# Patient Record
Sex: FEMALE | Race: WHITE | NOT HISPANIC OR LATINO | Employment: OTHER | URBAN - METROPOLITAN AREA
[De-identification: names, ages, dates, MRNs, and addresses within clinical notes are randomized per-mention and may not be internally consistent; named-entity substitution may affect disease eponyms.]

---

## 2018-06-20 LAB
ALBUMIN SERPL BCP-MCNC: 3.8 G/DL (ref 3.5–5.7)
ALP SERPL-CCNC: 70 IU/L (ref 55–165)
ALT SERPL W P-5'-P-CCNC: 8 IU/L (ref 5–26)
ANION GAP SERPL CALCULATED.3IONS-SCNC: 12.9 MM/L
AST SERPL W P-5'-P-CCNC: 14 U/L (ref 7–26)
BACTERIA UR QL AUTO: ABNORMAL
BASOPHILS # BLD AUTO: 0.1 X3/UL (ref 0–0.3)
BASOPHILS # BLD AUTO: 1 % (ref 0–2)
BILIRUB SERPL-MCNC: 0.8 MG/DL (ref 0.3–1)
BILIRUB UR QL STRIP: NEGATIVE
BUN SERPL-MCNC: 15 MG/DL (ref 7–25)
CALCIUM SERPL-MCNC: 9.6 MG/DL (ref 8.6–10.5)
CHLORIDE SERPL-SCNC: 97 MM/L (ref 98–107)
CLARITY UR: CLEAR
CO2 SERPL-SCNC: 30 MM/L (ref 21–31)
COLOR UR: YELLOW
CREAT SERPL-MCNC: 0.81 MG/DL (ref 0.6–1.2)
DEPRECATED RDW RBC AUTO: 14.4 % (ref 11.5–14.5)
EGFR (HISTORICAL): > 60 GFR
EGFR AFRICAN AMERICAN (HISTORICAL): > 60 GFR
EOSINOPHIL # BLD AUTO: 0.1 X3/UL (ref 0–0.5)
EOSINOPHIL NFR BLD AUTO: 1.2 % (ref 0–5)
GLUCOSE (HISTORICAL): 85 MG/DL (ref 65–99)
GLUCOSE UR STRIP-MCNC: NEGATIVE MG/DL
HCT VFR BLD AUTO: 33.4 % (ref 37–47)
HGB BLD-MCNC: 11.5 G/DL (ref 12–16)
HGB UR QL STRIP.AUTO: NEGATIVE
KETONES UR STRIP-MCNC: NEGATIVE MG/DL
LEUKOCYTE ESTERASE UR QL STRIP: ABNORMAL
LYMPHOCYTES # BLD AUTO: 1.5 X3/UL (ref 1.2–4.2)
LYMPHOCYTES NFR BLD AUTO: 28 % (ref 20.5–51.1)
MCH RBC QN AUTO: 31.3 PG (ref 26–34)
MCHC RBC AUTO-ENTMCNC: 34.4 G/DL (ref 31–36)
MCV RBC AUTO: 91 FL (ref 81–99)
MONOCYTES # BLD AUTO: 0.5 X3/UL (ref 0–1)
MONOCYTES NFR BLD AUTO: 9.9 % (ref 1.7–12)
MUCUS THREADS (HISTORICAL): ABNORMAL /HPF
NEUTROPHILS # BLD AUTO: 3.2 X3/UL (ref 1.4–6.5)
NEUTS SEG NFR BLD AUTO: 59.9 % (ref 42.2–75.2)
NITRITE UR QL STRIP: NEGATIVE
NON-SQ EPI CELLS URNS QL MICRO: ABNORMAL /HPF
OSMOLALITY, SERUM (HISTORICAL): 272 MOSM (ref 262–291)
PH UR STRIP.AUTO: 7 [PH] (ref 4.5–8)
PLATELET # BLD AUTO: 326 X3/UL (ref 130–400)
PMV BLD AUTO: 7.7 FL (ref 8.6–11.7)
POTASSIUM SERPL-SCNC: 3.9 MM/L (ref 3.5–5.5)
PROT UR STRIP-MCNC: NEGATIVE MG/DL
RBC # BLD AUTO: 3.67 X6/UL (ref 3.9–5.2)
RBC #/AREA URNS AUTO: ABNORMAL /HPF
SODIUM SERPL-SCNC: 136 MM/L (ref 134–143)
SP GR UR STRIP.AUTO: 1.01 (ref 1–1.03)
T4 TOTAL (HISTORICAL): 8.94 UG/DL (ref 6.1–12.2)
TOTAL PROTEIN (HISTORICAL): 6.5 G/DL (ref 6.4–8.9)
TSH SERPL DL<=0.05 MIU/L-ACNC: 2.88 UIU/M (ref 0.45–5.33)
UROBILINOGEN UR QL STRIP.AUTO: 0.2 EU/DL (ref 0.2–8)
WBC # BLD AUTO: 5.4 X3/UL (ref 4.8–10.8)
WBC #/AREA URNS AUTO: ABNORMAL /HPF

## 2018-06-30 ENCOUNTER — HOSPITAL ENCOUNTER (INPATIENT)
Facility: HOSPITAL | Age: 83
LOS: 2 days | Discharge: HOME HEALTH | DRG: 069 | End: 2018-07-03
Attending: EMERGENCY MEDICINE | Admitting: GENERAL PRACTICE
Payer: MEDICARE

## 2018-06-30 ENCOUNTER — APPOINTMENT (EMERGENCY)
Dept: CT IMAGING | Facility: HOSPITAL | Age: 83
DRG: 069 | End: 2018-06-30
Payer: MEDICARE

## 2018-06-30 ENCOUNTER — APPOINTMENT (OUTPATIENT)
Dept: MRI IMAGING | Facility: HOSPITAL | Age: 83
DRG: 069 | End: 2018-06-30
Payer: MEDICARE

## 2018-06-30 DIAGNOSIS — G45.9 TIA (TRANSIENT ISCHEMIC ATTACK): Primary | ICD-10-CM

## 2018-06-30 DIAGNOSIS — R41.0 TRANSIENT CONFUSION: ICD-10-CM

## 2018-06-30 DIAGNOSIS — R47.81 SLURRED SPEECH: ICD-10-CM

## 2018-06-30 PROBLEM — F03.90 DEMENTIA WITHOUT BEHAVIORAL DISTURBANCE (HCC): Status: ACTIVE | Noted: 2018-06-30

## 2018-06-30 PROBLEM — K11.8 MASS OF PAROTID GLAND: Status: ACTIVE | Noted: 2018-06-30

## 2018-06-30 LAB
ANION GAP SERPL CALCULATED.3IONS-SCNC: 6 MMOL/L (ref 4–13)
APTT PPP: 34 SECONDS (ref 24–36)
BACTERIA UR QL AUTO: ABNORMAL /HPF
BILIRUB UR QL STRIP: NEGATIVE
BUN SERPL-MCNC: 14 MG/DL (ref 5–25)
CALCIUM SERPL-MCNC: 9.5 MG/DL (ref 8.3–10.1)
CHLORIDE SERPL-SCNC: 96 MMOL/L (ref 100–108)
CLARITY UR: CLEAR
CO2 SERPL-SCNC: 29 MMOL/L (ref 21–32)
COLOR UR: YELLOW
CREAT SERPL-MCNC: 0.81 MG/DL (ref 0.6–1.3)
ERYTHROCYTE [DISTWIDTH] IN BLOOD BY AUTOMATED COUNT: 14.5 % (ref 11.6–15.1)
GFR SERPL CREATININE-BSD FRML MDRD: 62 ML/MIN/1.73SQ M
GLUCOSE SERPL-MCNC: 124 MG/DL (ref 65–140)
GLUCOSE UR STRIP-MCNC: NEGATIVE MG/DL
HCT VFR BLD AUTO: 34.1 % (ref 34.8–46.1)
HGB BLD-MCNC: 11.7 G/DL (ref 11.5–15.4)
HGB UR QL STRIP.AUTO: NEGATIVE
INR PPP: 1.05 (ref 0.86–1.17)
KETONES UR STRIP-MCNC: NEGATIVE MG/DL
LEUKOCYTE ESTERASE UR QL STRIP: ABNORMAL
MAGNESIUM SERPL-MCNC: 2.2 MG/DL (ref 1.6–2.6)
MCH RBC QN AUTO: 31 PG (ref 26.8–34.3)
MCHC RBC AUTO-ENTMCNC: 34.3 G/DL (ref 31.4–37.4)
MCV RBC AUTO: 91 FL (ref 82–98)
NITRITE UR QL STRIP: NEGATIVE
NON-SQ EPI CELLS URNS QL MICRO: ABNORMAL /HPF
PH UR STRIP.AUTO: 7 [PH] (ref 4.5–8)
PLATELET # BLD AUTO: 304 THOUSANDS/UL (ref 149–390)
PMV BLD AUTO: 9.6 FL (ref 8.9–12.7)
POTASSIUM SERPL-SCNC: 3.7 MMOL/L (ref 3.5–5.3)
PROT UR STRIP-MCNC: NEGATIVE MG/DL
PROTHROMBIN TIME: 13.6 SECONDS (ref 11.8–14.2)
RBC # BLD AUTO: 3.77 MILLION/UL (ref 3.81–5.12)
RBC #/AREA URNS AUTO: ABNORMAL /HPF
SODIUM SERPL-SCNC: 131 MMOL/L (ref 136–145)
SP GR UR STRIP.AUTO: 1.01 (ref 1–1.03)
TROPONIN I SERPL-MCNC: 0.02 NG/ML
TSH SERPL DL<=0.05 MIU/L-ACNC: 3.45 UIU/ML (ref 0.36–3.74)
UROBILINOGEN UR QL STRIP.AUTO: 0.2 E.U./DL
WBC # BLD AUTO: 6.37 THOUSAND/UL (ref 4.31–10.16)
WBC #/AREA URNS AUTO: ABNORMAL /HPF

## 2018-06-30 PROCEDURE — 85610 PROTHROMBIN TIME: CPT | Performed by: EMERGENCY MEDICINE

## 2018-06-30 PROCEDURE — 83735 ASSAY OF MAGNESIUM: CPT | Performed by: EMERGENCY MEDICINE

## 2018-06-30 PROCEDURE — 85730 THROMBOPLASTIN TIME PARTIAL: CPT | Performed by: EMERGENCY MEDICINE

## 2018-06-30 PROCEDURE — 84443 ASSAY THYROID STIM HORMONE: CPT | Performed by: PHYSICIAN ASSISTANT

## 2018-06-30 PROCEDURE — 82607 VITAMIN B-12: CPT | Performed by: PHYSICIAN ASSISTANT

## 2018-06-30 PROCEDURE — 85027 COMPLETE CBC AUTOMATED: CPT | Performed by: EMERGENCY MEDICINE

## 2018-06-30 PROCEDURE — 70496 CT ANGIOGRAPHY HEAD: CPT

## 2018-06-30 PROCEDURE — 36415 COLL VENOUS BLD VENIPUNCTURE: CPT | Performed by: EMERGENCY MEDICINE

## 2018-06-30 PROCEDURE — 82746 ASSAY OF FOLIC ACID SERUM: CPT | Performed by: PHYSICIAN ASSISTANT

## 2018-06-30 PROCEDURE — 99285 EMERGENCY DEPT VISIT HI MDM: CPT

## 2018-06-30 PROCEDURE — 70551 MRI BRAIN STEM W/O DYE: CPT

## 2018-06-30 PROCEDURE — 99220 PR INITIAL OBSERVATION CARE/DAY 70 MINUTES: CPT | Performed by: GENERAL PRACTICE

## 2018-06-30 PROCEDURE — 80048 BASIC METABOLIC PNL TOTAL CA: CPT | Performed by: EMERGENCY MEDICINE

## 2018-06-30 PROCEDURE — 84484 ASSAY OF TROPONIN QUANT: CPT | Performed by: EMERGENCY MEDICINE

## 2018-06-30 PROCEDURE — 81001 URINALYSIS AUTO W/SCOPE: CPT | Performed by: EMERGENCY MEDICINE

## 2018-06-30 PROCEDURE — 70498 CT ANGIOGRAPHY NECK: CPT

## 2018-06-30 PROCEDURE — 93005 ELECTROCARDIOGRAM TRACING: CPT

## 2018-06-30 PROCEDURE — 99223 1ST HOSP IP/OBS HIGH 75: CPT | Performed by: PSYCHIATRY & NEUROLOGY

## 2018-06-30 RX ORDER — LORATADINE 10 MG/1
10 TABLET ORAL DAILY
Status: CANCELLED | OUTPATIENT
Start: 2018-07-01

## 2018-06-30 RX ORDER — FUROSEMIDE 40 MG/1
40 TABLET ORAL DAILY
Status: DISCONTINUED | OUTPATIENT
Start: 2018-07-01 | End: 2018-07-03 | Stop reason: HOSPADM

## 2018-06-30 RX ORDER — HEPARIN SODIUM 5000 [USP'U]/ML
5000 INJECTION, SOLUTION INTRAVENOUS; SUBCUTANEOUS EVERY 8 HOURS SCHEDULED
Status: DISCONTINUED | OUTPATIENT
Start: 2018-06-30 | End: 2018-07-03 | Stop reason: HOSPADM

## 2018-06-30 RX ORDER — CLOPIDOGREL BISULFATE 75 MG/1
75 TABLET ORAL DAILY
Status: CANCELLED | OUTPATIENT
Start: 2018-06-30

## 2018-06-30 RX ORDER — LORATADINE 10 MG/1
10 TABLET ORAL DAILY
Status: DISCONTINUED | OUTPATIENT
Start: 2018-07-01 | End: 2018-07-03 | Stop reason: HOSPADM

## 2018-06-30 RX ORDER — MULTIVIT WITH MINERALS/LUTEIN
1000 TABLET ORAL DAILY
COMMUNITY

## 2018-06-30 RX ORDER — FUROSEMIDE 40 MG/1
40 TABLET ORAL DAILY
Status: CANCELLED | OUTPATIENT
Start: 2018-07-01

## 2018-06-30 RX ORDER — CHLORAL HYDRATE 500 MG
1000 CAPSULE ORAL DAILY
Status: CANCELLED | OUTPATIENT
Start: 2018-06-30

## 2018-06-30 RX ORDER — PANTOPRAZOLE SODIUM 40 MG/1
40 TABLET, DELAYED RELEASE ORAL
Status: CANCELLED | OUTPATIENT
Start: 2018-07-01

## 2018-06-30 RX ORDER — ASPIRIN 81 MG/1
81 TABLET, CHEWABLE ORAL DAILY
Status: CANCELLED | OUTPATIENT
Start: 2018-06-30

## 2018-06-30 RX ORDER — PHENOL 1.4 %
600 AEROSOL, SPRAY (ML) MUCOUS MEMBRANE DAILY
COMMUNITY

## 2018-06-30 RX ORDER — CALCIUM CARBONATE 200(500)MG
1000 TABLET,CHEWABLE ORAL DAILY PRN
Status: DISCONTINUED | OUTPATIENT
Start: 2018-06-30 | End: 2018-07-03 | Stop reason: HOSPADM

## 2018-06-30 RX ORDER — AMOXICILLIN 500 MG
CAPSULE ORAL DAILY
COMMUNITY

## 2018-06-30 RX ORDER — CLOPIDOGREL BISULFATE 75 MG/1
75 TABLET ORAL
Status: DISCONTINUED | OUTPATIENT
Start: 2018-06-30 | End: 2018-07-03 | Stop reason: HOSPADM

## 2018-06-30 RX ORDER — PANTOPRAZOLE SODIUM 40 MG/1
40 TABLET, DELAYED RELEASE ORAL DAILY
COMMUNITY

## 2018-06-30 RX ORDER — ONDANSETRON 2 MG/ML
4 INJECTION INTRAMUSCULAR; INTRAVENOUS EVERY 6 HOURS PRN
Status: DISCONTINUED | OUTPATIENT
Start: 2018-06-30 | End: 2018-07-03 | Stop reason: HOSPADM

## 2018-06-30 RX ORDER — CHLORAL HYDRATE 500 MG
1000 CAPSULE ORAL DAILY
Status: DISCONTINUED | OUTPATIENT
Start: 2018-07-01 | End: 2018-07-03 | Stop reason: HOSPADM

## 2018-06-30 RX ORDER — FAMOTIDINE 20 MG
TABLET ORAL DAILY
COMMUNITY

## 2018-06-30 RX ORDER — ATORVASTATIN CALCIUM 40 MG/1
40 TABLET, FILM COATED ORAL EVERY EVENING
Status: DISCONTINUED | OUTPATIENT
Start: 2018-06-30 | End: 2018-07-03 | Stop reason: HOSPADM

## 2018-06-30 RX ORDER — FUROSEMIDE 40 MG/1
40 TABLET ORAL DAILY
COMMUNITY

## 2018-06-30 RX ORDER — POLYETHYLENE GLYCOL 3350 17 G/17G
17 POWDER, FOR SOLUTION ORAL DAILY PRN
Status: DISCONTINUED | OUTPATIENT
Start: 2018-06-30 | End: 2018-07-03 | Stop reason: HOSPADM

## 2018-06-30 RX ORDER — PANTOPRAZOLE SODIUM 40 MG/1
40 TABLET, DELAYED RELEASE ORAL
Status: DISCONTINUED | OUTPATIENT
Start: 2018-07-01 | End: 2018-07-03 | Stop reason: HOSPADM

## 2018-06-30 RX ORDER — OMEGA-3-ACID ETHYL ESTERS 1 G/1
1 CAPSULE, LIQUID FILLED ORAL DAILY
Status: ON HOLD | COMMUNITY
End: 2018-09-18

## 2018-06-30 RX ORDER — LORATADINE 10 MG/1
10 TABLET ORAL DAILY
Status: ON HOLD | COMMUNITY
End: 2018-09-18

## 2018-06-30 RX ORDER — CLOPIDOGREL BISULFATE 75 MG/1
75 TABLET ORAL
COMMUNITY
End: 2018-11-17 | Stop reason: HOSPADM

## 2018-06-30 RX ORDER — ACETAMINOPHEN 325 MG/1
650 TABLET ORAL EVERY 6 HOURS PRN
Status: DISCONTINUED | OUTPATIENT
Start: 2018-06-30 | End: 2018-07-03 | Stop reason: HOSPADM

## 2018-06-30 RX ORDER — PNV NO.95/FERROUS FUM/FOLIC AC 28MG-0.8MG
TABLET ORAL DAILY
COMMUNITY

## 2018-06-30 RX ADMIN — CLOPIDOGREL BISULFATE 75 MG: 75 TABLET ORAL at 21:45

## 2018-06-30 RX ADMIN — IOHEXOL 100 ML: 350 INJECTION, SOLUTION INTRAVENOUS at 11:37

## 2018-06-30 RX ADMIN — ATORVASTATIN CALCIUM 40 MG: 40 TABLET, FILM COATED ORAL at 17:06

## 2018-06-30 RX ADMIN — HEPARIN SODIUM 5000 UNITS: 5000 INJECTION, SOLUTION INTRAVENOUS; SUBCUTANEOUS at 14:30

## 2018-06-30 RX ADMIN — HEPARIN SODIUM 5000 UNITS: 5000 INJECTION, SOLUTION INTRAVENOUS; SUBCUTANEOUS at 21:45

## 2018-06-30 NOTE — PLAN OF CARE
Potential for Aspiration     Ventilated patient's risk of aspiration is minimized Completed          Activity Intolerance/Impaired Mobility     Mobility/activity is maintained at optimum level for patient Progressing        Communication Impairment     Ability to express needs and understand communication [place]     Discharge to home or other facility with appropriate resources Progressing        DISCHARGE PLANNING - CARE MANAGEMENT     Discharge to post-acute care or home with appropriate resources Progressing        INFECTION - ADULT     Absence or prevention of progression during hospitalization Progressing        Knowledge Deficit     Patient/family/caregiver demonstrates understanding of disease process, treatment plan, medications, and discharge instructions Progressing        Neurological Deficit     Neurological status is stable or improving Progressing        Nutrition     Nutrition/Hydration status is improving Progressing        PAIN - ADULT     Verbalizes/displays adequate comfort level or baseline comfort level Progressing        Potential for Aspiration     Non-ventilated patient's risk of aspiration is minimized Progressing        SAFETY ADULT     Patient will remain free of falls Progressing     Maintain or return to baseline ADL function Progressing     Maintain or return mobility status to optimal level Progressing

## 2018-06-30 NOTE — ASSESSMENT & PLAN NOTE
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

## 2018-06-30 NOTE — ASSESSMENT & PLAN NOTE
· Suspected, will check TSH, B12, folate  · Dementia precautions, reoriented as needed, fall precautions

## 2018-06-30 NOTE — CONSULTS
Consultation - Neurology   Kimmy Christy 80 y o  female MRN: [id number]  Unit/Bed#: -01 Encounter: [id number]      Physician Requesting Consult: Kenna Zamorano MD  Reason for Consult:  Speech dysfunction, acute confusion  Hx and PE limited by:  None    HPI: Kimmy Christy is a right handed  80 y o  female who is extremely hard of hearing, and was in her usual state of health when she came out this morning into her kitchen told her son she did feel right and subsequently while having breakfast had difficulty doing so was noted to be gibberish had slurred speech, and appeared confused  She apparently appeared extremely pale, her son took her blood pressure which was 130 /70 and decided to bring her into the emergency room     By the time she got to the emergency room her speech had improved, patient's confusion also cleared and was not witnessed to have loss of consciousness or any seizure-like activity  Patient does not feel right yet and denies any headache, blurry vision, and at baseline has difficulty swallowing and eats only pureed food  She also has gait dysfunction at baseline  due to bilateral knee DJD and ambulates with the help of a walker  Patient also complains of left upper and lower extremity tingling and numbness which has been longstanding  No history of CVA or seizure disorder no history of any similar events in the past   Patient had a CT scan of the brain in the emergency room which was unremarkable  Patient also has a mass along the right parotid which was evaluated 1 year ago and was felt to be benign and no surgical intervention was offered  There has been no increase in the size of the mass as per the family members for the last 1 year  She has a history of baseline dementia with short-term memory difficulty and tends to continue talking about the past and also has disturbance of circadian rhythm generally sleeps during the day and is awake at night    At baseline patient has no history of hypertension and diabetes but apparently has a nitro patch, and has been on Plavix  She sees her primary doctor on a regular basis  Review of Systems:  See history of present illness for pertinent neurological symptoms  All other systems are negative  Historical Information   Past Medical History:   Diagnosis Date    Cardiac disease     GERD (gastroesophageal reflux disease)     Hypertension      History reviewed  No pertinent surgical history  Social History   History   Smoking Status    Never Smoker   Smokeless Tobacco    Never Used     History   Alcohol Use No     History   Drug Use No       Family History:   History reviewed  No pertinent family history  No Known Allergies    Meds:  All current active meds have been reviewed    Scheduled Meds:  PRN Meds:     Physical Exam:   Objective   Vitals:   Vitals:    06/30/18 1330   BP: 160/71   Pulse: 70   Resp: 18   Temp: 98 2 °F (36 8 °C)   SpO2: 97%   ,Body mass index is 26 26 kg/m²  Patient was examined in bed  General appearance: Cooperative in no acute distress  Head & neck head is atraumatic and normocephalic  Neck is supple with full range of motion  Cardiovascular: Carotid arteriesno carotid bruits  Neurologic:   On neurological examination patient is extremely hard of hearing, she is alert awake and oriented at this time knows she is at [facility], speech is fluent, she can obey simple commands, and had no difficulty with repetition  Cranial nerve exam visual fields are full to threat pupils equal and reactive extraocular movements are intact fundi could not be visualize, sensation in the V1 V2 V3 distribution is symmetric no obvious facial asymmetry, she has a right mass parotid mass which is about 3 inches in diameter nontender, tongue is midline gag is adequate  Hearing is impaired and she would shrug both shoulders    On motor examination there is no evidence of any drift, she would lift both upper and lower extremities antigravity with poor effort but had adequate strength bilaterally, deep tendon reflexes are 1+ bilaterally and trace in the knees toes are downgoing  Sensory examination she would grimace to pinprick more prominently on the right side as compared to the left, proprioception and vibration could not be evaluated  Coordination there is no finger-to-nose dysmetria bilaterally, and gait could not be evaluated  Lab Results:Lab Results:   I have personally reviewed pertinent reports  , CBC:   Results from last 7 days  Lab Units 06/30/18  1123   WBC Thousand/uL 6 37   RBC Million/uL 3 77*   HEMOGLOBIN g/dL 11 7   HEMATOCRIT % 34 1*   MCV fL 91   PLATELETS Thousands/uL 304   , BMP/CMP:   Results from last 7 days  Lab Units 06/30/18  1123   SODIUM mmol/L 131*   POTASSIUM mmol/L 3 7   CHLORIDE mmol/L 96*   CO2 mmol/L 29   ANION GAP mmol/L 6   BUN mg/dL 14   CREATININE mg/dL 0 81   GLUCOSE RANDOM mg/dL 124   CALCIUM mg/dL 9 5   EGFR ml/min/1 73sq m 62     I have personally reviewed pertinent reports  EEG, Echo, Pathology, and Other Studies: I have personally reviewed pertinent films in PACS    Family, was present at the bedside for history and examination  Assessment:  1  Anterior circulation TIA versus seizure episode  2  History of baseline dementia  3  History of left charo sensory dysfunction  Plan: Will request an MRI of the brain, stroke pathway, patient has been on Plavix 75 mg daily her medication list was reviewed with son, will also get an EEG done, CTA showed no evidence of any significant carotid stenosis  Will get rehab involved  Will follow up this patient with you        Arti Uribe MD  6/30/2018,1:50 PM    "This note has been constructed using a voice recognition system "

## 2018-06-30 NOTE — ASSESSMENT & PLAN NOTE
· Probable TIA, rule out CVA  Admit stroke pathway  · Neurology consult appreciated, discussed with Dr Krystian He: EEG, MRI, carotid, TSH, folate, B12, echo pending  · PT/OT and speech consult  · PMR consult  · Already on Plavix, will continue    Consider adding aspirin if MRI is (+) given increased bleeding risk with age/fall risk  · Fall precautions, patient has degenerative joint disease aunt ambulates with difficulty at baseline  · Monitor blood pressure, frequent neuro checks per stroke pathway

## 2018-06-30 NOTE — PLAN OF CARE
Activity Intolerance/Impaired Mobility     Mobility/activity is maintained at optimum level for patient Progressing        Communication Impairment     Ability to express needs and understand communication [place]     Discharge to home or other facility with appropriate resources Progressing        DISCHARGE PLANNING - CARE MANAGEMENT     Discharge to post-acute care or home with appropriate resources Progressing        INFECTION - ADULT     Absence or prevention of progression during hospitalization Progressing        Knowledge Deficit     Patient/family/caregiver demonstrates understanding of disease process, treatment plan, medications, and discharge instructions Progressing        Neurological Deficit     Neurological status is stable or improving Progressing        Nutrition     Nutrition/Hydration status is improving Progressing        PAIN - ADULT     Verbalizes/displays adequate comfort level or baseline comfort level Progressing        Potential for Aspiration     Non-ventilated patient's risk of aspiration is minimized Progressing     Ventilated patient's risk of aspiration is minimized Progressing        SAFETY ADULT     Patient will remain free of falls Progressing     Maintain or return to baseline ADL function Progressing     Maintain or return mobility status to optimal level Progressing

## 2018-06-30 NOTE — Clinical Note
Case was discussed with Terri Castro and the patient's admission status was agreed to be Admission Status: observation status to the service of Dr Michelle Mc

## 2018-06-30 NOTE — H&P
H&P- Dinesh Mathew 7/21/1923, 80 y o  female MRN: [id number]    Unit/Bed#: -01 Encounter: [id number]    Primary Care Provider: Varsha Street DO   Date and time admitted to hospital: 6/30/2018 10:59 AM        * TIA (transient ischemic attack)   Assessment & Plan    · Probable TIA, rule out CVA  Admit stroke pathway  CT/CTA negative for acute intracranial abnormalities  · Neurology consult appreciated, discussed with Dr Juan Bryant: EEG, MRI, carotid, TSH, folate, B12, echo pending  · PT/OT and speech consult  · PMR consult  · Already on Plavix, will continue  Consider adding aspirin if MRI is (+) given increased bleeding risk with age/fall risk  · Fall precautions, patient has degenerative joint disease aunt ambulates with difficulty at baseline  · Monitor blood pressure, frequent neuro checks per stroke pathway        Mass of parotid gland   Assessment & Plan    · Per son, present for at least 5-6 years, grown a little in the past year so  · 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  They were recommended against surgery given the proximity to multiple nerves in the possibility for complete hearing loss        Dementia without behavioral disturbance   Assessment & Plan    · Suspected, will check TSH, B12, folate  · Dementia precautions, reoriented as needed, fall precautions            VTE Prophylaxis: Heparin  / sequential compression device   Code Status:  Full code  POLST: POLST form is not discussed and not completed at this time  Discussion with family:  Son and daughter at the bedside    Anticipated Length of Stay:  Patient will be admitted on an Observation basis with an anticipated length of stay of  < 2 midnights  Justification for Hospital Stay:  TIA versus CVA workup    Total Time for Visit, including Counseling / Coordination of Care: 45 minutes  Greater than 50% of this total time spent on direct patient counseling and coordination of care      Chief Complaint:   Acute confusion, slurred speech    History of Present Illness:    Maria Guadalupe Lockwood is a 80 y o  female who presents with acute confusion and slurred speech  This occurred around 8:30 this morning, the patient came out to the kitchen and sat down do not feeling well  She then had slurred speech, appeared confused, and was not acting herself per the son  This lasted about 30 min and then resolved  He brought in to the emergency room for further workup  Otherwise, patient has been in her normal state of health  No history of TIA or CVA  She has a large parotid mass on the right cheek that has been present for several years, they opted against surgery due to proximity multiple nerves in the possibility of complete hearing loss  Patient already has 100% hearing loss in the left ear  Review of Systems:    Review of Systems   Unable to perform ROS: Other (Very hard of hearing)   Neurological: Positive for speech difficulty  Negative for tremors, seizures, syncope and facial asymmetry  Psychiatric/Behavioral: Positive for confusion  Past Medical and Surgical History:     Past Medical History:   Diagnosis Date    Cardiac disease     GERD (gastroesophageal reflux disease)     Hypertension        History reviewed  No pertinent surgical history  Meds/Allergies:    Prior to Admission medications    Medication Sig Start Date End Date Taking?  Authorizing Provider   calcium carbonate (OS-MAURISIO) 600 MG tablet Take 600 mg by mouth daily   Yes Historical Provider, MD   clopidogrel (PLAVIX) 75 mg tablet Take 75 mg by mouth daily at bedtime     Yes Historical Provider, MD   Ferrous Sulfate (IRON) 325 (65 Fe) MG TABS Take by mouth   Yes Historical Provider, MD   furosemide (LASIX) 40 mg tablet Take 40 mg by mouth daily   Yes Historical Provider, MD   loratadine (CLARITIN) 10 mg tablet Take 10 mg by mouth daily   Yes Historical Provider, MD   Omega-3 Fatty Acids (FISH OIL) 1200 MG CAPS Take by mouth Yes Historical Provider, MD   omega-3-acid ethyl esters (LOVAZA) 1 g capsule Take 1 g by mouth daily   Yes Historical Provider, MD   pantoprazole (PROTONIX) 40 mg tablet Take 40 mg by mouth daily   Yes Historical Provider, MD   Vitamin D, Cholecalciferol, 1000 units CAPS Take by mouth   Yes Historical Provider, MD   vitamin E, tocopherol, (CVS VITAMIN E) 1,000 units capsule Take 1,000 Units by mouth daily   Yes Historical Provider, MD     I have reviewed home medications with patient family member  Allergies: No Known Allergies    Social History:     Marital Status:    Occupation: n/a  Patient Pre-hospital Living Situation:  Home, lives independently however her son stays with her overnight  Patient Pre-hospital Level of Mobility:  Limited, severe DJD; ambulates with assistive device at all times  Patient Pre-hospital Diet Restrictions:  Regular, dental soft  Substance Use History:   History   Alcohol Use No     History   Smoking Status    Never Smoker   Smokeless Tobacco    Never Used     History   Drug Use No       Family History:    History reviewed  No pertinent family history  Physical Exam:     Vitals:   Blood Pressure: 160/71 (06/30/18 1330)  Pulse: 70 (06/30/18 1330)  Temperature: 98 2 °F (36 8 °C) (06/30/18 1330)  Temp Source: Oral (06/30/18 1330)  Respirations: 18 (06/30/18 1330)  Height: 5' (152 4 cm) (06/30/18 1330)  Weight - Scale: 61 kg (134 lb 7 7 oz) (06/30/18 1330)  SpO2: 97 % (06/30/18 1330)    Physical Exam   Constitutional: She is oriented to person, place, and time  She appears well-developed and well-nourished  No distress  HENT:   Head:       Mouth/Throat: Oropharynx is clear and moist and mucous membranes are normal    Very hard of hearing   Eyes: Conjunctivae and EOM are normal  Pupils are equal, round, and reactive to light  Neck: Neck supple  Carotid bruit is not present  No thyromegaly present     Cardiovascular: Normal rate, regular rhythm, S1 normal, S2 normal, normal heart sounds and intact distal pulses  No murmur heard  Pulmonary/Chest: Effort normal and breath sounds normal  No respiratory distress  She has no wheezes  She has no rhonchi  She has no rales  Abdominal: Soft  Normal appearance and bowel sounds are normal  She exhibits no distension  There is no tenderness  Musculoskeletal: She exhibits no edema  Lymphadenopathy:     She has no cervical adenopathy  Neurological: She is alert and oriented to person, place, and time  A sensory deficit (diminished at baseline left arm/leg) is present  No cranial nerve deficit  Coordination normal  Abnormal gait: not assessed  Skin: Skin is warm, dry and intact  Nursing note and vitals reviewed  Additional Data:     Lab Results: I have personally reviewed pertinent reports  Results from last 7 days  Lab Units 06/30/18  1123   WBC Thousand/uL 6 37   HEMOGLOBIN g/dL 11 7   HEMATOCRIT % 34 1*   PLATELETS Thousands/uL 304       Results from last 7 days  Lab Units 06/30/18  1123   SODIUM mmol/L 131*   POTASSIUM mmol/L 3 7   CHLORIDE mmol/L 96*   CO2 mmol/L 29   BUN mg/dL 14   CREATININE mg/dL 0 81   CALCIUM mg/dL 9 5   GLUCOSE RANDOM mg/dL 124       Results from last 7 days  Lab Units 06/30/18  1123   INR  1 05               Imaging: I have personally reviewed pertinent reports        CTA head and neck with and without contrast   Final Result by Vero Galeana MD (06/30 1206)   Large inhomogeneous right parotid mass, 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      Chronic appearing microangiopathic ischemic changes involving supratentorial white matter      No acute intracranial hemorrhage or mass effect      No evidence of critical stenosis, dissection or occlusion involving cervical carotid or vertebral segments or visualized cerebral arteries      Findings personally discussed with Dr Reta Carlos by phone at 12:05 PM, 6/30/2018                  Workstation performed: [id number]         MRI Inpatient Order    (Results Pending)       EKG, Pathology, and Other Studies Reviewed on Admission:   · EKG:  Not readily available at the time of evaluation    Allscripts / Epic Records Reviewed: Yes     ** Please Note: This note has been constructed using a voice recognition system   **

## 2018-06-30 NOTE — ED PROVIDER NOTES
History  Chief Complaint   Patient presents with    Slurred Speech     patient presents to the ED with c/o slurred speech this AM  Patient alert and oriented      HPI    Prior to Admission Medications   Prescriptions Last Dose Informant Patient Reported? Taking? Ferrous Sulfate (IRON) 325 (65 Fe) MG TABS 6/29/2018 at Unknown time  Yes Yes   Sig: Take by mouth   Omega-3 Fatty Acids (FISH OIL) 1200 MG CAPS 6/29/2018 at Unknown time  Yes Yes   Sig: Take by mouth   Vitamin D, Cholecalciferol, 1000 units CAPS 6/29/2018 at Unknown time  Yes Yes   Sig: Take by mouth   calcium carbonate (OS-MAURISIO) 600 MG tablet 6/30/2018 at Unknown time  Yes Yes   Sig: Take 600 mg by mouth daily   clopidogrel (PLAVIX) 75 mg tablet 6/29/2018 at Unknown time  Yes Yes   Sig: Take 75 mg by mouth daily at bedtime     furosemide (LASIX) 40 mg tablet 6/29/2018 at 0900  Yes Yes   Sig: Take 40 mg by mouth daily   loratadine (CLARITIN) 10 mg tablet 6/30/2018 at Unknown time  Yes Yes   Sig: Take 10 mg by mouth daily   omega-3-acid ethyl esters (LOVAZA) 1 g capsule 6/29/2018 at Unknown time  Yes Yes   Sig: Take 1 g by mouth daily   pantoprazole (PROTONIX) 40 mg tablet 6/29/2018 at Unknown time  Yes Yes   Sig: Take 40 mg by mouth daily   vitamin E, tocopherol, (CVS VITAMIN E) 1,000 units capsule 6/29/2018 at Unknown time  Yes Yes   Sig: Take 1,000 Units by mouth daily      Facility-Administered Medications: None       Past Medical History:   Diagnosis Date    Cardiac disease     GERD (gastroesophageal reflux disease)     Hypertension        History reviewed  No pertinent surgical history  History reviewed  No pertinent family history  I have reviewed and agree with the history as documented  Social History   Substance Use Topics    Smoking status: Never Smoker    Smokeless tobacco: Never Used    Alcohol use No        Review of Systems    Physical Exam  Physical Exam   Constitutional: She appears well-developed and well-nourished   No distress  HENT:   Head: Normocephalic and atraumatic  Eyes: Conjunctivae and EOM are normal  Pupils are equal, round, and reactive to light  Neck: Normal range of motion  No tracheal deviation present  Cardiovascular: Normal rate, regular rhythm, normal heart sounds and intact distal pulses  Pulmonary/Chest: Effort normal and breath sounds normal  No respiratory distress  Abdominal: Soft  She exhibits no distension  There is no tenderness  Neurological: She is alert  She has normal strength  No cranial nerve deficit or sensory deficit  Coordination normal  GCS eye subscore is 4  GCS verbal subscore is 5  GCS motor subscore is 6  Reflex Scores:       Bicep reflexes are 1+ on the right side and 1+ on the left side  Brachioradialis reflexes are 1+ on the right side and 1+ on the left side  Patellar reflexes are 1+ on the right side and 1+ on the left side  She is oriented to herself, her , and that she is at [facility]  When asked the year, she gives her birth year  When asked what year it is now, she states that she is about to be 89  When asked what month it is, she states next month is July, and it is my birth month  She has difficulties following some commands and answering questions appropriately  She she is very hard-of-hearing  The  states her current mental status and ability to follow commands is her baseline due to being hard of hearing, and not having fully functional hearing aids, so well extrapolate information based on the half of what she is hearing   Skin: Skin is warm and dry  Psychiatric: She has a normal mood and affect  Her behavior is normal    Nursing note and vitals reviewed        Vital Signs  ED Triage Vitals   Temperature Pulse Respirations Blood Pressure SpO2   06/30/18 1126 06/30/18 1104 06/30/18 1104 06/30/18 1104 06/30/18 1104   98 3 °F (36 8 °C) 89 18 163/77 95 %      Temp Source Heart Rate Source Patient Position - Orthostatic VS BP Location FiO2 (%)   06/30/18 1126 06/30/18 1330 06/30/18 1330 06/30/18 1330 --   Oral Monitor Lying Right arm       Pain Score       06/30/18 1330       No Pain           Vitals:    06/30/18 1430 06/30/18 1530 06/30/18 1630 06/30/18 1830   BP: 152/58 152/68 (!) 175/84 138/62   Pulse: 71 70 84 66   Patient Position - Orthostatic VS: Lying Lying Lying Lying       Visual Acuity  Visual Acuity      Most Recent Value   L Pupil Size (mm)  2   R Pupil Size (mm)  2   L Pupil Shape  Round   R Pupil Shape  Round          ED Medications  Medications   atorvastatin (LIPITOR) tablet 40 mg (40 mg Oral Given 6/30/18 1706)   heparin (porcine) subcutaneous injection 5,000 Units (5,000 Units Subcutaneous Given 6/30/18 1430)   acetaminophen (TYLENOL) tablet 650 mg (not administered)   polyethylene glycol (MIRALAX) packet 17 g (not administered)   ondansetron (ZOFRAN) injection 4 mg (not administered)   calcium carbonate (TUMS) chewable tablet 1,000 mg (not administered)   clopidogrel (PLAVIX) tablet 75 mg (not administered)   furosemide (LASIX) tablet 40 mg (not administered)   loratadine (CLARITIN) tablet 10 mg (not administered)   fish oil capsule 1,000 mg (not administered)   pantoprazole (PROTONIX) EC tablet 40 mg (not administered)   iohexol (OMNIPAQUE) 350 MG/ML injection (MULTI-DOSE) 100 mL (100 mL Intravenous Given 6/30/18 1137)       Diagnostic Studies  Results Reviewed     Procedure Component Value Units Date/Time    TSH, 3rd generation [[id number]]  (Normal) Collected:  06/30/18 1123    Lab Status:  Final result Specimen:  Blood from Arm, Right Updated:  06/30/18 1454     TSH 3RD GENERATON 3 448 uIU/mL     Narrative:         Patients undergoing fluorescein dye angiography may retain small amounts of fluorescein in the body for 48-72 hours post procedure  Samples containing fluorescein can produce falsely depressed TSH values   If the patient had this procedure,a specimen should be resubmitted post fluorescein clearance  The recommended reference ranges for TSH during pregnancy are as follows:  First trimester 0 1 to 2 5 uIU/mL  Second trimester  0 2 to 3 0 uIU/mL  Third trimester 0 3 to 3 0 uIU/m      Vitamin B12 [[id number]] Collected:  06/30/18 1123    Lab Status: In process Specimen:  Blood from Arm, Right Updated:  06/30/18 1429    Folate [[id number]] Collected:  06/30/18 1123    Lab Status:   In process Specimen:  Blood from Arm, Right Updated:  06/30/18 1429    Urine Microscopic [[id number]]  (Abnormal) Collected:  06/30/18 1235    Lab Status:  Final result Specimen:  Urine from Urine, Clean Catch Updated:  06/30/18 1344     RBC, UA None Seen /hpf      WBC, UA 0-1 (A) /hpf      Epithelial Cells Occasional /hpf      Bacteria, UA Occasional /hpf     UA w Reflex to Microscopic w Reflex to Culture [[id number]]  (Abnormal) Collected:  06/30/18 1235    Lab Status:  Final result Specimen:  Urine from Urine, Clean Catch Updated:  06/30/18 1247     Color, UA Yellow     Clarity, UA Clear     Specific Gravity, UA 1 010     pH, UA 7 0     Leukocytes, UA Trace (A)     Nitrite, UA Negative     Protein, UA Negative mg/dl      Glucose, UA Negative mg/dl      Ketones, UA Negative mg/dl      Urobilinogen, UA 0 2 E U /dl      Bilirubin, UA Negative     Blood, UA Negative    Troponin I [[id number]]  (Normal) Collected:  06/30/18 1123    Lab Status:  Final result Specimen:  Blood from Arm, Right Updated:  06/30/18 1150     Troponin I 0 02 ng/mL     Basic metabolic panel [[id number]]  (Abnormal) Collected:  06/30/18 1123    Lab Status:  Final result Specimen:  Blood from Arm, Right Updated:  06/30/18 1142     Sodium 131 (L) mmol/L      Potassium 3 7 mmol/L      Chloride 96 (L) mmol/L      CO2 29 mmol/L      Anion Gap 6 mmol/L      BUN 14 mg/dL      Creatinine 0 81 mg/dL      Glucose 124 mg/dL      Calcium 9 5 mg/dL      eGFR 62 ml/min/1 73sq m     Narrative:         National Kidney Disease Education Program recommendations are as follows:  GFR calculation is accurate only with a steady state creatinine  Chronic Kidney disease less than 60 ml/min/1 73 sq  meters  Kidney failure less than 15 ml/min/1 73 sq  meters  Magnesium [[id number]]  (Normal) Collected:  06/30/18 1123    Lab Status:  Final result Specimen:  Blood from Arm, Right Updated:  06/30/18 1142     Magnesium 2 2 mg/dL     APTT [[id number]]  (Normal) Collected:  06/30/18 1123    Lab Status:  Final result Specimen:  Blood from Arm, Right Updated:  06/30/18 1140     PTT 34 seconds     Protime-INR [[id number]]  (Normal) Collected:  06/30/18 1123    Lab Status:  Final result Specimen:  Blood from Arm, Right Updated:  06/30/18 1140     Protime 13 6 seconds      INR 1 05    CBC [[id number]]  (Abnormal) Collected:  06/30/18 1123    Lab Status:  Final result Specimen:  Blood from Arm, Right Updated:  06/30/18 1135     WBC 6 37 Thousand/uL      RBC 3 77 (L) Million/uL      Hemoglobin 11 7 g/dL      Hematocrit 34 1 (L) %      MCV 91 fL      MCH 31 0 pg      MCHC 34 3 g/dL      RDW 14 5 %      Platelets 761 Thousands/uL      MPV 9 6 fL                  MRI brain wo contrast   Final Result by Haritha Lawson MD (06/30 1704)         1  No acute infarction, intracranial hemorrhage or intracranial mass   2  Mild, chronic microangiopathy   3  Right parotid mass redemonstrated        Workstation performed: [id number]         CTA head and neck with and without contrast   Final Result by Valerie Madrid MD (06/30 1206)   Large inhomogeneous right parotid mass, 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      Chronic appearing microangiopathic ischemic changes involving supratentorial white matter      No acute intracranial hemorrhage or mass effect      No evidence of critical stenosis, dissection or occlusion involving cervical carotid or vertebral segments or visualized cerebral arteries      Findings personally discussed with Dr Da Brooks by phone at 12:05 PM, 6/30/2018                  Workstation performed: [id number]                    Procedures  ECG 12 Lead Documentation  Date/Time: 6/30/2018 11:22 AM  Performed by: Parisa Bernard  Authorized by: Parisa BLEDSOE     Indications / Diagnosis:  Slurred speech, confusion  ECG reviewed by me, the ED Provider: yes    Patient location:  ED  Previous ECG:     Previous ECG:  Unavailable  Interpretation:     Interpretation: non-specific    Quality:     Tracing quality:  Limited by artifact  Rate:     ECG rate:  86    ECG rate assessment: normal    Rhythm:     Rhythm: sinus rhythm    Ectopy:     Ectopy: none    QRS:     QRS axis:  Left    QRS intervals:  Normal  Conduction:     Conduction: normal    ST segments:     ST segments:  Normal  T waves:     T waves: flattening and inverted      Flattening:  I and aVL    Inverted:  V6           Phone Contacts  ED Phone Contact    ED Course                   Stroke Assessment     Row Name 06/30/18 1126             NIH Stroke Scale    Interval Baseline      Level of Consciousness (1a ) 0      LOC Questions (1b ) 1      LOC Commands (1c ) 0      Best Gaze (2 ) 0      Visual (3 ) 0      Facial Palsy (4 ) 0      Motor Arm, Left (5a ) 0      Motor Arm, Right (5b ) 0      Motor Leg, Left (6a ) 0      Motor Leg, Right (6b ) 0      Limb Ataxia (7 ) 0      Sensory (8 ) 0      Best Language (9 ) 0      Dysarthria (10 ) 1      Extinction and Inattention (11 ) (Formerly Neglect)        Total                  First Filed Value   TPA Decision  Patient not a TPA candidate  Patient is not a candidate options  Symptoms resolved/clearly non disabling  MDM  Number of Diagnoses or Management Options  Slurred speech: new and requires workup  TIA (transient ischemic attack): new and requires workup  Transient confusion: new and requires workup  Diagnosis management comments: This is a 70-year-old female who presents here today with confusion and difficulty speaking    The patient states she got up this morning, made breakfast, and when she went to go sit down to eat it did not know was going on around her  She denies any complaints at this point in time  According to the  she was fine when she first woke up this morning  At about 0830 they went to sit down for breakfast and her wheelchair got caught in the table leg  She could not figure out how to unstick herself from the table  She was incoherent when she was speaking, and not making sense  He states that she is doing much better now but some of her words are still slightly garbled for her  He states she does have difficulties hearing and following commands at baseline, because "she does not listen" and he feels like she might not "be all the way there "  He also notes that she has one hearing aid that is not fully functioning, and she wears it on a low setting, so has a hard time hearing and understanding what is said to her  The patient denies any pain, recent falls, palpitations, infectious symptoms, trouble breathing, or other complaints  ROS: Otherwise negative, unless stated as above  She is well-appearing, in no acute distress  History from the patient and exam are slightly limited given her difficulties hearing and following commands  She is able to state her name, identify her  and a friend in the room with them, as well as know that she is at [facility]  When asked what year it is, she states "1923" which is the year that she was born  When asked what year it is now, she states "about to be 89 "  When asked what month it is, she states "I was born in [birth year] "  There is no obvious facial droop on exam, however when instructions were given for specifically testing facial muscles, the patient had difficulties understanding and following commands    For example, when asked to puff out her cheeks, she states "I do not have any teeth "  When asked to raise her eyebrows, she states "I had surgery on my eyes "  When asked again to raise them to her forehead, she states "I fell awhile ago and there is a hole in my head "  She has no drift of either arm or leg, and has no focal weakness of either arm or leg as best as can be evaluated  She does feel being touched on both arms and legs, however when asked where she is being touched to assess for extinction, she just says "my arms" or "my legs" but is unable to specify beyond that, even when just one arm or leg is touched  The  states her current degree of difficulty following commands and answering questions is her baseline and is not different from normal  She occasionally has a few words that are garbled and difficult to understand, however this may be somewhat limited as she is currently edentulous  She has a large mass near the right parotid, which according the  is a "dry tumor" and has been present for a while  She is not a surgical candidate because of her age  The  and patient both deny any recent changes to this  The remainder of her exam is unremarkable  She is not a tPA candidate, as per the  her symptoms are improving spontaneously  Within the ability to actually test the patient, she does have a very low NIH stroke scale, though some of her points are due to difficulty hearing and following commands  However, there are no focal deficits noted on exam   We will get a CT/CTA and check lab work to evaluate per stroke protocol  She has a mild hyponatremia and hypochloremia  The remainder of her labs are unremarkable  Her CT scan shows no acute abnormalities  The radiologist is concerned that the parotid mass is malignant  However, I did ask the family again and they state that has been unchanged in size for several years which does make it less likely to be malignant  The  states states she is still improving, but is still not entirely back at baseline  We will admit her for further workup of her symptoms         Amount and/or Complexity of Data Reviewed  Clinical lab tests: ordered and reviewed  Tests in the radiology section of CPT®: reviewed and ordered  Obtain history from someone other than the patient: yes ()  Independent visualization of images, tracings, or specimens: yes      CritCare Time    Disposition  Final diagnoses:   TIA (transient ischemic attack)   Slurred speech   Transient confusion     Time reflects when diagnosis was documented in both MDM as applicable and the Disposition within this note     Time User Action Codes Description Comment    6/30/2018 12:41 PM Emerald Jones [G45 9] TIA (transient ischemic attack)     6/30/2018 12:42 PM Emerald Jones [R47 81] Slurred speech     6/30/2018 12:42 PM Emerald Jones [R41 0] Transient confusion       ED Disposition     ED Disposition Condition Comment    Admit  Case was discussed with Carlos Simmons and the patient's admission status was agreed to be Admission Status: observation status to the service of Dr Santi Holland  Follow-up Information    None         Current Discharge Medication List      CONTINUE these medications which have NOT CHANGED    Details   calcium carbonate (OS-MAURISIO) 600 MG tablet Take 600 mg by mouth daily      clopidogrel (PLAVIX) 75 mg tablet Take 75 mg by mouth daily at bedtime        Ferrous Sulfate (IRON) 325 (65 Fe) MG TABS Take by mouth      furosemide (LASIX) 40 mg tablet Take 40 mg by mouth daily      loratadine (CLARITIN) 10 mg tablet Take 10 mg by mouth daily      Omega-3 Fatty Acids (FISH OIL) 1200 MG CAPS Take by mouth      omega-3-acid ethyl esters (LOVAZA) 1 g capsule Take 1 g by mouth daily      pantoprazole (PROTONIX) 40 mg tablet Take 40 mg by mouth daily      Vitamin D, Cholecalciferol, 1000 units CAPS Take by mouth      vitamin E, tocopherol, (CVS VITAMIN E) 1,000 units capsule Take 1,000 Units by mouth daily           No discharge procedures on file      ED Provider  Electronically Signed by           Amaris See MD  06/30/18 1946

## 2018-07-01 ENCOUNTER — APPOINTMENT (OUTPATIENT)
Dept: NON INVASIVE DIAGNOSTICS | Facility: HOSPITAL | Age: 83
DRG: 069 | End: 2018-07-01
Payer: MEDICARE

## 2018-07-01 LAB
ANION GAP SERPL CALCULATED.3IONS-SCNC: 7 MMOL/L (ref 4–13)
ATRIAL RATE: 86 BPM
BUN SERPL-MCNC: 12 MG/DL (ref 5–25)
CALCIUM SERPL-MCNC: 9.5 MG/DL (ref 8.3–10.1)
CHLORIDE SERPL-SCNC: 101 MMOL/L (ref 100–108)
CHOLEST SERPL-MCNC: 211 MG/DL (ref 50–200)
CO2 SERPL-SCNC: 26 MMOL/L (ref 21–32)
CREAT SERPL-MCNC: 0.76 MG/DL (ref 0.6–1.3)
ERYTHROCYTE [DISTWIDTH] IN BLOOD BY AUTOMATED COUNT: 14.6 % (ref 11.6–15.1)
EST. AVERAGE GLUCOSE BLD GHB EST-MCNC: 117 MG/DL
FOLATE SERPL-MCNC: >20 NG/ML (ref 3.1–17.5)
GFR SERPL CREATININE-BSD FRML MDRD: 67 ML/MIN/1.73SQ M
GLUCOSE P FAST SERPL-MCNC: 88 MG/DL (ref 65–99)
GLUCOSE SERPL-MCNC: 88 MG/DL (ref 65–140)
HBA1C MFR BLD: 5.7 % (ref 4.2–6.3)
HCT VFR BLD AUTO: 31.7 % (ref 34.8–46.1)
HDLC SERPL-MCNC: 72 MG/DL (ref 40–60)
HGB BLD-MCNC: 10.9 G/DL (ref 11.5–15.4)
LDLC SERPL CALC-MCNC: 121 MG/DL (ref 0–100)
MCH RBC QN AUTO: 31.1 PG (ref 26.8–34.3)
MCHC RBC AUTO-ENTMCNC: 34.4 G/DL (ref 31.4–37.4)
MCV RBC AUTO: 90 FL (ref 82–98)
P AXIS: 72 DEGREES
PLATELET # BLD AUTO: 262 THOUSANDS/UL (ref 149–390)
PMV BLD AUTO: 9.3 FL (ref 8.9–12.7)
POTASSIUM SERPL-SCNC: 3.5 MMOL/L (ref 3.5–5.3)
PR INTERVAL: 196 MS
QRS AXIS: -26 DEGREES
QRSD INTERVAL: 94 MS
QT INTERVAL: 378 MS
QTC INTERVAL: 452 MS
RBC # BLD AUTO: 3.51 MILLION/UL (ref 3.81–5.12)
SODIUM SERPL-SCNC: 134 MMOL/L (ref 136–145)
T WAVE AXIS: 95 DEGREES
TRIGL SERPL-MCNC: 92 MG/DL
VENTRICULAR RATE: 86 BPM
VIT B12 SERPL-MCNC: 536 PG/ML (ref 100–900)
WBC # BLD AUTO: 5.87 THOUSAND/UL (ref 4.31–10.16)

## 2018-07-01 PROCEDURE — 97163 PT EVAL HIGH COMPLEX 45 MIN: CPT

## 2018-07-01 PROCEDURE — 99232 SBSQ HOSP IP/OBS MODERATE 35: CPT | Performed by: PSYCHIATRY & NEUROLOGY

## 2018-07-01 PROCEDURE — G8979 MOBILITY GOAL STATUS: HCPCS

## 2018-07-01 PROCEDURE — 93010 ELECTROCARDIOGRAM REPORT: CPT | Performed by: INTERNAL MEDICINE

## 2018-07-01 PROCEDURE — 83036 HEMOGLOBIN GLYCOSYLATED A1C: CPT | Performed by: PHYSICIAN ASSISTANT

## 2018-07-01 PROCEDURE — 80061 LIPID PANEL: CPT | Performed by: PHYSICIAN ASSISTANT

## 2018-07-01 PROCEDURE — G8978 MOBILITY CURRENT STATUS: HCPCS

## 2018-07-01 PROCEDURE — 80048 BASIC METABOLIC PNL TOTAL CA: CPT | Performed by: PHYSICIAN ASSISTANT

## 2018-07-01 PROCEDURE — 99232 SBSQ HOSP IP/OBS MODERATE 35: CPT | Performed by: PHYSICIAN ASSISTANT

## 2018-07-01 PROCEDURE — 85027 COMPLETE CBC AUTOMATED: CPT | Performed by: PHYSICIAN ASSISTANT

## 2018-07-01 RX ORDER — GABAPENTIN 100 MG/1
100 CAPSULE ORAL 2 TIMES DAILY
Status: DISCONTINUED | OUTPATIENT
Start: 2018-07-01 | End: 2018-07-03 | Stop reason: HOSPADM

## 2018-07-01 RX ADMIN — HEPARIN SODIUM 5000 UNITS: 5000 INJECTION, SOLUTION INTRAVENOUS; SUBCUTANEOUS at 05:57

## 2018-07-01 RX ADMIN — HEPARIN SODIUM 5000 UNITS: 5000 INJECTION, SOLUTION INTRAVENOUS; SUBCUTANEOUS at 14:51

## 2018-07-01 RX ADMIN — PANTOPRAZOLE SODIUM 40 MG: 40 TABLET, DELAYED RELEASE ORAL at 05:57

## 2018-07-01 RX ADMIN — HEPARIN SODIUM 5000 UNITS: 5000 INJECTION, SOLUTION INTRAVENOUS; SUBCUTANEOUS at 21:21

## 2018-07-01 RX ADMIN — LORATADINE 10 MG: 10 TABLET ORAL at 09:34

## 2018-07-01 RX ADMIN — Medication 1000 MG: at 09:34

## 2018-07-01 RX ADMIN — ATORVASTATIN CALCIUM 40 MG: 40 TABLET, FILM COATED ORAL at 17:15

## 2018-07-01 RX ADMIN — GABAPENTIN 100 MG: 100 CAPSULE ORAL at 12:22

## 2018-07-01 RX ADMIN — GABAPENTIN 100 MG: 100 CAPSULE ORAL at 17:15

## 2018-07-01 RX ADMIN — CLOPIDOGREL BISULFATE 75 MG: 75 TABLET ORAL at 21:20

## 2018-07-01 RX ADMIN — FUROSEMIDE 40 MG: 40 TABLET ORAL at 09:34

## 2018-07-01 NOTE — CASE MANAGEMENT
Thank you,  John Aqq  [facility] Utilization Review Department  Phone: [phone]; Fax [phone]  ATTENTION: The Network Utilization Review Department is now centralized for our 9 Facilities  Make a note that we have a new phone and fax numbers for our Department  Please call with any questions or concerns to [phone] and carefully follow the prompts so that you are directed to the right person  All voicemails are confidential  Fax any determinations, approvals, denials, and requests for initial or continue stay review clinical to [phone]  Due to HIGH CALL volume, it would be easier if you could please send faxed requests to expedite your requests and in part, help us provide discharge notifications faster     ===================================================================    Initial Clinical Review    Admission: Date/Time/Statement: 06/30/2018 @ 1242  Orders Placed This Encounter   Procedures    Place in Observation (expected length of stay for this patient is less than two midnights)     Standing Status:   Standing     Number of Occurrences:   1     Order Specific Question:   Admitting Physician     Answer:   Jerica Jennings [[id number]]     Order Specific Question:   Level of Care     Answer:   Med Surg [16]         ED: Date/Time/Mode of Arrival:   ED Arrival Information     Expected Arrival Acuity Means of Arrival Escorted By Service Admission Type    - 6/30/2018 10:58 Urgent Walk-In Family Member General Medicine Urgent    Arrival Complaint    POSSIBLE STROKE          Chief Complaint:   Chief Complaint   Patient presents with    Slurred Speech     patient presents to the ED with c/o slurred speech this AM  Patient alert and oriented        History of Illness:   Liyah Soriano is a 80 y o  female who presents with acute confusion and slurred speech  This occurred around 8:30 this morning, the patient came out to the kitchen and sat down do not feeling well    She then had slurred speech, appeared confused, and was not acting herself per the son  This lasted about 30 min and then resolved  He brought in to the emergency room for further workup      ED Vital Signs:   ED Triage Vitals   Temperature Pulse Respirations Blood Pressure SpO2   06/30/18 1126 06/30/18 1104 06/30/18 1104 06/30/18 1104 06/30/18 1104   98 3 °F (36 8 °C) 89 18 163/77 95 %      Temp Source Heart Rate Source Patient Position - Orthostatic VS BP Location FiO2 (%)   06/30/18 1126 06/30/18 1330 06/30/18 1330 06/30/18 1330 --   Oral Monitor Lying Right arm       Pain Score       06/30/18 1330       No Pain        Wt Readings from Last 1 Encounters:   06/30/18 61 kg (134 lb 7 7 oz)     Abnormal Labs/Diagnostic Test Results:   WBC Thousand/uL 6 37   HEMOGLOBIN g/dL 11 7   HEMATOCRIT % 34 1*   PLATELETS Thousands/uL 304     SODIUM mmol/L 131*   POTASSIUM mmol/L 3 7   CHLORIDE mmol/L 96*   CO2 mmol/L 29   BUN mg/dL 14   CREATININE mg/dL 0 81   CALCIUM mg/dL 9 5   GLUCOSE RANDOM mg/dL 124     CTA head/neck:  Large inhomogeneous right parotid mass, 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       Chronic appearing microangiopathic ischemic changes involving supratentorial white matter       No acute intracranial hemorrhage or mass effect       No evidence of critical stenosis, dissection or occlusion involving cervical carotid or vertebral segments or visualized cerebral arteries           ED Treatment:   Medication Administration from 06/30/2018 1058 to 06/30/2018 1320       Date/Time Order Dose Route Action     06/30/2018 1137 iohexol (OMNIPAQUE) 350 MG/ML injection (MULTI-DOSE) 100 mL 100 mL Intravenous Given          Past Medical/Surgical History:   Past Medical History:   Diagnosis Date    Cardiac disease     GERD (gastroesophageal reflux disease)     Hypertension        Admitting Diagnosis: TIA (transient ischemic attack) [G45 9]  Slurred speech [R47 81]  Transient confusion [R41 0]    Age/Sex: 80 y o  female    Assessment/Plan:   * TIA (transient ischemic attack)   Assessment & Plan     · Probable TIA, rule out CVA  Admit stroke pathway  CT/CTA negative for acute intracranial abnormalities  ? Neurology consult appreciated, discussed with Dr Madison Persaud: EEG, MRI, carotid, TSH, folate, B12, echo pending  ? PT/OT and speech consult  ? PMR consult  ? Already on Plavix, will continue  Consider adding aspirin if MRI is (+) given increased bleeding risk with age/fall risk  ? Fall precautions, patient has degenerative joint disease aunt ambulates with difficulty at baseline  ? Monitor blood pressure, frequent neuro checks per stroke pathway          Mass of parotid gland   Assessment & Plan     · Per son, present for at least 5-6 years, grown a little in the past year so  ? 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  They were recommended against surgery given the proximity to multiple nerves in the possibility for complete hearing loss          Dementia without behavioral disturbance   Assessment & Plan     · Suspected, will check TSH, B12, folate  · Dementia precautions, reoriented as needed, fall precautions                VTE Prophylaxis: Heparin  / noel sequential compression device      Anticipated Length of Stay:  Patient will be admitted on an Observation basis with an anticipated length of stay of  < 2 midnights     Justification for Hospital Stay:  TIA versus CVA workup       Admission Orders:  Scheduled Meds:   Current Facility-Administered Medications:  acetaminophen 650 mg Oral Q6H PRN   atorvastatin 40 mg Oral QPM   calcium carbonate 1,000 mg Oral Daily PRN   clopidogrel 75 mg Oral HS   fish oil 1,000 mg Oral Daily   furosemide 40 mg Oral Daily   heparin (porcine) 5,000 Units Subcutaneous Q8H [facility] & assisted   loratadine 10 mg Oral Daily   ondansetron 4 mg Intravenous Q6H PRN   pantoprazole 40 mg Oral Early Morning   polyethylene glycol 17 g Oral Daily PRN       MRI: pending  Neuro checks q1h x4, q2h x4, then q4h  Dysphagia eval > regular diet  ECHO: pending  EEG: awake / drowsy:  Pending  Consult PT/OT/Speech  TELM

## 2018-07-01 NOTE — CASE MANAGEMENT
Thank you,  John Aqq  [facility] Utilization Review Department  Phone: [phone]; Fax [phone]  ATTENTION: The Network Utilization Review Department is now centralized for our 9 Facilities  Make a note that we have a new phone and fax numbers for our Department  Please call with any questions or concerns to [phone] and carefully follow the prompts so that you are directed to the right person  All voicemails are confidential  Fax any determinations, approvals, denials, and requests for initial or continue stay review clinical to [phone]  Due to HIGH CALL volume, it would be easier if you could please send faxed requests to expedite your requests and in part, help us provide discharge notifications faster     ===========================================================    Continued Stay Review    Date: 07/01/2018    Vital Signs: /69 (BP Location: Left arm)   Pulse 80   Temp 98 1 °F (36 7 °C) (Oral)   Resp 18   Ht 5' (1 524 m)   Wt 61 kg (134 lb 7 7 oz)   SpO2 96%   BMI 26 26 kg/m²     Medications:   Scheduled Meds:   Current Facility-Administered Medications:  acetaminophen 650 mg Oral Q6H PRN   atorvastatin 40 mg Oral QPM   calcium carbonate 1,000 mg Oral Daily PRN   clopidogrel 75 mg Oral HS   fish oil 1,000 mg Oral Daily   furosemide 40 mg Oral Daily   heparin (porcine) 5,000 Units Subcutaneous Q8H [address]   loratadine 10 mg Oral Daily   ondansetron 4 mg Intravenous Q6H PRN   pantoprazole 40 mg Oral Early Morning   polyethylene glycol 17 g Oral Daily PRN     Abnormal Labs/Diagnostic Results:     Age/Sex: 80 y o  female     Assessment/Plan:          * TIA (transient ischemic attack)   Assessment & Plan     · Probable TIA versus seizure  CTA and MRI showing no acute stroke  ? Neurology consult appreciated; pending workup includes EEG, echo   ? A1c pending; total cholesterol elevated 211, , HDL 72, TG 92  ? PT/OT, speech, PMR consult pending  ? Continue Plavix  Given age and fall risk, hold off on adding aspirin given (-) MRI  Statin initiated  ? Fall precautions, patient has degenerative joint disease aunt ambulates with difficulty at baseline  ? Can discontinue stroke pathway as MRI showing no acute stroke          Mass of parotid gland   Assessment & Plan     · Per son, present for at least 5-6 years, no acute intervention at this time, follow up outpatient          Dementia without behavioral disturbance   Assessment & Plan     · Suspected, with normal TSH, B12, folate  · Dementia precautions, reorient as needed, fall precautions  · Patient also very hard of hearing, complete deficits to the left ear             VTE Pharmacologic Prophylaxis:   Pharmacologic: Heparin  Mechanical VTE Prophylaxis in Place:  Yes      Current Length of Stay: 0 day(s)     Current Patient Status: Observation   Certification Statement: The patient, admitted on an observation basis, will now require > 2 midnight hospital stay due to Await EEG and echo; therapy consult pending     Discharge Plan:  Anticipate discharge once EEG and echo obtained

## 2018-07-01 NOTE — PLAN OF CARE
Activity Intolerance/Impaired Mobility     Mobility/activity is maintained at optimum level for patient Progressing        Communication Impairment     Ability to express needs and understand communication [place]     Discharge to home or other facility with appropriate resources Progressing        DISCHARGE PLANNING - CARE MANAGEMENT     Discharge to post-acute care or home with appropriate resources Progressing        INFECTION - ADULT     Absence or prevention of progression during hospitalization Progressing        Knowledge Deficit     Patient/family/caregiver demonstrates understanding of disease process, treatment plan, medications, and discharge instructions Progressing        Neurological Deficit     Neurological status is stable or improving Progressing        Nutrition     Nutrition/Hydration status is improving Progressing        PAIN - ADULT     Verbalizes/displays adequate comfort level or baseline comfort level Progressing        Potential for Aspiration     Non-ventilated patient's risk of aspiration is minimized Progressing        SAFETY ADULT     Patient will remain free of falls Progressing     Maintain or return to baseline ADL function Progressing     Maintain or return mobility status to optimal level Progressing

## 2018-07-01 NOTE — PLAN OF CARE
Problem: PHYSICAL THERAPY ADULT  Goal: Performs mobility at highest level of function for planned discharge setting  See evaluation for individualized goals  Treatment/Interventions: Functional transfer training, LE strengthening/ROM, Elevations, Therapeutic exercise, Endurance training, Patient/family training, Equipment eval/education, Bed mobility, Gait training, Spoke to nursing, Spoke to case management  Equipment Recommended:  (continue with RW)       See flowsheet documentation for full assessment, interventions and recommendations  Prognosis: Good  Problem List: Decreased strength, Decreased endurance, Impaired balance, Decreased mobility, Decreased safety awareness, Decreased cognition, Impaired hearing  Assessment: Pt is 80 y o  female seen for PT evaluation on 7/1/2018 s/p [facility] to [facility] 6/30/2018 w/ TIA (transient ischemic attack)  Probable TIA, rule out CVA  Pt presents with acute confusion, slurred speech, not acting herself per son  Last ~30min and then resolved, brought into ED for further workup  PT consulted to assess pt's functional mobility and d/c needs  Order placed for PT eval and tx, w/ up w/ A order  Performed at least 2 patient identifiers during session: Name and wristband  Comorbidities affecting pt's physical performance at time of assessment include: cardiac disease, GERD, HTN, dementia without behavioral disturbance, mass of parotid gland  PTA, pt was independent w/ all functional mobility w/ walker, ambulates community distances and elevations, ambulates household distances, has 3 JONY, lives alone(son stays with pt overnight) in 2 level home (1st floor setup) and retired   Personal factors affecting pt at time of IE include: inaccessible home environment, ambulating w/ assistive device, stairs to enter home, inability to navigate community distances, inability to navigate level surfaces w/o external assistance, decreased cognition, positive fall history and hearing impairments  Please find objective findings from PT assessment regarding body systems outlined above with impairments and limitations including weakness, impaired balance, decreased endurance, gait deviations, decreased activity tolerance, decreased safety awareness, fall risk and decreased cognition, as well as mobility assessment (need for close SBA assist w/ all phases of mobility when usually ambulating independently and need for cueing for mobility technique)  The following objective measures performed on IE also reveal limitations: Barthel Index: 55/100, Modified [place]: 3 (moderate disability) and Tinetti/WILLOW: 17/28 (high risk for falls)  Pt's clinical presentation is currently unstable/unpredictable seen in pt's presentation of need for input for task focus and mobility technique, on telemetry monitoring, unstable medical status and unclear medical dx, pending further neuro/medical workup  Pt to benefit from continued PT tx to address deficits as defined above and maximize level of functional independent mobility and consistency  From PT/mobility standpoint, recommendation at time of d/c would be Home PT with family support pending progress in order to facilitate return to PLOF  Barriers to Discharge: Inaccessible home environment, Decreased caregiver support     Recommendation: Home PT, Home with family support, OT consult (anticipated pending progress)     PT - OK to Discharge: No    See flowsheet documentation for full assessment

## 2018-07-01 NOTE — ASSESSMENT & PLAN NOTE
· Suspected, with normal TSH, B12, folate  · Dementia precautions, reorient as needed, fall precautions  · Patient also very hard of hearing, complete deficits to the left ear

## 2018-07-01 NOTE — PLAN OF CARE
Problem: DISCHARGE PLANNING - CARE MANAGEMENT  Goal: Discharge to post-acute care or home with appropriate resources  INTERVENTIONS:  - Conduct assessment to determine patient/family and health care team treatment goals, and need for post-acute services based on payer coverage, community resources, and patient preferences, and barriers to discharge  - Address psychosocial, clinical, and financial barriers to discharge as identified in assessment in conjunction with the patient/family and health care team  - Arrange appropriate level of post-acute services according to patient's   needs and preference and payer coverage in collaboration with the physician and health care team  - Communicate with and update the patient/family, physician, and health care team regarding progress on the discharge plan  - Arrange appropriate transportation to post-acute venues   Outcome: Progressing  CM met with pt at bedside  OBS status reviewed and signed  Copy to pt and copy to MR for scanning  Pt lives alone, but her son is at her home most of the time and stays with her through the night  Her daughter also assists with bathing, but most of the time she sponge bathes herself without assistance  There are 3 JONY with railings on both sides  She is able to navigate those 3 steps  She cooks and takes care of her own home  She ambulates with a walker  She has been in rehab in the past-[facility]  Denies substance abuse, tobacco abuse, or mental health issues  Dr Rylan Stack is her PCP  She has an Advanced Directive and her son Wilmer Banegas is her POA  Her son transports to all appointments and will transport home when she is medically cleared  CM discussed d/c plan including STR, but pt wishes to return to her own home  Is open to [facility] services  CM will follow through hospitalization      CM reviewed discharge planning process including the following: identifying help at home, patient preference for discharge planning needs, pharmacy preference, and availability of treatment team to discuss questions or concerns patient and/or family may have regarding understanding medications and recognizing signs and symptoms once discharged  CM also encouraged patient to follow up with all recommended appointments after discharge  Patient advised of importance for patient and family to participate in managing patients medical well being  CM name and role reviewed and Discharge Checklist provided  Encouraged patient and caregiver to review prior to discharge

## 2018-07-01 NOTE — PROGRESS NOTES
Progress Note - Mariana Zelaya 7/21/1923, 80 y o  female MRN: [id number]    Unit/Bed#: -01 Encounter: [id number]    Primary Care Provider: Sunny Guerrero DO   Date and time admitted to hospital: 6/30/2018 10:59 AM        * TIA (transient ischemic attack)   Assessment & Plan    · Probable TIA versus seizure  CTA and MRI showing no acute stroke  · Neurology consult appreciated; pending workup includes EEG, echo   · A1c pending; total cholesterol elevated 211, , HDL 72, TG 92  · PT/OT, speech, PMR consult pending  · Continue Plavix  Given age and fall risk, hold off on adding aspirin given (-) MRI  Statin initiated  · Fall precautions, patient has degenerative joint disease aunt ambulates with difficulty at baseline  · Can discontinue stroke pathway as MRI showing no acute stroke        Mass of parotid gland   Assessment & Plan    · Per son, present for at least 5-6 years, no acute intervention at this time, follow up outpatient        Dementia without behavioral disturbance   Assessment & Plan    · Suspected, with normal TSH, B12, folate  · Dementia precautions, reorient as needed, fall precautions  · Patient also very hard of hearing, complete deficits to the left ear          VTE Pharmacologic Prophylaxis:   Pharmacologic: Heparin  Mechanical VTE Prophylaxis in Place: Yes    Patient Centered Rounds: I have performed bedside rounds with nursing staff today  Discussions with Specialists or Other Care Team Provider:  Neurology    Education and Discussions with Family / Patient:  No family present this time, will update by phone    Time Spent for Care: 20 minutes  More than 50% of total time spent on counseling and coordination of care as described above      Current Length of Stay: 0 day(s)    Current Patient Status: Observation   Certification Statement: The patient, admitted on an observation basis, will now require > 2 midnight hospital stay due to Await EEG and echo; therapy consult pending    Discharge Plan:  Anticipate discharge once EEG and echo obtained    Code Status: Level 1 - Full Code      Subjective:   Patient seen and examined, she is feeling well this morning  Out of bed to chair  No new neurologic symptoms, no recurrence of her episode yesterday  No headache or visual changes  She is very hard of hearing  Per nursing, able to ambulate to the bathroom with rolling walker  Objective:     Vitals:   Temp (24hrs), Av 2 °F (36 8 °C), Min:97 6 °F (36 4 °C), Max:98 7 °F (37 1 °C)    HR:  [63-89] 72  Resp:  [18-20] 18  BP: (117-175)/(56-84) 131/62  SpO2:  [92 %-97 %] 92 %  Body mass index is 26 26 kg/m²  Input and Output Summary (last 24 hours): Intake/Output Summary (Last 24 hours) at 18 1017  Last data filed at 18 2100   Gross per 24 hour   Intake              240 ml   Output              700 ml   Net             -460 ml       Physical Exam:     Physical Exam   Constitutional: She is oriented to person, place, and time  She appears well-developed and well-nourished  No distress  HENT:   Mouth/Throat: Oropharynx is clear and moist    Large right parotid gland mass, unchanged   Cardiovascular: Normal rate, regular rhythm, S1 normal, S2 normal, normal heart sounds and intact distal pulses  No murmur heard  Pulmonary/Chest: Effort normal and breath sounds normal  No respiratory distress  She has no wheezes  She has no rales  Abdominal: Soft  Bowel sounds are normal  She exhibits no distension  There is no tenderness  Musculoskeletal: She exhibits no edema  Neurological: She is alert and oriented to person, place, and time  Nonfocal   Nursing note and vitals reviewed        Additional Data:     Labs:      Results from last 7 days  Lab Units 18  0455   WBC Thousand/uL 5 87   HEMOGLOBIN g/dL 10 9*   HEMATOCRIT % 31 7*   PLATELETS Thousands/uL 262       Results from last 7 days  Lab Units 18  0455   SODIUM mmol/L 134*   POTASSIUM mmol/L 3  5   CHLORIDE mmol/L 101   CO2 mmol/L 26   BUN mg/dL 12   CREATININE mg/dL 0 76   CALCIUM mg/dL 9 5   GLUCOSE RANDOM mg/dL 88       Results from last 7 days  Lab Units 06/30/18  1123   INR  1 05                 * I Have Reviewed All Lab Data Listed Above  * Additional Pertinent Lab Tests Reviewed: All Labs Within Last 24 Hours Reviewed    Imaging:    Imaging Reports Reviewed Today Include:  MRI  Imaging Personally Reviewed by Myself Includes:  Telemetry - leads failure, otherwise no abnormal rhythms    Recent Cultures (last 7 days):           Last 24 Hours Medication List:     Current Facility-Administered Medications:  acetaminophen 650 mg Oral Q6H PRN Josefina Steel PA-C   atorvastatin 40 mg Oral QPM Iselacaitlyn Padgett, PA-ESTEFANY   calcium carbonate 1,000 mg Oral Daily PRN Iselacaitlyn Padgett, PA-C   clopidogrel 75 mg Oral HS Iselacaitlyn Padgett, PA-C   fish oil 1,000 mg Oral Daily Isela E Lorin, PA-C   furosemide 40 mg Oral Daily Iselacaitlyn Padgett, PA-ESTEFANY   heparin (porcine) 5,000 Units Subcutaneous Q8H [address] Iselacaitlyn Padgett, PA-ESTEFANY   loratadine 10 mg Oral Daily Iselacaitlyn Padgett, PA-ESTEFANY   ondansetron 4 mg Intravenous Q6H PRN Isela Padgett, PA-ESTEFANY   pantoprazole 40 mg Oral Early Morning Iselacaitlyn Padgett, PA-ESTEFANY   polyethylene glycol 17 g Oral Daily PRN Josefina Steel PA-C        Today, Patient Was Seen By: Josefina Steel PA-C    ** Please Note: Dictation voice to text software may have been used in the creation of this document   **

## 2018-07-01 NOTE — SOCIAL WORK
CM met with pt at bedside  OBS status reviewed and signed  Copy to pt and copy to MR for scanning  Pt lives alone, but her son is at her home most of the time and stays with her through the night  Her daughter also assists with bathing, but most of the time she sponge bathes herself without assistance  There are 3 JONY with railings on both sides  She is able to navigate those 3 steps  She cooks and takes care of her own home  She ambulates with a walker  She has been in rehab in the past-at [facility] and [facility]  Denies substance abuse, tobacco abuse, or mental health issues  Dr Zahira Steen is her PCP  She has an Advanced Directive and her son Emil Hilton is her POA  Her son transports to all appointments and will transport home when she is medically cleared  CM discussed d/c plan including STR, but pt wishes to return to her own home  Is open to [facility] services  CM will follow through hospitalization  CM reviewed discharge planning process including the following: identifying help at home, patient preference for discharge planning needs, pharmacy preference, and availability of treatment team to discuss questions or concerns patient and/or family may have regarding understanding medications and recognizing signs and symptoms once discharged  CM also encouraged patient to follow up with all recommended appointments after discharge  Patient advised of importance for patient and family to participate in managing patients medical well being  CM name and role reviewed and Discharge Checklist provided  Encouraged patient and caregiver to review prior to discharge

## 2018-07-01 NOTE — SPEECH THERAPY NOTE
Consult received and chart reviewed  Per chart review and discussion with RN, pt passed RN dysphagia assessment  Pt reported to tolerate pills with thin liquids with no s/s of aspiration  However, pt with parotid mass and pt reports need for precautions to avoid s/s dysphagia  Plan: evaluate Monday

## 2018-07-01 NOTE — PHYSICAL THERAPY NOTE
Physical Therapy Evaluation     Patient's Name: Roland Reyes    Admitting Diagnosis  TIA (transient ischemic attack) [G45 9]  Slurred speech [R47 81]  Transient confusion [R41 0]    Problem List  Patient Active Problem List   Diagnosis    TIA (transient ischemic attack)    Mass of parotid gland    Dementia without behavioral disturbance       Past Medical History  Past Medical History:   Diagnosis Date    Cardiac disease     GERD (gastroesophageal reflux disease)     Hypertension        Past Surgical History  History reviewed  No pertinent surgical history  07/01/18 1005   Note Type   Note type Eval only   Pain Assessment   Pain Assessment No/denies pain   Pain Score No Pain   Home Living   Type of [address] [place] [address] Two level;Stairs to enter with rails; Performs ADLs on one level  (1st floor setup, 3 JONY via porch with B handrails)   Bathroom Shower/Tub Tub/shower unit  ("my  was a big man, I have a big tub", pt denies use of such, notes she sponge bathes at sink)   [address] [address] (pt denying any DME)   [facility]  (pt admits to using walker at baseline)   Additional Comments Pt reports she sponge bathes at baseline by sink in bathroom  Prior Function   Level of [place] Independent with ADLs and functional mobility   Lives With Alone   Receives Help From Family   ADL Assistance Independent  (per pt)   IADLs Independent  (per pt- does own cooking, cleaning)   Falls in the last 6 months 0  ((+) fall history, none recently per pt)   Vocational Retired   Comments Pt reports she lives alone, but her son stays with her every night and is in between his home and hers  Pt notes she typically has someone in/out of home regularly during the day, notes she is rarely alone  Pt notes she is independent with sponge bathing at sink  Notes she ambulates independently with walker in the home   Notes family provides A with transportation and family assist with such as well as medication management  Note pt's PLOF and home environment information obtained from pt at time of PT IE, CM to follow up, pt with baseline dementia, ? Reliability  Restrictions/Precautions   Weight Bearing Precautions Per Order No   Braces or Orthoses (none per pt)   Other Precautions Cognitive; Chair Alarm; Bed Alarm;Telemetry; Fall Risk;Hard of hearing   General   Family/Caregiver Present No   Cognition   Overall Cognitive Status Impaired   Arousal/Participation Alert   Orientation Level Oriented to person;Oriented to place  (inconsistent to time)   Memory Decreased recall of recent events;Decreased recall of precautions   Following Commands Follows one step commands without difficulty   Comments pt agreeable to PT IE, [employer]   RUE Assessment   RUE Assessment WFL  (grossly AROM WFL)   LUE Assessment   LUE Assessment WFL  (grossly AROM WFL)   RLE Assessment   RLE Assessment WFL  (grossly assessed with functional mobility: at least 3+/5)   LLE Assessment   LLE Assessment WFL  (grossly assessed with functional mobility: at least 3+/5)   Coordination   Movements are Fluid and Coordinated 1   Sensation WFL  (pt denying any numbness/tingling)   Light Touch   RLE Light Touch Grossly intact   LLE Light Touch Grossly intact   Bed Mobility   Supine to Sit Unable to assess   Additional Comments pt received seated OOB In recliner upon arrival  vitals pre mobility: 72bpm, 18RR, 131/62mmHg, 92% SpO2 on RA   Transfers   Sit to Stand 5  Supervision   Additional items Assist x 1; Armrests; Increased time required;Verbal cues  (close Supervision)   Stand to Sit 5  Supervision   Additional items Assist x 1; Armrests; Increased time required;Verbal cues  (close Supervision)   Ambulation/Elevation   Gait pattern Decreased foot clearance; Short stride; Step to   Gait Assistance 5  Supervision  (close Supervision, no corrected LOB observed)   Additional items Assist x 1;Verbal cues Assistive Device Rolling walker   Distance 30'   Stair Management Assistance Not tested   Balance   Static Sitting Good   Dynamic Sitting Fair +   Static Standing Fair +   Dynamic Standing Fair   Ambulatory Fair   Higher level balance (Tinetti: 17/28 (high risk for falls))   Endurance Deficit   Endurance Deficit Yes   Activity Tolerance   Activity Tolerance Patient tolerated treatment well   Medical Staff Made Aware spoke to Socorro Quinn regarding PT recs   Nurse Made Aware yes, RN Barbie Basilio verbalized pt appropriate to see, made aware of session outcome/recs   Assessment   Prognosis Good   Problem List Decreased strength;Decreased endurance; Impaired balance;Decreased mobility; Decreased safety awareness;Decreased cognition; Impaired hearing   Assessment Pt is 80 y o  female seen for PT evaluation on 7/1/2018 s/p [facility] to [facility] on 6/30/2018 w/ TIA (transient ischemic attack)  Probable TIA, rule out CVA  Pt presents with acute confusion, slurred speech, not acting herself per son  Lasted ~30min and then resolved, brought into ED for further workup  PT consulted to assess pt's functional mobility and d/c needs  Order placed for PT eval and tx, w/ up w/ A order  Performed at least 2 patient identifiers during session: Name and wristband  Comorbidities affecting pt's physical performance at time of assessment include: cardiac disease, GERD, HTN, dementia without behavioral disturbance, mass of parotid gland  PTA, pt was independent w/ all functional mobility w/ walker, ambulates community distances and elevations, ambulates household distances, has 3 JONY, lives alone(son stays with pt overnight) in 2 level home (1st floor setup) and retired   Personal factors affecting pt at time of IE include: inaccessible home environment, ambulating w/ assistive device, stairs to enter home, inability to navigate community distances, inability to navigate level surfaces w/o external assistance, decreased cognition, positive fall history and hearing impairments  Please find objective findings from PT assessment regarding body systems outlined above with impairments and limitations including weakness, impaired balance, decreased endurance, gait deviations, decreased activity tolerance, decreased safety awareness, fall risk and decreased cognition, as well as mobility assessment (need for close SBA assist w/ all phases of mobility when usually ambulating independently and need for cueing for mobility technique)  The following objective measures performed on IE also reveal limitations: Barthel Index: 55/100, Modified [place]: 3 (moderate disability) and Tinetti/WILLOW: 17/28 (high risk for falls)  Pt's clinical presentation is currently unstable/unpredictable seen in pt's presentation of need for input for task focus and mobility technique, on telemetry monitoring, unstable medical status and unclear medical dx, pending further neuro/medical workup  Pt to benefit from continued PT tx to address deficits as defined above and maximize level of functional independent mobility and consistency  From PT/mobility standpoint, recommendation at time of d/c would be Home PT with family support pending progress in order to facilitate return to PLOF     Barriers to Discharge Inaccessible home environment;Decreased caregiver support   Goals   Patient Goals to return home   STG Expiration Date 07/11/18   Short Term Goal #1 In 7-10 days: Increase bilateral LE strength 1/2 grade to facilitate independent mobility, Perform all bed mobility tasks independently to decrease caregiver burden, Perform all transfers modified independent to improve independence, Ambulate > 150 ft  with RW modified independent w/o LOB and w/ normalized gait pattern 100% of the time, Navigate 3 stairs with distant S with bilateral handrails to facilitate return to previous living environment, Increase all balance 1/2 grade to decrease risk for falls, Complete exercise program independently, Tolerate 4 hr OOB to faciliate upright tolerance, Improve Barthel Index score to 70 or greater to facilitate independence and Improve Tinetti/WILLOW score by 4 points to decrease risk for falls   Treatment Day 0   Plan   Treatment/Interventions Functional transfer training;LE strengthening/ROM; Elevations; Therapeutic exercise; Endurance training;Patient/family training;Equipment eval/education; Bed mobility;Gait training;Spoke to nursing;Spoke to case management   PT Frequency 5x/wk   Recommendation   Recommendation Home PT; Home with family support;OT consult  (anticipated pending progress)   Equipment Recommended (continue with RW)   PT - OK to Discharge No   Additional Comments Pt to clear stairs prior to safe d/c disposition home     Modified [place] Scale   Modified [place] Scale 3   Barthel Index   Feeding 10   Bathing 0   Grooming Score 5   Dressing Score 5   Bladder Score 10   Bowels Score 10   Toilet Use Score 5   Transfers (Bed/Chair) Score 10   Mobility (Level Surface) Score 0   Stairs Score 0   Barthel Index Score 55         Davida Pritchett, PT, DPT

## 2018-07-01 NOTE — ASSESSMENT & PLAN NOTE
· Probable TIA versus seizure  CTA and MRI showing no acute stroke  · Neurology consult appreciated; pending workup includes EEG, echo   · A1c pending; total cholesterol elevated 211, , HDL 72, TG 92  · PT/OT, speech, PMR consult pending  · Continue Plavix  Given age and fall risk, hold off on adding aspirin given (-) MRI    Statin initiated  · Fall precautions, patient has degenerative joint disease aunt ambulates with difficulty at baseline  · Can discontinue stroke pathway as MRI showing no acute stroke

## 2018-07-01 NOTE — PROGRESS NOTES
Progress Note - Neurology   Kong Smith 80 y o  female MRN: [id number]  Unit/Bed#: -01 Encounter: [id number]      Subjective:   Patient seen at bedside, sitting out of bed, alert awake oriented, does not know what happened to her yesterday, and has not had any similar events in the past   She claims she has been always very active, but yesterday felt confused when she came in and had some difficulty with her speech, felt to have a TIA and admitted  Patient also describes these episodes of pain radiating down the left arm into the hand frequently associated with tingling and numbness mostly predominant at nighttime  She claims few months ago she had severe neck pain and was told to have arthritis  She denies any other new neurological symptoms at this time  Patient had an MRI of the brain which showed no evidence of any acute CVA  The right parotid mass was redemonstrated on the MRI  ROS: 12 system cued query was unchanged from org  consult note  Vitals:   Vitals:    07/01/18 1100   BP: 141/69   Pulse: 80   Resp: 18   Temp: 98 1 °F (36 7 °C)   SpO2: 96%   ,Body mass index is 26 26 kg/m²      MEDS:    Current Facility-Administered Medications:     acetaminophen (TYLENOL) tablet 650 mg, 650 mg, Oral, Q6H PRN, Isela Padgett PA-C    atorvastatin (LIPITOR) tablet 40 mg, 40 mg, Oral, QPM, Iselacaitlyn Padgett, PA-C, 40 mg at 06/30/18 1706    calcium carbonate (TUMS) chewable tablet 1,000 mg, 1,000 mg, Oral, Daily PRN, Isela Padgett PA-C    clopidogrel (PLAVIX) tablet 75 mg, 75 mg, Oral, HS, Iselacaitlyn Padgett, PA-C, 75 mg at 06/30/18 2145    fish oil capsule 1,000 mg, 1,000 mg, Oral, Daily, Iselacaitlyn Padgett, PA-C, 1,000 mg at 07/01/18 0934    furosemide (LASIX) tablet 40 mg, 40 mg, Oral, Daily, Iselacaitlyn Padgett, PA-C, 40 mg at 07/01/18 0934    heparin (porcine) subcutaneous injection 5,000 Units, 5,000 Units, Subcutaneous, Q8H [facility], 5,000 Units at 07/01/18 0557 **AND** [CANCELED] Platelet count, , , Once, Edie Taco Padgett PA-C    loratadine (CLARITIN) tablet 10 mg, 10 mg, Oral, Daily, Isela Padgett PA-C, 10 mg at 07/01/18 0934    ondansetron (ZOFRAN) injection 4 mg, 4 mg, Intravenous, Q6H PRN, Isela Padgett PA-C    pantoprazole (PROTONIX) EC tablet 40 mg, 40 mg, Oral, Early Morning, Isela Padgett PA-C, 40 mg at 07/01/18 0557    polyethylene glycol (MIRALAX) packet 17 g, 17 g, Oral, Daily PRN, Daina Kaplan PA-C    Physical Exam:  General appearance: alert, appears stated age and cooperative  Head: Normocephalic, without obvious abnormality, atraumatic    Neurologic:  On examination she is extremely hard of hearing but alert awake oriented, speech is fluent, she can obey commands, cranial nerve exam motor and sensory exam remains essentially unchanged with mild left charo sensory dysfunction, with no evidence of any focal deficits  Lab Results: I have personally reviewed pertinent reports  Imaging Studies: I have personally reviewed pertinent reports  Assessment:  1  Anterior circulation TIA versus seizure episode complex partial seizure   2  History of baseline dementia will require further evaluation  3  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

## 2018-07-02 ENCOUNTER — APPOINTMENT (INPATIENT)
Dept: NEUROLOGY | Facility: HOSPITAL | Age: 83
DRG: 069 | End: 2018-07-02
Payer: MEDICARE

## 2018-07-02 ENCOUNTER — APPOINTMENT (INPATIENT)
Dept: NEUROLOGY | Facility: AMBULATORY SURGERY CENTER | Age: 83
DRG: 069 | End: 2018-07-02
Payer: MEDICARE

## 2018-07-02 ENCOUNTER — APPOINTMENT (INPATIENT)
Dept: NON INVASIVE DIAGNOSTICS | Facility: HOSPITAL | Age: 83
DRG: 069 | End: 2018-07-02
Payer: MEDICARE

## 2018-07-02 PROCEDURE — G8988 SELF CARE GOAL STATUS: HCPCS

## 2018-07-02 PROCEDURE — 97116 GAIT TRAINING THERAPY: CPT

## 2018-07-02 PROCEDURE — 95816 EEG AWAKE AND DROWSY: CPT | Performed by: PSYCHIATRY & NEUROLOGY

## 2018-07-02 PROCEDURE — 99222 1ST HOSP IP/OBS MODERATE 55: CPT | Performed by: PHYSICAL MEDICINE & REHABILITATION

## 2018-07-02 PROCEDURE — G8987 SELF CARE CURRENT STATUS: HCPCS

## 2018-07-02 PROCEDURE — 99232 SBSQ HOSP IP/OBS MODERATE 35: CPT | Performed by: NURSE PRACTITIONER

## 2018-07-02 PROCEDURE — 97166 OT EVAL MOD COMPLEX 45 MIN: CPT

## 2018-07-02 PROCEDURE — 93306 TTE W/DOPPLER COMPLETE: CPT

## 2018-07-02 PROCEDURE — 95816 EEG AWAKE AND DROWSY: CPT

## 2018-07-02 PROCEDURE — 92610 EVALUATE SWALLOWING FUNCTION: CPT

## 2018-07-02 PROCEDURE — 93306 TTE W/DOPPLER COMPLETE: CPT | Performed by: INTERNAL MEDICINE

## 2018-07-02 PROCEDURE — 97530 THERAPEUTIC ACTIVITIES: CPT

## 2018-07-02 RX ADMIN — HEPARIN SODIUM 5000 UNITS: 5000 INJECTION, SOLUTION INTRAVENOUS; SUBCUTANEOUS at 06:55

## 2018-07-02 RX ADMIN — PANTOPRAZOLE SODIUM 40 MG: 40 TABLET, DELAYED RELEASE ORAL at 06:55

## 2018-07-02 RX ADMIN — HEPARIN SODIUM 5000 UNITS: 5000 INJECTION, SOLUTION INTRAVENOUS; SUBCUTANEOUS at 22:32

## 2018-07-02 RX ADMIN — ATORVASTATIN CALCIUM 40 MG: 40 TABLET, FILM COATED ORAL at 17:12

## 2018-07-02 RX ADMIN — Medication 1000 MG: at 09:31

## 2018-07-02 RX ADMIN — GABAPENTIN 100 MG: 100 CAPSULE ORAL at 17:12

## 2018-07-02 RX ADMIN — CLOPIDOGREL BISULFATE 75 MG: 75 TABLET ORAL at 22:32

## 2018-07-02 RX ADMIN — LORATADINE 10 MG: 10 TABLET ORAL at 09:31

## 2018-07-02 RX ADMIN — GABAPENTIN 100 MG: 100 CAPSULE ORAL at 09:31

## 2018-07-02 RX ADMIN — FUROSEMIDE 40 MG: 40 TABLET ORAL at 09:31

## 2018-07-02 RX ADMIN — HEPARIN SODIUM 5000 UNITS: 5000 INJECTION, SOLUTION INTRAVENOUS; SUBCUTANEOUS at 13:49

## 2018-07-02 NOTE — SPEECH THERAPY NOTE
Speech-Language Pathology Bedside Swallow Evaluation      Patient Name: Yannick Haskins Date: 7/2/2018       Impression  Pt presents with oral and pharyngeal swallow function WNL  Pt declines further workup or treatment for parotid mass  Recommendations  Continue current Dysphagia 3/Dental Soft and Thin Liquid diet  No ST needs at this time  Problem List  Patient Active Problem List   Diagnosis    TIA (transient ischemic attack)    Mass of parotid gland    Dementia without behavioral disturbance    Transient confusion       Past Medical History  Past Medical History:   Diagnosis Date    Cardiac disease     GERD (gastroesophageal reflux disease)     Hypertension        Past Surgical History  History reviewed  No pertinent surgical history  Current Medical Status  Pt is a 80 y o  female who [facility] with s/sx initially concerning for CVA; imaging findings were negative for acute change and current ddx is TIA vs  seizure  ST consulted 2/2 stroke pathway and presence of large parotid gland mass on R side of neck  At the time of this evaluation, the pt was A+A, denied complaints  Pt's son Tal Jerome was also present for the evaluation  They reported pt eats a diet roughly similar to Dysphagia 3/Dental Soft at home, with Thin Liquids, and that she has never had any difficulty swallowing  They stated that the pt's parotid mass has been present for at least 25 years and that they were told during a previous workup that it could not be removed without risking the pt's hearing in the R ear, so they elected not to have it removed  Imaging Studies:  MRI 6/30    1  No acute infarction, intracranial hemorrhage or intracranial mass  2   Mild, chronic microangiopathy  3   Right parotid mass redemonstrated  Head CT 6/30 Large inhomogeneous right parotid mass, 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   Chronic appearing microangiopathic ischemic changes involving supratentorial white matter  No acute intracranial hemorrhage or mass effect  No evidence of critical stenosis, dissection or occlusion involving cervical carotid or vertebral segments or visualized cerebral arteries  Swallow Information   Current Risks for Dysphagia & Aspiration: AMS (on initial presentation to hospital; sx now resolved and pt is at baseline mental status per son)     Current Symptoms/Concerns: R parotid gland mass    Current Diet: soft/level 3 diet and thin liquids      Baseline Diet: soft/level 3 diet and thin liquids      Baseline Assessment   Behavior/Cognition: alert, oriented to person and place, this is baseline per pt's son    Speech/Language Status: able to participate in basic conversation and able to follow commands    Patient Positioning: upright in chair    Pain Status/Interventions/Response to Interventions:  No report of or nonverbal indications of pain  Swallow Mechanism Exam   Facial: symmetrical  Labial: WFL  Lingual: WFL  Velum: symmetrical  Mandible: adequate ROM  Dentition: limited dentition, few scattered teeth  Vocal quality: clear/adequate   Volitional Cough: strong/productive   Swallow Mechanics: timely and coordinated hyolaryngeal elevation with dry swallow    Extremely large mass easily visible on R side of neck  Consistencies Assessed and Performance   Consistencies Administered: thin liquids and soft solids  Specific materials administered included ice water via straw and saltines broken into small pieces and moistened with water  Oral Stage: WFL  Mastication was adequate with the materials administered today  Bolus formation and transfer were functional with no significant oral residue noted  No overt s/sx reduced oral control  Pharyngeal Stage: [facility]  Swallowing initiation was prompt  Laryngeal rise was palpated and judged to be within functional limits  No coughing, throat clearing, change in vocal quality or respiratory status noted today       Esophageal Concerns: none reported or observed; hx GERD per PMH    Risk for Aspiration:  Low    Recommendations: soft/level 3 diet and thin liquids     Recommended Form of Meds: whole with liquid     Aspiration precautions and compensatory swallowing strategies: upright posture and only feed when fully alert    Results Reviewed with: patient, RN, CRNP and family       [Impressions/Recommendations now located at top]

## 2018-07-02 NOTE — PHYSICAL THERAPY NOTE
Physical Therapy Treatment Note       07/02/18 0950   Pain Assessment   Pain Assessment No/denies pain   Pain Score No Pain   Restrictions/Precautions   Weight Bearing Precautions Per Order No   Braces or Orthoses (none per pt)   Other Precautions Cognitive; Chair Alarm; Bed Alarm;Telemetry; Fall Risk;Hard of hearing   General   Chart Reviewed Yes   Response to Previous Treatment Patient with no complaints from previous session  Family/Caregiver Present No   Cognition   Overall Cognitive Status Impaired   Arousal/Participation Alert; Responsive; Cooperative   Attention Within functional limits   Orientation Level Oriented to person;Oriented to place   Memory Decreased recall of recent events;Decreased recall of precautions   Following Commands Follows one step commands without difficulty   Comments pt agreeable to PT session; very [place]   Subjective   Subjective "I am ready to go home"   Bed Mobility   Rolling R Unable to assess  (pt received OOB in recliner)   Transfers   Sit to Stand 5  Supervision   Additional items Assist x 1; Armrests; Increased time required;Verbal cues   Stand to Sit 5  Supervision   Additional items Assist x 1; Armrests; Verbal cues   Ambulation/Elevation   Gait pattern Decreased foot clearance; Short stride; Step to; Forward Flexion; Excessively slow   Gait Assistance 5  Supervision   Additional items Assist x 1;Verbal cues  (TCs to stand closer to RW)   Assistive Device Rolling walker   Distance 120'   Stair Management Assistance 4  Minimal assist  (CGA for safety)   Additional items Assist x 1;Verbal cues   Stair Management Technique With walker; Step to pattern; Foreward;Backward   Number of Stairs 1  (x3 trials)   Balance   Static Sitting Good   Dynamic Sitting Fair +   Static Standing Fair +   Dynamic Standing Fair   Ambulatory Fair   Endurance Deficit   Endurance Deficit Yes   Activity Tolerance   Activity Tolerance Patient tolerated treatment well   Nurse Made Aware yes, LETICIA Azevedo verbalized pt appropriate to see, made aware of session outcome/recs   Assessment   Prognosis Good   Problem List Decreased strength;Decreased endurance; Impaired balance;Decreased mobility; Decreased safety awareness;Decreased cognition; Impaired hearing   Assessment Pt seen for PT treatment session this date with interventions consisting of gait training w/ emphasis on improving pt's ability to ambulate level surfaces x 120 ft with SBA provided by therapist with RW, therapeutic activity consisting of training: sit<>stand transfers, static standing tolerance for 3 minutes w/ B UE support and vc and tactile cues for static standing posture faciliation and navigating 1 x3 stairs w/ B handrail with step to pattern with CGA  Pt agreeable to PT treatment session upon arrival, pt found seated OOB in recliner, in no apparent distress and responsive  In comparison to previous session, pt with improvements in tolerating greater amb distances w/ less physical A required  Pt w/ no overt LOB, however requiring increased TCs for proper use of RW as pt tends to have increased forward flexion throughout gait cycle  Pt demonstrating increased fatigue w/ elevation training  Post session: pt returned back to recliner, chair alarm engaged, all needs in reach and RN notified of session findings/recommendations  Continue to recommend Home PT with family support at time of d/c in order to maximize pt's functional independence and safety w/ mobility  Pt continues to be functioning below baseline level, and remains limited 2* factors listed above  PT will continue to see pt while here in order to address the deficits listed above and provide interventions consistent w/ POC in effort to achieve STGs     Barriers to Discharge Inaccessible home environment;Decreased caregiver support   Goals   Patient Goals "to return home"   STG Expiration Date 07/11/18   Short Term Goal #1 STGs remain appropriate   Treatment Day 1   Plan   Treatment/Interventions Functional transfer training;LE strengthening/ROM; Elevations; Therapeutic exercise; Endurance training;Patient/family training;Equipment eval/education; Bed mobility;Gait training;Spoke to nursing   Progress Progressing toward goals   PT Frequency 5x/wk   Recommendation   Recommendation Home PT; Home with family support;OT consult   Equipment Recommended Walker   PT - OK to Discharge Yes  (when medically cleared, if w/ increased family support)       Ketan Le PT    Time of PT treatment session: [id number]

## 2018-07-02 NOTE — PLAN OF CARE
Problem: PHYSICAL THERAPY ADULT  Goal: Performs mobility at highest level of function for planned discharge setting  See evaluation for individualized goals  Treatment/Interventions: Functional transfer training, LE strengthening/ROM, Elevations, Therapeutic exercise, Endurance training, Patient/family training, Equipment eval/education, Bed mobility, Gait training, Spoke to nursing, Spoke to case management  Equipment Recommended:  (continue with RW)       See flowsheet documentation for full assessment, interventions and recommendations  Outcome: Progressing  Prognosis: Good  Problem List: Decreased strength, Decreased endurance, Impaired balance, Decreased mobility, Decreased safety awareness, Decreased cognition, Impaired hearing  Assessment: Pt seen for PT treatment session this date with interventions consisting of gait training w/ emphasis on improving pt's ability to ambulate level surfaces x 120 ft with SBA provided by therapist with RW, therapeutic activity consisting of training: sit<>stand transfers, static standing tolerance for 3 minutes w/ B UE support and vc and tactile cues for static standing posture faciliation and navigating 1 x3 stairs w/ B handrail with step to pattern with CGA  Pt agreeable to PT treatment session upon arrival, pt found seated OOB in recliner, in no apparent distress and responsive  In comparison to previous session, pt with improvements in tolerating greater amb distances w/ less physical A required  Pt w/ no overt LOB, however requiring increased TCs for proper use of RW as pt tends to have increased forward flexion throughout gait cycle  Pt demonstrating increased fatigue w/ elevation training  Post session: pt returned back to recliner, chair alarm engaged, all needs in reach and RN notified of session findings/recommendations  Continue to recommend Home PT with family support at time of d/c in order to maximize pt's functional independence and safety w/ mobility   Pt continues to be functioning below baseline level, and remains limited 2* factors listed above  PT will continue to see pt while here in order to address the deficits listed above and provide interventions consistent w/ POC in effort to achieve STGs  Barriers to Discharge: Inaccessible home environment, Decreased caregiver support     Recommendation: Home PT, Home with family support, OT consult     PT - OK to Discharge: Yes (when medically cleared, if w/ increased family support)    See flowsheet documentation for full assessment

## 2018-07-02 NOTE — OCCUPATIONAL THERAPY NOTE
Occupational Therapy Evaluation        Patient Name: Mammie Hatchet OACIE'C Date: 7/2/2018 07/02/18 1408   Note Type   Note type Eval only   Restrictions/Precautions   Weight Bearing Precautions Per Order No   Braces or Orthoses (none per pt)   Pain Assessment   Pain Assessment No/denies pain   Pain Score No Pain   Home Living   Type of [address] Two level;Stairs to enter with rails; Performs ADLs on one level  (1st floor set up, 5 JONY with rails)   Bathroom Shower/Tub Tub/shower unit  (prefers to do a sponge bathing)   Bathroom Toilet Standard   Bathroom Equipment (no DME at baseline)   [facility]   Prior Function   Level of [address] with ADLs and functional mobility   Lives With Alone   Receives Help From Family   ADL Assistance Independent   IADLs Independent  (per pt)   Falls in the last 6 months 0  ((+) fall history, none recently per pt)   Vocational Retired   Lifestyle   Autonomy Patient and Son reporting independnet with ADLs, ambulates with RW    Lives alone in a 2 story house, ist floor set up  Son stays with her overnightt, helps with AM meds, meals, and  transportation to appointments  Reciprocal Relationships Supportive family   Service to Others Retired   Psychosocial   Psychosocial (WDL) [address]  6  Modified independent   [facility] 5  [address] [facility] 5  [address] [facility] 5  [facility] 66  5  [place] [address]  5  [address] [facility]  4  Minimal Assistance   Bed Mobility   Additional Comments Patient OOB to [address], Nursing reports transfers with CG assist with the use of RW   Transfers   Sit to Stand 5  Supervision   Additional items Assist x 1; Increased time required;Verbal cues   Stand to Sit 5 Supervision   Additional items Assist x 1; Increased time required;Verbal cues   Balance   Static Sitting Good   Dynamic Sitting Fair +   Static Standing Fair +   Dynamic Standing Fair +   Activity Tolerance   Activity Tolerance Patient tolerated treatment well   Nurse Made Aware yes, RN Avery Hurtado verbalized pt appropriate to see, made aware of session outcome/recs   RUE Assessment   RUE Assessment WFL  (grossly AROM WFL)   LUE Assessment   LUE Assessment WFL  (grossly AROM WFL)   Hand Function   Gross Motor Coordination Functional   Fine Motor Coordination Functional   Sensation   Light Touch No apparent deficits  (BUEs)   Vision-Basic Assessment   Current Vision Does not wear glasses   Patient Visual Report Other (Comment)  (No significant changes noted)   Cognition   Overall Cognitive Status Impaired   Arousal/Participation Alert; Responsive; Cooperative   Attention Within functional limits   Orientation Level Oriented to person;Oriented to place  (inconsistent to time)   Memory Decreased recall of recent events   Following Commands Follows one step commands without difficulty   AssessmentPatient is a 80 y o  female seen for OT evaluation s/p admit to [address] [place] [address] on 6/30/2018 w/TIA (transient ischemic attack)  Pt presents with acute confusion, slurred speech, not acting herself per son episode lasted ~30min and then resolved, brought into ED for further workup  Commorbidities affecting patient's functional performance at time of assessment include: cardiac disease, GERD, HTN, dementia without behavioral disturbance, mass of parotid gland  Orders placed for OT evaluation and treatment  Performed at least two patient identifiers during session including name and wristband  Prior to admission, Patient and Son reporting independnet with ADLs, ambulates with RW    Lives alone in a 2 story house, ist floor set up  Son stays with her overnightt, helps with AM meds, meals, and  transportation to appointments  Selena Ledezma Personal factors affecting patient at time of initial evaluation include: limited caregiver support and steps to enter  Upon evaluation, patient requires supervision and set up assist for UB ADLs, supervision, set up and contact guard assist for LB ADLs, transfers and functional ambulation in room and bathroom with contact guard and close supervision assist, with the use of [address] [facility]  Occupational performance is affected by the following deficits: degenerative arthritic joint changes, dynamic sit/ stand balance deficit with poor standing tolerance time for self care and functional mobility, decreased activity tolerance and postural control and postural alignment deficit, requiring external assistance to complete transitional movements  Therapist completed expanded review of medical records and additional review of physical, cognitive or psychosocial history, clinical examination identifying 3-5 performance deficits, clinical decision making of a moderate complexity, consistent with moderate complexity level evaluation  Patient to benefit from continued Occupational Therapy treatment while in the hospital to address deficits as defined above and maximize level of functional independence with ADLs and functional mobility  Occupational Performance areas to address include: bathing/ shower, dressing, toilet hygiene, transfer to all surfaces, functional ambulation, functional mobility, IADLs: meal prep/ clean up, Leisure Participation and Social participation  From OT standpoint, recommendation at time of d/c would be Home with family support, [address] and Home OT  Limitation Decreased ADL status; Decreased endurance;Decreased self-care trans;Decreased Safe judgement during ADL;Decreased high-level ADLs   Prognosis Good   Plan   Treatment Interventions ADL retraining;Functional transfer training;UE strengthening/ROM; Endurance training;Equipment evaluation/education; Compensatory technique education;Continued evaluation; Energy conservation; Activityengagement   OT Frequency 2-3x/wk   Recommendation   OT Discharge Recommendation Home OT   OT - OK to Discharge (Home with family support/ Home OT)   Barthel Index   Feeding 10   Bathing 0   Grooming Score 5   Dressing Score 5   Bladder Score 10   Bowels Score 10   Toilet Use Score 5   Transfers (Bed/Chair) Score 10   Mobility (Level Surface) Score 0   Stairs Score 0   Barthel Index Score 55   Modified Blanca Scale   Modified [place] Scale 4     Occupational Therapy goals: In 7-14 days:     1- Patient will verbalize and demonstrate use of energy conservation/ deep breathing technique and work simplification skills during functional activity with no verbal cues  2-Patient will verbalize and demonstrate good body mechanics and joint protection techniques during  ADLs/ IADLs with no verbal cues  3- Patient will increase OOB/ sitting tolerance to 2-4 hours per day for increased participation in self care and leisure tasks with no s/s of exertion  4-Patient will increase standing tolerance time to 5  minutes with unilateral UE support to complete sink level ADLs@ mod I level   5- Patient will increase sitting tolerance at edge of bed to 20 minutes to complete UB ADLs @ set up assist level  6- Patient will transfer bed to Chair / toilet at Set up assist level with AD as indicated  7- Patient will complete UB ADLs with set up assist  8- Patient will complete LB ADLs with min assist with the use of adaptive equipment  9- Patient will complete toileting hygiene with set up assist/ supervision for thoroughness    10-Patient/ Family  will demonstrate competency with UE Home Exercise Program

## 2018-07-02 NOTE — PHYSICAL THERAPY NOTE
Physical Therapy Cancellation Note    Chart review performed  At this time, PT treatment session cancelled as patient is having EEG performed  PT will follow and provide PT interventions as appropriate      Neyda Mar, PT

## 2018-07-02 NOTE — PLAN OF CARE
Problem: Nutrition  Goal: Nutrition/Hydration status is improving  Monitor and assess patient's nutrition/hydration status for malnutrition (ex- brittle hair, bruises, dry skin, pale skin and conjunctiva, muscle wasting, smooth red tongue, and disorientation)  Collaborate with interdisciplinary team and initiate plan and interventions as ordered  Monitor patient's weight and dietary intake as ordered or per policy  Utilize nutrition screening tool and intervene per policy  Determine patient's food preferences and provide high-protein, high-caloric foods as appropriate  - Assist patient with eating   - Allow adequate time for meals   - Encourage patient to take dietary supplement as ordered  - Collaborate with clinical nutritionist   - Include patient/family/caregiver in decisions related to nutrition     Outcome: Progressing

## 2018-07-02 NOTE — PLAN OF CARE
Problem: OCCUPATIONAL THERAPY ADULT  Goal: Performs self-care activities at highest level of function for planned discharge setting  See evaluation for individualized goals  Treatment Interventions: ADL retraining, Functional transfer training, UE strengthening/ROM, Endurance training, Equipment evaluation/education, Compensatory technique education, Continued evaluation, Energy conservation, Activityengagement          See flowsheet documentation for full assessment, interventions and recommendations  Limitation: Decreased ADL status, Decreased endurance, Decreased self-care trans, Decreased Safe judgement during ADL, Decreased high-level ADLs  Prognosis: Good  Assessment: Patient is a 80 y o  female seen for OT evaluation s/p admit to [id number] [place] [address] on 6/30/2018 w/TIA (transient ischemic attack)  Pt presents with acute confusion, slurred speech, not acting herself per son episode lasted ~30min and then resolved, brought into ED for further workup  Commorbidities affecting patient's functional performance at time of assessment include: cardiac disease, GERD, HTN, dementia without behavioral disturbance, mass of parotid gland  Orders placed for OT evaluation and treatment  Performed at least two patient identifiers during session including name and wristband  Prior to admission, Patient and Son reporting independnet with ADLs, ambulates with RW    Lives alone in a 2 story house, ist floor set up  Son stays with her overnightt, helps with AM meds, meals, and  transportation to appointments    Personal factors affecting patient at time of initial evaluation include: limited caregiver support and steps to enter  Upon evaluation, patient requires supervision and set up assist for UB ADLs, supervision, set up and contact guard assist for LB ADLs, transfers and functional ambulation in room and bathroom with contact guard and close supervision assist, with the use of [address] [facility]   Occupational performance is affected by the following deficits: degenerative arthritic joint changes, dynamic sit/ stand balance deficit with poor standing tolerance time for self care and functional mobility, decreased activity tolerance and postural control and postural alignment deficit, requiring external assistance to complete transitional movements  Therapist completed expanded review of medical records and additional review of physical, cognitive or psychosocial history, clinical examination identifying 3-5 performance deficits, clinical decision making of a moderate complexity, consistent with moderate complexity level evaluation  Patient to benefit from continued Occupational Therapy treatment while in the hospital to address deficits as defined above and maximize level of functional independence with ADLs and functional mobility  Occupational Performance areas to address include: bathing/ shower, dressing, toilet hygiene, transfer to all surfaces, functional ambulation, functional mobility, IADLs: meal prep/ clean up, Leisure Participation and Social participation  From OT standpoint, recommendation at time of d/c would be Home with family support, [address] and Home OT         OT Discharge Recommendation: Home OT  OT - OK to Discharge:  (Home with family support/ Home OT)

## 2018-07-02 NOTE — ASSESSMENT & PLAN NOTE
· Probable TIA versus seizure  CTA and MRI showing no acute stroke  · Neurology consult appreciated; pending workup includes EEG which is pending read and echo  · A1c pending; total cholesterol elevated 211, , HDL 72, TG 92  · PT/OT, speech, PMR consult pending  · Continue Plavix  Given age and fall risk, hold off on adding aspirin given (-) MRI  Statin initiated  · Fall precautions, patient has degenerative joint disease aunt ambulates with difficulty at baseline  · Started on Gabapentin 100 mg Po BID to help with painful paresthesias by neuro

## 2018-07-02 NOTE — PROGRESS NOTES
[facility] Internal Medicine  Progress Note - Kong Smith 7/21/1923, 80 y o  female MRN: [id number]    Unit/Bed#: -01 Encounter: [id number]    Primary Care Provider: Jase Pimentel DO   Date and time admitted to hospital: 6/30/2018 10:59 AM      * TIA (transient ischemic attack)   Assessment & Plan    · Probable TIA versus seizure  CTA and MRI showing no acute stroke  · Neurology consult appreciated; pending workup includes EEG which is pending read and echo  · A1c pending; total cholesterol elevated 211, , HDL 72, TG 92  · PT/OT, speech, PMR consult pending  · Continue Plavix  Given age and fall risk, hold off on adding aspirin given (-) MRI  Statin initiated  · Fall precautions, patient has degenerative joint disease aunt ambulates with difficulty at baseline  · Started on Gabapentin 100 mg Po BID to help with painful paresthesias by neuro  Dementia without behavioral disturbance   Assessment & Plan    · Suspected, with normal TSH, B12, folate  · Dementia precautions, reorient as needed, fall precautions  · Patient also very hard of hearing, complete deficits to the left ear        Mass of parotid gland   Assessment & Plan    · Per son, present for at least 5-6 years, no acute intervention at this time, follow up outpatient            Pharmacologic: Heparin  Mechanical VTE Prophylaxis in Place: Yes    Patient Centered Rounds: I have performed bedside rounds with nursing staff today  Discussions with Specialists or Other Care Team Provider: nursing, case management     Education and Discussions with Family / Patient: Patient  Attempted to call son Elvie Womack however cell phone seems to have been disconnected and home phone is without voicemail  Time Spent for Care: 30 minutes  More than 50% of total time spent on counseling and coordination of care as described above  Current Length of Stay: 1 day(s)    Current Patient Status: Inpatient   Certification Statement:  The patient will continue to require additional inpatient hospital stay due to EEG and echo pending as well as PMR consult for discharge planning  Discharge Plan / Estimated Discharge Date: Later today if pending tests are completed and results available  Will discuss with neuro  Need PT/OT and PMR recommendations  If not then probably tomorrow  Code Status: Level 1 - Full Code      Subjective:   Patient states that the pain in her left arm has resolved today  She states that yesterday she had terrible pins and needles in her hand that she could barely move her arm  Denies any headache, dizziness or lightheadedness  Objective:     Vitals:   Temp (24hrs), Av 1 °F (36 7 °C), Min:97 6 °F (36 4 °C), Max:98 7 °F (37 1 °C)    HR:  [71-84] 71  Resp:  [18] 18  BP: (103-135)/(58-70) 107/58  SpO2:  [94 %-98 %] 96 %  Body mass index is 26 26 kg/m²  Input and Output Summary (last 24 hours):        Intake/Output Summary (Last 24 hours) at 18 1110  Last data filed at 18 0405   Gross per 24 hour   Intake              800 ml   Output             1850 ml   Net            -1050 ml       Physical Exam:     Physical Exam    Additional Data:     Labs:      Results from last 7 days  Lab Units 18  0455   WBC Thousand/uL 5 87   HEMOGLOBIN g/dL 10 9*   HEMATOCRIT % 31 7*   PLATELETS Thousands/uL 262       Results from last 7 days  Lab Units 18  0455   SODIUM mmol/L 134*   POTASSIUM mmol/L 3 5   CHLORIDE mmol/L 101   CO2 mmol/L 26   BUN mg/dL 12   CREATININE mg/dL 0 76   CALCIUM mg/dL 9 5   GLUCOSE RANDOM mg/dL 88       Results from last 7 days  Lab Units 18  1123   INR  1 05         Recent Cultures (last 7 days):           Last 24 Hours Medication List:     Current Facility-Administered Medications:  acetaminophen 650 mg Oral Q6H PRN Teresa Bailey PA-C   atorvastatin 40 mg Oral QPM Isela Padgett PA-C   calcium carbonate 1,000 mg Oral Daily PRN Isela Padgett PA-C   clopidogrel 75 mg Oral HS Ethmarietta Gibbs, PA-C   fish oil 1,000 mg Oral Daily Isela Padgett, PA-C   furosemide 40 mg Oral Daily Iselacaitlyn Padgett, PA-C   gabapentin 100 mg Oral BID Arti Uribe MD   heparin (porcine) 5,000 Units Subcutaneous [facility] Megan Padgett, PA-C   loratadine 10 mg Oral Daily Isela Padgett, PA-C   ondansetron 4 mg Intravenous Q6H PRN Isela Padgett, PA-C   pantoprazole 40 mg Oral Early Morning Isela Padgett, PA-C   polyethylene glycol 17 g Oral Daily PRN Alexis Gibbs, PA-C        Today, Patient Was Seen By: Clement Osgood, CRNP    ** Please Note: Dragon 360 Dictation voice to text software may have been used in the creation of this document   **

## 2018-07-02 NOTE — CONSULTS
PHYSICAL MEDICINE AND REHABILITATION CONSULT NOTE  Mammie Hatchet 80 y o  female MRN: [id number]  Unit/Bed#: -01 Encounter: [id number]    Requested by (Physician/Service): Danni Baires, *  Reason for Consultation:  Assessment of rehabilitation needs  Chief Complaint:  Speech dysfunction, acute confusion    History of Present Illness:  Mammie Hatchet is a 80 y o  female who  has a past medical history of Cardiac disease; GERD (gastroesophageal reflux disease); and Hypertension  and presented to the [address] [place] [address] with confusion and gibberish speech  By the time patient got to the emergency room a speech had improved  Patient's confusion and also cleared  Patient is very hard of hearing  She denies any headache or blurry vision  She has gait dysfunction at baseline due to bilateral knee DJD and ambulates with the help of a walker  Neurology was consulted  CT scan of the brain in the emergency room was unremarkable  Patient has a mass along the right parotid which was evaluated 1 year ago and was felt to be benign hence no surgical intervention was offered  She has history of baseline dementia  MRI of the brain revealed no acute infarction, hemorrhage or mass  Restrictions include:  none    Hospital Course/Comorbidities:   Comorbidities:  As above      Last Weight:    Wt Readings from Last 1 Encounters:   06/30/18 61 kg (134 lb 7 7 oz)     Last BMI:  Body mass index is 26 26 kg/m²  Functional History:     Prior to Admission:   Independent with ADLs and functional mobility  Present:  Physical Therapy:  Supervision with ambulation with decreased foot clearance using rolling walker 120 ft  Minimal assistance with stair management, supervision with sit-to-stand and stand to sit transfers  Occupational Therapy:  Modified independent with eating, supervision with grooming, upper body bathing and lower body bathing     Supervision with upper body dressing, lower body dressing and toileting                    Past Medical History:    Past Medical History:   Diagnosis Date    Cardiac disease     GERD (gastroesophageal reflux disease)     Hypertension         Past Surgical History:    History reviewed  No pertinent surgical history  Medications:    Current Facility-Administered Medications:     acetaminophen (TYLENOL) tablet 650 mg, 650 mg, Oral, Q6H PRN, Isela Padgett PA-C    atorvastatin (LIPITOR) tablet 40 mg, 40 mg, Oral, QPM, LESLIE Portillo-ESTEFANY, 40 mg at 07/01/18 1715    calcium carbonate (TUMS) chewable tablet 1,000 mg, 1,000 mg, Oral, Daily PRN, Isela Padgett PA-C    clopidogrel (PLAVIX) tablet 75 mg, 75 mg, Oral, HS, LESLIE Portillo-ESTEFANY, 75 mg at 07/01/18 2120    fish oil capsule 1,000 mg, 1,000 mg, Oral, Daily, Isela Padgett PA-C, 1,000 mg at 07/02/18 0931    furosemide (LASIX) tablet 40 mg, 40 mg, Oral, Daily, Isela Padgett PA-C, 40 mg at 07/02/18 0931    gabapentin (NEURONTIN) capsule 100 mg, 100 mg, Oral, BID, Ginny Walters MD, 100 mg at 07/02/18 0931    heparin (porcine) subcutaneous injection 5,000 Units, 5,000 Units, Subcutaneous, Q8H [facility], 5,000 Units at 07/02/18 1349 **AND** [CANCELED] Platelet count, , , Once, Isela Padgett PA-C    loratadine (CLARITIN) tablet 10 mg, 10 mg, Oral, Daily, LESLIE Portillo-ESTEFANY, 10 mg at 07/02/18 0931    ondansetron (ZOFRAN) injection 4 mg, 4 mg, Intravenous, Q6H PRN, Isela Padgett PA-C    pantoprazole (PROTONIX) EC tablet 40 mg, 40 mg, Oral, Early Morning, LESLIE Portillo-ESTEFANY, 40 mg at 07/02/18 0657    polyethylene glycol (MIRALAX) packet 17 g, 17 g, Oral, Daily PRN, Jason Khan PA-C    Allergies:   No Known Allergies     Family History:    History reviewed  No pertinent family history  Social History:    Social History     Social History    Marital status:       Spouse name: N/A    Number of children: N/A    Years of education: N/A     Social History Main Topics    Smoking status: Never Smoker    Smokeless tobacco: Never Used    Alcohol use No    Drug use: No    Sexual activity: Not Asked     Other Topics Concern    None     Social History Narrative    None        Red Late lives in a two-level house, 1st floor setup, 5 steps to enter with rails  Review of systems:    Neurologic:  Positive for confusion and gibberish speech  All other systems were reviewed and were negative    Physical Exam:  /68 (BP Location: Left arm)   Pulse 85   Temp 97 7 °F (36 5 °C) (Oral)   Resp 18   Ht 5' (1 524 m)   Wt 61 kg (134 lb 7 7 oz)   SpO2 95%   BMI 26 26 kg/m²        Intake/Output Summary (Last 24 hours) at 07/02/18 1539  Last data filed at 07/02/18 1359   Gross per 24 hour   Intake              400 ml   Output             1850 ml   Net            -1450 ml       Body mass index is 26 26 kg/m²  General:  NAD  Heent:  Mucous membranes are moist, large right parotid gland mass  Pulmonary:  Good air entry bilaterally  Cardiovascular:  RRR  Abdomen:  Soft, nontender  Skin/Extremity:  Warm and dry  Neurologic:  Speech is fluent, she obeys commands, cranial nerves 2-12 are intact, hearing is impaired, deep tendon reflexes are 1+ bilaterally in the biceps, triceps and brachioradialis, trace in the knees, toes are downgoing bilaterally  On sensory examination she grimaced this to pinprick more prominently on the right side as compared to the left  No finger-to-nose dysmetria bilaterally  She can lift both upper and lower extremities antigravity with poor effort            Laboratory:    Lab Results   Component Value Date    HGB 10 9 (L) 07/01/2018    HGB 11 5 (L) 06/20/2018    HCT 31 7 (L) 07/01/2018    HCT 33 4 (L) 06/20/2018    WBC 5 87 07/01/2018    WBC 5 4 06/20/2018      Lab Results   Component Value Date    BUN 12 07/01/2018    BUN 15 06/20/2018     (L) 07/01/2018     06/20/2018    K 3 5 07/01/2018    K 3 9 06/20/2018     07/01/2018    CL 97 (L) 06/20/2018    GLUCOSE 88 07/01/2018    CREATININE 0 76 07/01/2018    CREATININE 0 81 06/20/2018      Lab Results   Component Value Date    PROTIME 13 6 06/30/2018    INR 1 05 06/30/2018       Imaging:  Cta Head And Neck With And Without Contrast    Result Date: 6/30/2018  Impression: Large inhomogeneous right parotid mass, 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 Chronic appearing microangiopathic ischemic changes involving supratentorial white matter No acute intracranial hemorrhage or mass effect No evidence of critical stenosis, dissection or occlusion involving cervical carotid or vertebral segments or visualized cerebral arteries Findings personally discussed with Dr Britta Chiang by phone at 12:05 PM, 6/30/2018 Workstation performed: [id number]     Mri Brain Wo Contrast    Result Date: 6/30/2018  Impression: 1  No acute infarction, intracranial hemorrhage or intracranial mass 2  Mild, chronic microangiopathy 3  Right parotid mass redemonstrated  Workstation performed: [id number]       Assessment and Recommendations:  80-year-old female admitted with TIA   Impairments:  Impaired functional mobility and ability to perform ADL's  Impaired cognition and speech    Recommendations:  - Continue PT/OT while inpatient  - Pt is unable to safely return home until she is at the supervision/contact-guard functional level/   modified-independent functional level  - Based upon patient's current functional level, we recommend referral to short-term home assistance with home therapies to allow for achievement of modified-independent functional level      -case discussed with her son who states that patient would like to go home  Thank you for allowing the PM&R service to participate in the care of this patient  We will continue to follow Fidelina Pickens's progress with you   Please do not hesitate to call with questions or concerns    Charmayne Begun, MD   Physical Medicine and Rehabilitation

## 2018-07-02 NOTE — CASE MANAGEMENT
Continued Stay Review --- UPGRADED FROM OBS TO IP     Start   Ordered   07/02/18 0000  Inpatient Admission Once     Transfer Service: General Medicine       Question Answer Comment   Admitting Physician SAVANNAH KENYON    Level of Care Med Surg    Estimated length of stay More than 2 Midnights    Certification I certify that inpatient services are medically necessary for this patient for a duration of greater than two midnights  See H&P and MD Progress Notes for additional information about the patient's course of treatment  07/01/18 0058       Date: 07/02/18    Vital Signs: /56 (BP Location: Left arm)   Pulse 60   Temp 97 8 °F (36 6 °C) (Oral)   Resp 18   Ht 5' (1 524 m)   Wt 61 kg (134 lb 7 7 oz)   SpO2 98%   BMI 26 26 kg/m²     Medications:   Scheduled Meds:   Current Facility-Administered Medications:  acetaminophen 650 mg Oral Q6H PRN Ellen Catherine, PA-C   atorvastatin 40 mg Oral QPM Isela Padgett PA-C   calcium carbonate 1,000 mg Oral Daily PRN Isela Padgett, PA-C   clopidogrel 75 mg Oral HS Iselacaitlyn Padgett, PA-C   fish oil 1,000 mg Oral Daily Iselacaitlyn Padgett, PA-C   furosemide 40 mg Oral Daily Iselacaitlyn Padgett, PA-C   gabapentin 100 mg Oral BID Tamia Mendoza MD   heparin (porcine) 5,000 Units Subcutaneous [facility] LESLIE Carney-C   loratadine 10 mg Oral Daily Iselacaitlyn Padgett, PA-C   ondansetron 4 mg Intravenous Q6H PRN Isela Padgett, PA-C   pantoprazole 40 mg Oral Early Morning Iselacaitlyn Padgett, PA-C   polyethylene glycol 17 g Oral Daily PRN Iselacaitlyn Padgett, PA-C     Continuous Infusions:    PRN Meds:   acetaminophen    calcium carbonate    ondansetron    polyethylene glycol      Age/Sex: 80 y o  female     Assessment/Plan:   * TIA (transient ischemic attack)   Assessment & Plan     · Probable TIA versus seizure  CTA and MRI showing no acute stroke  ? Neurology consult appreciated; pending workup includes EEG which is pending read and echo     ? A1c pending; total cholesterol elevated 211, , HDL 72, TG 92  ? PT/OT, speech, PMR consult pending  ? Continue Plavix  Given age and fall risk, hold off on adding aspirin given (-) MRI  Statin initiated  ? Fall precautions, patient has degenerative joint disease aunt ambulates with difficulty at baseline  ? Started on Gabapentin 100 mg Po BID to help with painful paresthesias by neuro               Dementia without behavioral disturbance   Assessment & Plan     · Suspected, with normal TSH, B12, folate  · Dementia precautions, reorient as needed, fall precautions  · Patient also very hard of hearing, complete deficits to the left ear          Mass of parotid gland   Assessment & Plan     · Per son, present for at least 5-6 years, no acute intervention at this time, follow up outpatient                Pharmacologic: Heparin  Mechanical VTE Prophylaxis in Place: Yes     Current Length of Stay: 1 day(s)     Current Patient Status: Inpatient   Certification Statement: The patient will continue to require additional inpatient hospital stay due to EEG and echo pending as well as PMR consult for discharge planning       Discharge Plan / Estimated Discharge Date: Later today if pending tests are completed and results available  Will discuss with neuro  Need PT/OT and PMR recommendations   If not then probably tomorrow

## 2018-07-02 NOTE — PHYSICIAN ADVISOR
Current patient class: Observation  The patient is currently on Hospital Day: 2 at [address]      The patient was admitted to the hospital at N/A on N/A for the following diagnosis:  TIA (transient ischemic attack) [G45 9]  Slurred speech [R47 81]  Transient confusion [R41 0]       There is documentation in the medical record of an expected length of stay of at least 2 midnights  The patient is therefore expected to satisfy the 2 midnight benchmark and given the 2 midnight presumption is appropriate for INPATIENT ADMISSION  Given this expectation of a satisfying stay, CMS instructs us that the patient is most often appropriate for inpatient admission under part A provided medical necessity is documented in the chart  After review of the relevant documentation, labs, vital signs and test results, the patient is appropriate for INPATIENT ADMISSION  Admission to the hospital as an inpatient is a complex decision making process which requires the practitioner to consider the patients presenting complaint, history and physical examination and all relevant testing  With this in mind, in this case, the patient was deemed appropriate for INPATIENT ADMISSION  After review of the documentation and testing available at the time of the admission I concur with this clinical determination of medical necessity  Rationale is as follows: The patient is a 80 yrs old Female who presented to the ED at 6/30/2018 10:59 AM with a chief complaint of Slurred Speech (patient presents to the ED with c/o slurred speech this AM  Patient alert and oriented )     Given the need for further hospitalization, and along with the documentation of medical necessity present in the chart, the patient is appropriate for inpatient admission  The patient is expected to satisfy the 2 midnight benchmark, and will require further acute medical care   The patient does have comorbid conditions which increases the risk for significant adverse outcome  Given this the patient is appropriate for inpatient admission  The patients vitals on arrival were ED Triage Vitals   Temperature Pulse Respirations Blood Pressure SpO2   06/30/18 1126 06/30/18 1104 06/30/18 1104 06/30/18 1104 06/30/18 1104   98 3 °F (36 8 °C) 89 18 163/77 95 %      Temp Source Heart Rate Source Patient Position - Orthostatic VS BP Location FiO2 (%)   06/30/18 1126 06/30/18 1330 06/30/18 1330 06/30/18 1330 --   Oral Monitor Lying Right arm       Pain Score       06/30/18 1330       No Pain           Past Medical History:   Diagnosis Date    Cardiac disease     GERD (gastroesophageal reflux disease)     Hypertension      History reviewed  No pertinent surgical history          Consults have been placed to:   IP CONSULT TO NEUROLOGY  IP CONSULT TO PHYSICAL MEDICINE REHAB  IP CONSULT TO CASE MANAGEMENT  IP CONSULT TO NUTRITION SERVICES    Vitals:    07/01/18 1100 07/01/18 1500 07/01/18 1900 07/01/18 2300   BP: 141/69 107/65 117/70 135/70   BP Location: Left arm Left arm Left arm Left arm   Pulse: 80 78 84 72   Resp: 18 18 18 18   Temp: 98 1 °F (36 7 °C) 98 °F (36 7 °C) 98 7 °F (37 1 °C) 98 3 °F (36 8 °C)   TempSrc: Oral Oral Oral Oral   SpO2: 96% 98% 95% 95%   Weight:       Height:           Most recent labs:    Recent Labs      06/30/18   1123  07/01/18   0455   WBC  6 37  5 87   HGB  11 7  10 9*   HCT  34 1*  31 7*   PLT  304  262   K  3 7  3 5   NA  131*  134*   CALCIUM  9 5  9 5   BUN  14  12   CREATININE  0 81  0 76   INR  1 05   --    TROPONINI  0 02   --        Scheduled Meds:  Current Facility-Administered Medications:  acetaminophen 650 mg Oral Q6H PRN Regis Kaye Padgett PA-C   atorvastatin 40 mg Oral QPM LESLIE Portillo-C   calcium carbonate 1,000 mg Oral Daily PRN LESLIE Portillo-C   clopidogrel 75 mg Oral HS Iselacaitlyn Padgett PA-C   fish oil 1,000 mg Oral Daily Iselacaitlyn Padgett PA-C   furosemide 40 mg Oral Daily IselaLESLIE Lang-C   gabapentin 100 mg Oral BID Vadim Bonilla MD   heparin (porcine) 5,000 Units Subcutaneous [facility] Kassandra Padgett PA-C   loratadine 10 mg Oral Daily Isela Padgett PA-C   ondansetron 4 mg Intravenous Q6H PRN Isela Padgett PA-C   pantoprazole 40 mg Oral Early Morning Isela Padgett PA-C   polyethylene glycol 17 g Oral Daily PRN Adam Givens PA-C     Continuous Infusions:   PRN Meds:   acetaminophen    calcium carbonate    ondansetron    polyethylene glycol    Surgical procedures (if appropriate):

## 2018-07-03 VITALS
HEART RATE: 75 BPM | BODY MASS INDEX: 26.4 KG/M2 | OXYGEN SATURATION: 96 % | RESPIRATION RATE: 18 BRPM | HEIGHT: 60 IN | SYSTOLIC BLOOD PRESSURE: 117 MMHG | DIASTOLIC BLOOD PRESSURE: 60 MMHG | WEIGHT: 134.48 LBS | TEMPERATURE: 98 F

## 2018-07-03 PROBLEM — G45.9 TIA (TRANSIENT ISCHEMIC ATTACK): Status: RESOLVED | Noted: 2018-06-30 | Resolved: 2018-07-03

## 2018-07-03 PROCEDURE — 97535 SELF CARE MNGMENT TRAINING: CPT

## 2018-07-03 PROCEDURE — 97530 THERAPEUTIC ACTIVITIES: CPT

## 2018-07-03 PROCEDURE — 97116 GAIT TRAINING THERAPY: CPT

## 2018-07-03 PROCEDURE — 99239 HOSP IP/OBS DSCHRG MGMT >30: CPT | Performed by: NURSE PRACTITIONER

## 2018-07-03 PROCEDURE — G0515 COGNITIVE SKILLS DEVELOPMENT: HCPCS

## 2018-07-03 RX ORDER — ATORVASTATIN CALCIUM 40 MG/1
40 TABLET, FILM COATED ORAL EVERY EVENING
Qty: 30 TABLET | Refills: 0 | Status: SHIPPED | OUTPATIENT
Start: 2018-07-03

## 2018-07-03 RX ORDER — GABAPENTIN 100 MG/1
100 CAPSULE ORAL 2 TIMES DAILY
Qty: 60 CAPSULE | Refills: 0 | Status: SHIPPED | OUTPATIENT
Start: 2018-07-03

## 2018-07-03 RX ADMIN — LORATADINE 10 MG: 10 TABLET ORAL at 08:24

## 2018-07-03 RX ADMIN — PANTOPRAZOLE SODIUM 40 MG: 40 TABLET, DELAYED RELEASE ORAL at 06:56

## 2018-07-03 RX ADMIN — GABAPENTIN 100 MG: 100 CAPSULE ORAL at 08:24

## 2018-07-03 RX ADMIN — HEPARIN SODIUM 5000 UNITS: 5000 INJECTION, SOLUTION INTRAVENOUS; SUBCUTANEOUS at 06:56

## 2018-07-03 RX ADMIN — FUROSEMIDE 40 MG: 40 TABLET ORAL at 08:24

## 2018-07-03 RX ADMIN — ACETAMINOPHEN 650 MG: 325 TABLET, FILM COATED ORAL at 00:13

## 2018-07-03 RX ADMIN — Medication 1000 MG: at 08:24

## 2018-07-03 NOTE — PLAN OF CARE
Problem: OCCUPATIONAL THERAPY ADULT  Goal: Performs self-care activities at highest level of function for planned discharge setting  See evaluation for individualized goals  Treatment Interventions: ADL retraining, Functional transfer training, UE strengthening/ROM, Endurance training, Equipment evaluation/education, Compensatory technique education, Continued evaluation, Energy conservation, Activityengagement          See flowsheet documentation for full assessment, interventions and recommendations  Outcome: Progressing  Limitation: Decreased ADL status, Decreased endurance, Decreased self-care trans, Decreased Safe judgement during ADL, Decreased high-level ADLs  Prognosis: Good  Assessment: Patient participated in skilled OT session (Occupational Therapy Program- [facility] [place]) on this date, with interventions consisting of ADL retraining with the use of risk assessment and training in functional mobility, safety awareness and fall prevention techniques for the home setting  Therapist completed focused patient education on correct body mechanics, joint protection techniques, activity tolerance and cognitive strategies, to assist with safe discharge planning/recommendations  Patient agreeable to OT treatment session, upon arrival patient found supine in bed, in no apparent distress  Patient engaged in (Cognitive) Short Blessed Test, which measures orientation, memory, and concentration  Patient scored a 5 out of 28 points, indicating 28/28  Patient completed Timed Up and Go Test which measures balance and risk of falls  Patient completed two trials of the Timed Up and Go Test and scored an average of 67 seconds  For the Timed Up and Go Test a score of 14 seconds or greater has been shown to indicate likelihood of falls  Patient participated in the Functional Reach Test, which measures the patient's margin of stability during functional tasks   Patient completed three trials and scored an average of 7 inches  For the Functional Reach Test a score of 7 inches or below has been shown to be a predictor of limited functional balance and of increased fall risk  The results and conclusions of all three assessments were discussed with the patient thoroughly  Patient verbalized understanding of conclusions and verbalized appreciation of all three assessments  Patient educated on multiple home modifications/safety tips that have proven to be effective in decreasing falls and injuries in the home environment that could be utilized to decrease falls, increase functional independence, and improve safety awareness  Patient verbalized understanding, however, requires reinforcement for carryover  Patient continues to be functioning below baseline  From OT standpoint patient would benefit from continued skilled OT treatment while in the hospital and recommendation at time of discharge would be Home OT       OT Discharge Recommendation: Home OT  OT - OK to Discharge: Yes (when medically cleared)

## 2018-07-03 NOTE — PHYSICAL THERAPY NOTE
Physical Therapy Treatment Note       07/03/18 1203   Pain Assessment   Pain Assessment No/denies pain   Pain Score No Pain   Restrictions/Precautions   Weight Bearing Precautions Per Order No   Other Precautions Cognitive; Chair Alarm; Bed Alarm; Fall Risk;Hard of hearing   General   Chart Reviewed Yes   Response to Previous Treatment Patient with no complaints from previous session  Family/Caregiver Present No   Cognition   Overall Cognitive Status Impaired   Arousal/Participation Alert; Responsive; Cooperative   Attention Within functional limits   Orientation Level Oriented to person;Oriented to place;Oriented to time   Memory Decreased recall of recent events   Following Commands Follows one step commands without difficulty   Comments pt agreeable to PT session   Subjective   Subjective "I am ready to go home"   Bed Mobility   Rolling R Unable to assess  (pt received OOB in recliner)   Transfers   Sit to Stand 5  Supervision   Additional items Assist x 1; Armrests; Increased time required;Verbal cues   Stand to Sit 5  Supervision   Additional items Assist x 1; Armrests; Increased time required;Verbal cues   Toilet transfer 4  Minimal assistance   Additional items Assist x 1;Commode; Increased time required;Armrests  (BSC over toilet frame)   Additional Comments pt tolerating static sitting unsupported x 15 mins at this time  pt demonstrating ability to lean forward during sitting position and translate weight outside YAN x 1 min duration w/ no LOB  Pt able to tolerate static standing intervals x 1 min w/ B UE support on RW   Ambulation/Elevation   Gait pattern Decreased foot clearance; Short stride; Step to   Gait Assistance 5  Supervision   Additional items Assist x 1;Verbal cues; Tactile cues  (TCs for standing w/in safe distance of RW)   Assistive Device Rolling walker   Distance 30' x2   Balance   Static Sitting Good   Dynamic Sitting Fair +   Static Standing Fair +   Dynamic Standing [facility] Endurance Deficit   Endurance Deficit Yes   Activity Tolerance   Activity Tolerance Patient tolerated treatment well;Patient limited by fatigue   Nurse Made Aware yes, RN Marie Luna verbalized pt appropriate to see, made aware of session outcome/recs   Assessment   Prognosis Good   Problem List Decreased strength;Decreased endurance; Impaired balance;Decreased mobility; Decreased safety awareness;Decreased cognition; Impaired hearing   Assessment Pt seen for PT treatment session this date with interventions consisting of gait training w/ emphasis on improving pt's ability to ambulate level surfaces x 30' x2 ft with close S provided by therapist with RW and therapeutic activity consisting of training: sit<>stand transfers, static sitting tolerance unsupported for 15 minutes w/ minimal UE support, static standing tolerance for 1 minutes w/ B UE support, vc and tactile cues for static sitting posture faciliation and vc and tactile cues for static standing posture faciliation  Pt agreeable to PT treatment session upon arrival, pt found seated OOB in recliner, in no apparent distress and responsive  In comparison to previous session, pt with continued requirement for VC/TCs for proper RW use as pt tends to favor forward flexion of trunk  Post session: pt returned back to recliner, chair alarm engaged, all needs in reach and RN notified of session findings/recommendations  Continue to recommend Home PT with family support at time of d/c in order to maximize pt's functional independence and safety w/ mobility  Pt continues to be functioning below baseline level, and remains limited 2* factors listed above  PT will continue to see pt while here in order to address the deficits listed above and provide interventions consistent w/ POC in effort to achieve STGs     Barriers to Discharge Decreased caregiver support   Goals   Patient Goals "to return home"   STG Expiration Date 07/11/18   Short Term Goal #1 STGs remain appropriate Treatment Day 2   Plan   Treatment/Interventions Functional transfer training;LE strengthening/ROM; Elevations; Therapeutic exercise; Endurance training;Patient/family training;Equipment eval/education; Bed mobility;Gait training;Spoke to nursing   Progress Progressing toward goals   PT Frequency 5x/wk   Recommendation   Recommendation Home PT; Home with family support   Equipment Recommended Walker  (continue with RW)   PT - OK to Discharge Yes  (when medically cleared)       Randi Vasquez, PT    Time of PT treatment session: 1219-0581

## 2018-07-03 NOTE — PLAN OF CARE
Problem: PHYSICAL THERAPY ADULT  Goal: Performs mobility at highest level of function for planned discharge setting  See evaluation for individualized goals  Treatment/Interventions: Functional transfer training, LE strengthening/ROM, Elevations, Therapeutic exercise, Endurance training, Patient/family training, Equipment eval/education, Bed mobility, Gait training, Spoke to nursing, Spoke to case management  Equipment Recommended:  (continue with RW)       See flowsheet documentation for full assessment, interventions and recommendations  Outcome: Progressing  Prognosis: Good  Problem List: Decreased strength, Decreased endurance, Impaired balance, Decreased mobility, Decreased safety awareness, Decreased cognition, Impaired hearing  Assessment: Pt seen for PT treatment session this date with interventions consisting of gait training w/ emphasis on improving pt's ability to ambulate level surfaces x 30' x2 ft with close S provided by therapist with RW and therapeutic activity consisting of training: sit<>stand transfers, static sitting tolerance unsupported for 15 minutes w/ minimal UE support, static standing tolerance for 1 minutes w/ B UE support, vc and tactile cues for static sitting posture faciliation and vc and tactile cues for static standing posture faciliation  Pt agreeable to PT treatment session upon arrival, pt found seated OOB in recliner, in no apparent distress and responsive  In comparison to previous session, pt with continued requirement for VC/TCs for proper RW use as pt tends to favor forward flexion of trunk  Post session: pt returned back to recliner, chair alarm engaged, all needs in reach and RN notified of session findings/recommendations  Continue to recommend Home PT with family support at time of d/c in order to maximize pt's functional independence and safety w/ mobility  Pt continues to be functioning below baseline level, and remains limited 2* factors listed above   PT will continue to see pt while here in order to address the deficits listed above and provide interventions consistent w/ POC in effort to achieve STGs  Barriers to Discharge: Decreased caregiver support     Recommendation: Home PT, Home with family support     PT - OK to Discharge: Yes (when medically cleared)    See flowsheet documentation for full assessment

## 2018-07-03 NOTE — DISCHARGE INSTR - AVS FIRST PAGE
· Follow up with your primary care doctor within one week  · See neurology as outpatient  · Continue Plavix, Atorvastatin is a new medication which is for stroke prevention

## 2018-07-03 NOTE — DISCHARGE SUMMARY
Discharge Summary - [facility] Internal Medicine    Patient Information: Sonali Mera 80 y o  female MRN: [id number]  Unit/Bed#: -01 Encounter: [id number]    Discharging Physician / Practitioner: MANUEL Krishnamurthy  PCP: Gauri Sainz DO  Admission Date: 6/30/2018  Discharge Date: 07/03/18    Reason for Admission:  Slurred speech and confusion    Diagnosis at discharge: TIA    Discharge Diagnoses:     Principal Problem (Resolved):    TIA (transient ischemic attack)  Active Problems:    Dementia without behavioral disturbance    Mass of parotid gland  Resolved Problems:    Transient confusion      Consultations During Hospital Stay:  · Neurology  · PT/OT  · PMR  · Speech    Procedures Performed:     · EEG: This Routine EEG recorded during wakefulness and drowsiness is abnormal  Background activities are too slow suggesting mild diffuse cerebral dysfunction of non-specific etiology      Significant Findings / Test Results:     · CT head and neck with and without contrast; Large inhomogeneous right parotid mass, 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  Chronic appearing microangiopathic ischemic changes involving supratentorial white matter  No acute intracranial hemorrhage or mass effect  No evidence of critical stenosis, dissection or occlusion involving cervical carotid or vertebral segments or visualized cerebral arteries  · MRI of the brain :  No acute infarction, intracranial hemorrhage or intracranial mass  Mild chronic microangiopathy  Right parotid mass re-demonstrated  · Echocardiogram 60% EF  No regional wall motion abnormalities  Mild annular calcification of the mitral valve  Mild regurgitation of the tricuspid valve  Mild regurgitation of the pulmonic valve    · Lipid panel total cholesterol 211, triglycerides 92, HDL 72,   · Folate greater than 20  · Vitamin B12 536  · Hemoglobin A1c 5 7  · TSH 3 448  · UA trace leukocytes    Incidental Findings:   · None     Test Results Pending at Discharge (will require follow up): · None      Outpatient Tests Requested:  · Follow up with PCP within one week  · Follow up with neurology as outpatient  · Repeat Lipid panel in 3 months  Complications:  None     Hospital Course:     Stephanie Kevin is a 80 y o  female patient with a past medical history of right parotid mass, dementia, GERD, CAD, hypertension who originally presented to the hospital on 6/30/2018 due to acute confusion and slurred speech  Patient was admitted on the stroke pathway evaluated by Neurology and thought to have a TIA or possible partial complex seizure  EEG did not show any epileptiform activity  She was started on gabapentin 100 mg b i d  for painful left arm paresthesias which did resolve this complaint  Patient had no further episodes of any slurred speech or any change in mental status  Echocardiogram was unremarkable  MRI did not show acute stroke  She was evaluated by Physical and Occupational therapy who recommended home with home services which has been set up by case management  She has been started on atorvastatin as her LDL is above goal   She will need a repeat lipid panel within 3 months  She should follow up with Neurology as outpatient for additional evaluation for cognitive dysfunction  Patient is stable for discharge  Medication changes at discharge:  Atorvastatin 40 mg p o  with evening meal    Condition at Discharge: stable     Discharge Day Visit / Exam:     Subjective:  Patient offers no complaints  Wants to go home  States her left arm is no longer bothering her  Denies any chest pain or shortness of breath  Denies any lightheadedness or dizziness      Vitals: Blood Pressure: 128/65 (07/03/18 0700)  Pulse: 67 (07/03/18 0700)  Temperature: 97 9 °F (36 6 °C) (07/03/18 0700)  Temp Source: Oral (07/03/18 0700)  Respirations: 18 (07/03/18 0700)  Height: 5' (152 4 cm) (06/30/18 1330)  Weight - Scale: 61 kg (134 lb 7 7 oz) (06/30/18 1330)  SpO2: 95 % (07/03/18 0700)     Exam:   Physical Exam    Discussion with Family:  Patient and son Agustina Ochoa over the phone    Discharge instructions/Information to patient and family:   See after visit summary for information provided to patient and family  Provisions for Follow-Up Care:  See after visit summary for information related to follow-up care and any pertinent home health orders  Disposition:     Home with VNA Services (Reminder: Complete face to face encounter)    For Discharges to [facility]:   · Not Applicable to this Patient - Not Applicable to this Patient    Planned Readmission: no     Discharge Statement:  I spent 35 minutes discharging the patient  This time was spent on the day of discharge  I had direct contact with the patient on the day of discharge  Greater than 50% of the total time was spent examining patient, answering all patient questions, arranging and discussing plan of care with patient as well as directly providing post-discharge instructions  Additional time then spent on discharge activities  Discharge Medications:  See after visit summary for reconciled discharge medications provided to patient and family        ** Please Note: This note has been constructed using a voice recognition system **

## 2018-07-03 NOTE — OCCUPATIONAL THERAPY NOTE
OCCUPATIONAL THERAPY TREATMENT  NOTE         07/03/18 0935   Restrictions/Precautions   Weight Bearing Precautions Per Order No   Other Precautions Cognitive; Chair Alarm; Bed Alarm; Fall Risk;Hard of hearing   Pain Assessment   Pain Assessment No/denies pain   Pain Score No Pain   Functional Standing Tolerance   Time (stood at sink approx 2 mins)   Activity ADL - washing hands   Comments RW   Bed Mobility   Rolling R 3  Moderate assistance   Additional items Assist x 1;HOB elevated; Bedrails; Increased time required;Verbal cues   Supine to Sit 4  Minimal assistance   Additional items Assist x 1;HOB elevated; Bedrails; Increased time required;Verbal cues;LE management   Transfers   Sit to Stand 5  Supervision   Additional items Assist x 1; Increased time required;Verbal cues   Stand to Sit 4  Minimal assistance   Additional items Assist x 1; Armrests; Increased time required;Verbal cues   Toilet transfer 4  Minimal assistance   Additional items Assist x 1; Armrests; Increased time required;Verbal cues; Other  (over toilet commode)   Functional Mobility   Functional Mobility 4  Minimal assistance   Additional Comments TUG assessmt: 1 min : 7 secs   Additional items Rolling walker   Cognition   Overall Cognitive Status Impaired   Arousal/Participation Alert; Responsive; Cooperative   Attention Within functional limits   Following Commands Follows one step commands without difficulty   Comments Pt pleasant and agreeable to skilled OT  (Pt agreeable to skilled OT services)   Cognition Assessment Tools Other (Comment)  (Short Blessed Test)   Score (Short Blessed Test -28/28)   Additional Activities   Additional Activities (Functional Reach Test : 7 in - fall risk)   Additional Activities Comments indicated pt potential fall risk   Activity Tolerance   Activity Tolerance Patient tolerated treatment well   Medical Staff Made Aware Aide made aware   Assessment   Assessment Patient participated in skilled OT session (Occupational Therapy Program- [facility] [place]) on this date, with interventions consisting of ADL retraining with the use of risk assessment and training in functional mobility, safety awareness and fall prevention techniques for the home setting  Therapist completed focused patient education on correct body mechanics, joint protection techniques, activity tolerance and cognitive strategies, to assist with safe discharge planning/recommendations  Patient agreeable to OT treatment session, upon arrival patient found supine in bed, in no apparent distress  Patient engaged in (Cognitive) Short Blessed Test, which measures orientation, memory, and concentration  Patient scored a 5 out of 28 points, indicating 28/28  Patient completed Timed Up and Go Test which measures balance and risk of falls  Patient completed two trials of the Timed Up and Go Test and scored an average of 67 seconds  For the Timed Up and Go Test a score of 14 seconds or greater has been shown to indicate likelihood of falls  Patient participated in the Functional Reach Test, which measures the patient's margin of stability during functional tasks  Patient completed three trials and scored an average of 7 inches  For the Functional Reach Test a score of 7 inches or below has been shown to be a predictor of limited functional balance and of increased fall risk  The results and conclusions of all three assessments were discussed with the patient thoroughly  Patient verbalized understanding of conclusions and verbalized appreciation of all three assessments  Patient educated on multiple home modifications/safety tips that have proven to be effective in decreasing falls and injuries in the home environment that could be utilized to decrease falls, increase functional independence, and improve safety awareness  Patient verbalized understanding, however, requires reinforcement for carryover   Patient continues to be functioning below baseline  From OT standpoint patient would benefit from continued skilled OT treatment while in the hospital and recommendation at time of discharge would be Home OT  Plan   Treatment Interventions ADL retraining;Visual perceptual retraining;Functional transfer training;UE strengthening/ROM; Endurance training;Cognitive reorientation;Patient/family training;Equipment evaluation/education; Fine motor coordination activities; Compensatory technique education;Continued evaluation; Energy conservation; Activityengagement   OT Frequency 2-3x/wk   Recommendation   OT Discharge Recommendation Home OT   OT - OK to Discharge Yes  (when medically cleared)     Pt cooperative and agreeable to skilled OT services with focus on improving functional mobility and self care skills  Pt supine in bed upon start of session  Pt able to txfer bed> RW with Min A   Ambulated > toilet with Min A and slow gait  A needed for hygeine during toileting  Pt  educated in safety awareness/ techniques during functional activities  Pt continues to require cues for hand placement during stand >sit  Polly Rosado Pt performance demonstrated inconsistent  carryover of learned techniques and strategies to facilitate safety during self care and daily routine  Pt continues to demonstrate deficits in the areas of self care and functional mobility  Pt would continue to benefit from skilled OT POC to facilitate return to PLOF                                                   KHANG Barriga/REBECCA

## 2018-07-03 NOTE — PLAN OF CARE
Activity Intolerance/Impaired Mobility     Mobility/activity is maintained at optimum level for patient Adequate for Discharge        Communication Impairment     Ability to express needs and understand communication Adequate for Discharge        DISCHARGE PLANNING     Discharge to home or other facility with appropriate resources Adequate for Discharge        INFECTION - ADULT     Absence or prevention of progression during hospitalization Adequate for Discharge        Knowledge Deficit     Patient/family/caregiver demonstrates understanding of disease process, treatment plan, medications, and discharge instructions Adequate for Discharge        Neurological Deficit     Neurological status is stable or improving Adequate for Discharge        Nutrition     Nutrition/Hydration status is improving Adequate for Discharge        PAIN - ADULT     Verbalizes/displays adequate comfort level or baseline comfort level Adequate for Discharge        Potential for Aspiration     Non-ventilated patient's risk of aspiration is minimized Adequate for Discharge        SAFETY ADULT     Patient will remain free of falls Adequate for Discharge     Maintain or return to baseline ADL function Adequate for Discharge     Maintain or return mobility status to optimal level Adequate for Discharge

## 2018-07-25 ENCOUNTER — TRANSCRIBE ORDERS (OUTPATIENT)
Dept: ADMINISTRATIVE | Facility: HOSPITAL | Age: 83
End: 2018-07-25

## 2018-07-25 ENCOUNTER — APPOINTMENT (OUTPATIENT)
Dept: LAB | Facility: CLINIC | Age: 83
End: 2018-07-25
Payer: MEDICARE

## 2018-07-25 DIAGNOSIS — S27.809A RUPTURE OF DIAPHRAGM: ICD-10-CM

## 2018-07-25 DIAGNOSIS — I63.9 STROKE-IN-EVOLUTION SYNDROME (HCC): ICD-10-CM

## 2018-07-25 DIAGNOSIS — M19.90 SENILE ARTHRITIS: ICD-10-CM

## 2018-07-25 DIAGNOSIS — D64.9 ANEMIA, UNSPECIFIED TYPE: ICD-10-CM

## 2018-07-25 DIAGNOSIS — D64.9 ANEMIA, UNSPECIFIED TYPE: Primary | ICD-10-CM

## 2018-07-25 LAB
BASOPHILS # BLD AUTO: 0.06 THOUSANDS/ΜL (ref 0–0.1)
BASOPHILS NFR BLD AUTO: 1 % (ref 0–1)
EOSINOPHIL # BLD AUTO: 0.06 THOUSAND/ΜL (ref 0–0.61)
EOSINOPHIL NFR BLD AUTO: 1 % (ref 0–6)
ERYTHROCYTE [DISTWIDTH] IN BLOOD BY AUTOMATED COUNT: 14.5 % (ref 11.6–15.1)
HCT VFR BLD AUTO: 36.6 % (ref 34.8–46.1)
HGB BLD-MCNC: 12 G/DL (ref 11.5–15.4)
IMM GRANULOCYTES # BLD AUTO: 0.03 THOUSAND/UL (ref 0–0.2)
IMM GRANULOCYTES NFR BLD AUTO: 0 % (ref 0–2)
IRON SERPL-MCNC: 99 UG/DL (ref 50–170)
LYMPHOCYTES # BLD AUTO: 1.82 THOUSANDS/ΜL (ref 0.6–4.47)
LYMPHOCYTES NFR BLD AUTO: 24 % (ref 14–44)
MCH RBC QN AUTO: 31.3 PG (ref 26.8–34.3)
MCHC RBC AUTO-ENTMCNC: 32.8 G/DL (ref 31.4–37.4)
MCV RBC AUTO: 95 FL (ref 82–98)
MONOCYTES # BLD AUTO: 0.65 THOUSAND/ΜL (ref 0.17–1.22)
MONOCYTES NFR BLD AUTO: 9 % (ref 4–12)
NEUTROPHILS # BLD AUTO: 4.89 THOUSANDS/ΜL (ref 1.85–7.62)
NEUTS SEG NFR BLD AUTO: 65 % (ref 43–75)
NRBC BLD AUTO-RTO: 0 /100 WBCS
PLATELET # BLD AUTO: 278 THOUSANDS/UL (ref 149–390)
PMV BLD AUTO: 10.2 FL (ref 8.9–12.7)
RBC # BLD AUTO: 3.84 MILLION/UL (ref 3.81–5.12)
WBC # BLD AUTO: 7.51 THOUSAND/UL (ref 4.31–10.16)

## 2018-07-25 PROCEDURE — 83540 ASSAY OF IRON: CPT

## 2018-07-25 PROCEDURE — 85025 COMPLETE CBC W/AUTO DIFF WBC: CPT

## 2018-07-25 PROCEDURE — 36415 COLL VENOUS BLD VENIPUNCTURE: CPT

## 2018-08-23 ENCOUNTER — TRANSCRIBE ORDERS (OUTPATIENT)
Dept: ADMINISTRATIVE | Facility: HOSPITAL | Age: 83
End: 2018-08-23

## 2018-08-23 ENCOUNTER — APPOINTMENT (OUTPATIENT)
Dept: LAB | Facility: CLINIC | Age: 83
End: 2018-08-23
Payer: MEDICARE

## 2018-08-23 DIAGNOSIS — N39.0 URINARY TRACT INFECTION WITHOUT HEMATURIA, SITE UNSPECIFIED: Primary | ICD-10-CM

## 2018-08-23 DIAGNOSIS — N39.0 URINARY TRACT INFECTION WITHOUT HEMATURIA, SITE UNSPECIFIED: ICD-10-CM

## 2018-08-23 LAB
BACTERIA UR QL AUTO: ABNORMAL /HPF
BILIRUB UR QL STRIP: NEGATIVE
CLARITY UR: CLEAR
COLOR UR: YELLOW
GLUCOSE UR STRIP-MCNC: NEGATIVE MG/DL
HGB UR QL STRIP.AUTO: NEGATIVE
HYALINE CASTS #/AREA URNS LPF: ABNORMAL /LPF
KETONES UR STRIP-MCNC: NEGATIVE MG/DL
LEUKOCYTE ESTERASE UR QL STRIP: ABNORMAL
NITRITE UR QL STRIP: NEGATIVE
NON-SQ EPI CELLS URNS QL MICRO: ABNORMAL /HPF
PH UR STRIP.AUTO: 6.5 [PH] (ref 4.5–8)
PROT UR STRIP-MCNC: NEGATIVE MG/DL
RBC #/AREA URNS AUTO: ABNORMAL /HPF
SP GR UR STRIP.AUTO: 1.01 (ref 1–1.03)
UROBILINOGEN UR QL STRIP.AUTO: 1 E.U./DL
WBC #/AREA URNS AUTO: ABNORMAL /HPF

## 2018-08-23 PROCEDURE — 81001 URINALYSIS AUTO W/SCOPE: CPT | Performed by: FAMILY MEDICINE

## 2018-08-23 PROCEDURE — 87086 URINE CULTURE/COLONY COUNT: CPT

## 2018-08-24 LAB — BACTERIA UR CULT: NORMAL

## 2018-09-17 ENCOUNTER — HOSPITAL ENCOUNTER (INPATIENT)
Facility: HOSPITAL | Age: 83
LOS: 3 days | Discharge: HOME HEALTH | DRG: 689 | End: 2018-09-20
Attending: EMERGENCY MEDICINE | Admitting: INTERNAL MEDICINE
Payer: MEDICARE

## 2018-09-17 ENCOUNTER — APPOINTMENT (EMERGENCY)
Dept: CT IMAGING | Facility: HOSPITAL | Age: 83
DRG: 689 | End: 2018-09-17
Payer: MEDICARE

## 2018-09-17 ENCOUNTER — APPOINTMENT (EMERGENCY)
Dept: RADIOLOGY | Facility: HOSPITAL | Age: 83
DRG: 689 | End: 2018-09-17
Payer: MEDICARE

## 2018-09-17 DIAGNOSIS — N39.0 UTI (URINARY TRACT INFECTION): ICD-10-CM

## 2018-09-17 DIAGNOSIS — S51.801A OPEN WOUND OF RIGHT FOREARM, INITIAL ENCOUNTER: ICD-10-CM

## 2018-09-17 DIAGNOSIS — G45.9 TIA (TRANSIENT ISCHEMIC ATTACK): ICD-10-CM

## 2018-09-17 DIAGNOSIS — R47.81 SLURRED SPEECH: Primary | ICD-10-CM

## 2018-09-17 PROBLEM — R60.0 LOWER LEG EDEMA: Status: ACTIVE | Noted: 2018-09-17

## 2018-09-17 PROBLEM — K21.9 GASTROESOPHAGEAL REFLUX DISEASE WITHOUT ESOPHAGITIS: Status: ACTIVE | Noted: 2018-09-17

## 2018-09-17 PROBLEM — G93.40 ACUTE ENCEPHALOPATHY: Status: ACTIVE | Noted: 2018-09-17

## 2018-09-17 PROBLEM — N30.00 ACUTE CYSTITIS WITHOUT HEMATURIA: Status: ACTIVE | Noted: 2018-09-17

## 2018-09-17 PROBLEM — E78.5 DYSLIPIDEMIA: Status: ACTIVE | Noted: 2018-09-17

## 2018-09-17 PROBLEM — H91.93 BILATERAL HEARING LOSS: Status: ACTIVE | Noted: 2018-09-17

## 2018-09-17 LAB
ABO GROUP BLD: NORMAL
ANION GAP BLD CALC-SCNC: 16 MMOL/L (ref 4–13)
ANION GAP SERPL CALCULATED.3IONS-SCNC: 8 MMOL/L (ref 4–13)
APTT PPP: 33 SECONDS (ref 24–36)
BACTERIA UR QL AUTO: ABNORMAL /HPF
BILIRUB UR QL STRIP: NEGATIVE
BLD GP AB SCN SERPL QL: NEGATIVE
BUN BLD-MCNC: 16 MG/DL (ref 5–25)
BUN SERPL-MCNC: 14 MG/DL (ref 5–25)
CA-I BLD-SCNC: 1.18 MMOL/L (ref 1.12–1.32)
CALCIUM SERPL-MCNC: 9.6 MG/DL (ref 8.3–10.1)
CHLORIDE BLD-SCNC: 93 MMOL/L (ref 100–108)
CHLORIDE SERPL-SCNC: 98 MMOL/L (ref 100–108)
CLARITY UR: CLEAR
CO2 SERPL-SCNC: 31 MMOL/L (ref 21–32)
COLOR UR: YELLOW
CREAT BLD-MCNC: 0.8 MG/DL (ref 0.6–1.3)
CREAT SERPL-MCNC: 0.86 MG/DL (ref 0.6–1.3)
ERYTHROCYTE [DISTWIDTH] IN BLOOD BY AUTOMATED COUNT: 14.6 % (ref 11.6–15.1)
GFR SERPL CREATININE-BSD FRML MDRD: 58 ML/MIN/1.73SQ M
GFR SERPL CREATININE-BSD FRML MDRD: 63 ML/MIN/1.73SQ M
GLUCOSE SERPL-MCNC: 111 MG/DL (ref 65–140)
GLUCOSE SERPL-MCNC: 87 MG/DL (ref 65–140)
GLUCOSE SERPL-MCNC: 93 MG/DL (ref 65–140)
GLUCOSE UR STRIP-MCNC: NEGATIVE MG/DL
HCT VFR BLD AUTO: 36.7 % (ref 34.8–46.1)
HCT VFR BLD CALC: 35 % (ref 34.8–46.1)
HGB BLD-MCNC: 12.3 G/DL (ref 11.5–15.4)
HGB BLDA-MCNC: 11.9 G/DL (ref 11.5–15.4)
HGB UR QL STRIP.AUTO: NEGATIVE
INR PPP: 1.08 (ref 0.86–1.17)
KETONES UR STRIP-MCNC: NEGATIVE MG/DL
LEUKOCYTE ESTERASE UR QL STRIP: ABNORMAL
MCH RBC QN AUTO: 30.9 PG (ref 26.8–34.3)
MCHC RBC AUTO-ENTMCNC: 33.5 G/DL (ref 31.4–37.4)
MCV RBC AUTO: 92 FL (ref 82–98)
NITRITE UR QL STRIP: NEGATIVE
NON-SQ EPI CELLS URNS QL MICRO: ABNORMAL /HPF
PCO2 BLD: 32 MMOL/L (ref 21–32)
PH UR STRIP.AUTO: 8 [PH] (ref 4.5–8)
PLATELET # BLD AUTO: 289 THOUSANDS/UL (ref 149–390)
PMV BLD AUTO: 10.2 FL (ref 8.9–12.7)
POTASSIUM BLD-SCNC: 3.6 MMOL/L (ref 3.5–5.3)
POTASSIUM SERPL-SCNC: 3.4 MMOL/L (ref 3.5–5.3)
PROT UR STRIP-MCNC: NEGATIVE MG/DL
PROTHROMBIN TIME: 13.9 SECONDS (ref 11.8–14.2)
RBC # BLD AUTO: 3.98 MILLION/UL (ref 3.81–5.12)
RBC #/AREA URNS AUTO: ABNORMAL /HPF
RH BLD: POSITIVE
SODIUM BLD-SCNC: 136 MMOL/L (ref 136–145)
SODIUM SERPL-SCNC: 137 MMOL/L (ref 136–145)
SP GR UR STRIP.AUTO: 1.01 (ref 1–1.03)
SPECIMEN EXPIRATION DATE: NORMAL
SPECIMEN SOURCE: ABNORMAL
UROBILINOGEN UR QL STRIP.AUTO: 0.2 E.U./DL
WBC # BLD AUTO: 7.27 THOUSAND/UL (ref 4.31–10.16)
WBC #/AREA URNS AUTO: ABNORMAL /HPF

## 2018-09-17 PROCEDURE — 71045 X-RAY EXAM CHEST 1 VIEW: CPT

## 2018-09-17 PROCEDURE — 83735 ASSAY OF MAGNESIUM: CPT | Performed by: GENERAL PRACTICE

## 2018-09-17 PROCEDURE — 93005 ELECTROCARDIOGRAM TRACING: CPT

## 2018-09-17 PROCEDURE — 70450 CT HEAD/BRAIN W/O DYE: CPT

## 2018-09-17 PROCEDURE — 86901 BLOOD TYPING SEROLOGIC RH(D): CPT | Performed by: EMERGENCY MEDICINE

## 2018-09-17 PROCEDURE — 36415 COLL VENOUS BLD VENIPUNCTURE: CPT | Performed by: EMERGENCY MEDICINE

## 2018-09-17 PROCEDURE — 86850 RBC ANTIBODY SCREEN: CPT | Performed by: EMERGENCY MEDICINE

## 2018-09-17 PROCEDURE — 86900 BLOOD TYPING SEROLOGIC ABO: CPT | Performed by: EMERGENCY MEDICINE

## 2018-09-17 PROCEDURE — 85027 COMPLETE CBC AUTOMATED: CPT | Performed by: EMERGENCY MEDICINE

## 2018-09-17 PROCEDURE — 99223 1ST HOSP IP/OBS HIGH 75: CPT | Performed by: INTERNAL MEDICINE

## 2018-09-17 PROCEDURE — 81001 URINALYSIS AUTO W/SCOPE: CPT

## 2018-09-17 PROCEDURE — 85730 THROMBOPLASTIN TIME PARTIAL: CPT | Performed by: EMERGENCY MEDICINE

## 2018-09-17 PROCEDURE — 70496 CT ANGIOGRAPHY HEAD: CPT

## 2018-09-17 PROCEDURE — 82948 REAGENT STRIP/BLOOD GLUCOSE: CPT

## 2018-09-17 PROCEDURE — 80048 BASIC METABOLIC PNL TOTAL CA: CPT | Performed by: EMERGENCY MEDICINE

## 2018-09-17 PROCEDURE — 70498 CT ANGIOGRAPHY NECK: CPT

## 2018-09-17 PROCEDURE — 85014 HEMATOCRIT: CPT

## 2018-09-17 PROCEDURE — 99215 OFFICE O/P EST HI 40 MIN: CPT | Performed by: PSYCHIATRY & NEUROLOGY

## 2018-09-17 PROCEDURE — 85610 PROTHROMBIN TIME: CPT | Performed by: EMERGENCY MEDICINE

## 2018-09-17 PROCEDURE — 99285 EMERGENCY DEPT VISIT HI MDM: CPT

## 2018-09-17 PROCEDURE — 87086 URINE CULTURE/COLONY COUNT: CPT | Performed by: INTERNAL MEDICINE

## 2018-09-17 PROCEDURE — 80047 BASIC METABLC PNL IONIZED CA: CPT

## 2018-09-17 RX ORDER — NITROGLYCERIN 40 MG/1
0.2 PATCH TRANSDERMAL DAILY
Status: DISCONTINUED | OUTPATIENT
Start: 2018-09-18 | End: 2018-09-20 | Stop reason: HOSPADM

## 2018-09-17 RX ORDER — NITROGLYCERIN 40 MG/1
1 PATCH TRANSDERMAL DAILY
COMMUNITY
Start: 2018-09-17

## 2018-09-17 RX ORDER — SODIUM CHLORIDE 9 MG/ML
125 INJECTION, SOLUTION INTRAVENOUS CONTINUOUS
Status: DISCONTINUED | OUTPATIENT
Start: 2018-09-17 | End: 2018-09-18

## 2018-09-17 RX ORDER — MELATONIN
1000 DAILY
Status: DISCONTINUED | OUTPATIENT
Start: 2018-09-17 | End: 2018-09-20 | Stop reason: HOSPADM

## 2018-09-17 RX ORDER — GABAPENTIN 100 MG/1
100 CAPSULE ORAL 2 TIMES DAILY
Status: DISCONTINUED | OUTPATIENT
Start: 2018-09-17 | End: 2018-09-20 | Stop reason: HOSPADM

## 2018-09-17 RX ORDER — PANTOPRAZOLE SODIUM 40 MG/1
40 TABLET, DELAYED RELEASE ORAL DAILY
Status: DISCONTINUED | OUTPATIENT
Start: 2018-09-17 | End: 2018-09-20 | Stop reason: HOSPADM

## 2018-09-17 RX ORDER — LORATADINE 10 MG/1
10 TABLET ORAL DAILY
Status: DISCONTINUED | OUTPATIENT
Start: 2018-09-17 | End: 2018-09-20 | Stop reason: HOSPADM

## 2018-09-17 RX ORDER — FUROSEMIDE 40 MG/1
40 TABLET ORAL DAILY
Status: DISCONTINUED | OUTPATIENT
Start: 2018-09-17 | End: 2018-09-20 | Stop reason: HOSPADM

## 2018-09-17 RX ORDER — CHLORAL HYDRATE 500 MG
1000 CAPSULE ORAL DAILY
Status: DISCONTINUED | OUTPATIENT
Start: 2018-09-17 | End: 2018-09-20 | Stop reason: HOSPADM

## 2018-09-17 RX ORDER — ATORVASTATIN CALCIUM 40 MG/1
40 TABLET, FILM COATED ORAL EVERY EVENING
Status: DISCONTINUED | OUTPATIENT
Start: 2018-09-17 | End: 2018-09-20 | Stop reason: HOSPADM

## 2018-09-17 RX ORDER — FERROUS SULFATE 325(65) MG
325 TABLET ORAL DAILY
Status: DISCONTINUED | OUTPATIENT
Start: 2018-09-17 | End: 2018-09-20 | Stop reason: HOSPADM

## 2018-09-17 RX ORDER — CLOPIDOGREL BISULFATE 75 MG/1
75 TABLET ORAL
Status: DISCONTINUED | OUTPATIENT
Start: 2018-09-17 | End: 2018-09-20 | Stop reason: HOSPADM

## 2018-09-17 RX ORDER — HEPARIN SODIUM 5000 [USP'U]/ML
5000 INJECTION, SOLUTION INTRAVENOUS; SUBCUTANEOUS EVERY 8 HOURS SCHEDULED
Status: DISCONTINUED | OUTPATIENT
Start: 2018-09-17 | End: 2018-09-20 | Stop reason: HOSPADM

## 2018-09-17 RX ADMIN — CEFTRIAXONE 2000 MG: 2 INJECTION, SOLUTION INTRAVENOUS at 13:38

## 2018-09-17 RX ADMIN — GABAPENTIN 100 MG: 100 CAPSULE ORAL at 19:38

## 2018-09-17 RX ADMIN — CLOPIDOGREL BISULFATE 75 MG: 75 TABLET ORAL at 22:25

## 2018-09-17 RX ADMIN — IOHEXOL 85 ML: 350 INJECTION, SOLUTION INTRAVENOUS at 12:00

## 2018-09-17 RX ADMIN — SODIUM CHLORIDE 125 ML/HR: 0.9 INJECTION, SOLUTION INTRAVENOUS at 13:38

## 2018-09-17 RX ADMIN — ATORVASTATIN CALCIUM 40 MG: 40 TABLET, FILM COATED ORAL at 20:24

## 2018-09-17 RX ADMIN — HEPARIN SODIUM 5000 UNITS: 5000 INJECTION, SOLUTION INTRAVENOUS; SUBCUTANEOUS at 22:25

## 2018-09-17 RX ADMIN — SODIUM CHLORIDE 125 ML/HR: 0.9 INJECTION, SOLUTION INTRAVENOUS at 23:49

## 2018-09-17 NOTE — CONSULTS
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, with similar presentation in June today at 9:30 AM was noted to be generally weak with some mild slurred speech, mild blurry vision in no particular field with a mild bifrontal headache  Her son called EMS and a stroke alert was activated here  She herself cannot give me a good history  She is extremely hard of hearing and that very much limited history  However she denies any focal neurological deficits currently  MRI in June was negative for acute CVA and CTA h/N showed no critical stenosis  She was discharged with diagnosis of TIA at that time  PAST MEDICAL HISTORY  Past Medical History:   Diagnosis Date    Anemia     Cardiac disease     GERD (gastroesophageal reflux disease)     Hypertension     Stroke ([address] )         PAST SURGICAL HISTORY  Past Surgical History:   Procedure Laterality Date    APPENDECTOMY      CHOLECYSTECTOMY      FRACTURE SURGERY      HYSTERECTOMY          ALLERGIES: Patient has no known allergies  CURRENT MEDICATIONS  Scheduled Meds:  Continuous Infusions:  No current facility-administered medications for this encounter  PRN Meds:  SOCIAL HISTORY   reports that she has never smoked  She has never used smokeless tobacco  She reports that she does not drink alcohol or use drugs  FAMILY HISTORY  History reviewed  No pertinent family history  REVIEW OF SYSTEMS   Attendant 12 point review of systems with the patient however she was unable to complete it due the really bad hearing      PHYSICAL EXAMINATION  Temp:  [98 5 °F (36 9 °C)] 98 5 °F (36 9 °C)  HR:  [81-88] 81  Resp:  [16-18] 16  BP: (122-127)/(62-67) 127/67   General Examination: In no apparent distress, well developed and well nourished, and cooperative   HEENT: Normocephalic, Atraumatic  Moist mucus membranes  Anicteric  PPC  Massive painless right parotid mass  CVS: Regular rate and rhythm  S1 S2 noted  No audible murmurs  No carotids bruits  Peripheral pulses palpable throughout   Lungs: Clear to auscultation bilaterally  No rales, rhonchi, wheezing  Abdomen: Bowel sounds positive  Non- tender  Non-distended  No organomegaly  Ext:  Trace pedal edema bilaterally, left greater than right  Psych:  Unable to assess accurately  No active visual hallucinations or auditory hallucinations however  Skin - No rash    Neurological Examination:   Mental Status: The patient was awake, alert, attentive, oriented to person, place, and time  Able in name, repeat, follow commands intermittently when she could actually hear something  Cranial Nerves:   I: smell Not tested   II: visual fields Full to threat  Pupils equal, round, reactive to light with normal accomodation  Fundus:  Unable to visualize fundus      III,IV,VI: extraocular muscles EOMI, no nystagmus   V:  Sensation intact to light touch bilateral V1 to V3 distributions  VII: Face is symmetric with no weakness noted except trace left nasal labial fold flattening  VIII: Audition markedly diminished bilaterally  IX/X: Uvula midline  Soft palate elevation symmetric  XI: Trapezius and SCM strength 5/5 B/L  XII: Tongue midline with no atrophy or fasciculations with appropriate movement  Motor Examination:   No prior drift full strength bilateral upper extremities  Baseline weak proximally in both legs right proximal weakness worse than left proximal weakness distally 5/5 strength all chronic  Reflexes:   Trace throughout  Toes down  Carrie negative      Coordination:  No dysmetria with finger-nose testing and rapid foot tapping  Sensory:  Nonlateralizing to all modalities  Gait:  Deferred due to fall risk  Labs:  Recent Results (from the past 24 hour(s))   Fingerstick Glucose (POCT)    Collection Time: 09/17/18 11:42 AM   Result Value Ref Range    POC Glucose 111 65 - 140 mg/dl            panel      Invalid input(s): BICARBONATE          Invalid input(s): LABPT       Invalid input(s): POTASSIUM, CHLORIDE, GFR Lab Results   Component Value Date    ALT 8 06/20/2018    AST 14 06/20/2018    ALKPHOS 70 06/20/2018          Invalid input(s): TRIGLYCERIDE Lab Results   Component Value Date    HGBA1C 5 7 07/01/2018    TSH i: No results found for: TSH Imaging: CT head     CT head -   No acute indication hemorrhage seen  No CT signs of acute territorial infarction  Large right parotid mass, stable  Extensive atherosclerotic changes seen within the vertebrobasilar system, carotid vessels    ASSESSMENT/PLAN  A 80year-old female with diffuse weakness in addition to some mild headache potential source speech  This is similar to the event that happened in June  Although her diagnosis of time with TIA, I suspect this was probably not a neuro vascular event  She does not want any heroic measures, and due the fact this is probably not a stroke and she is not on any aggressive measures we held off on the CT of the head and neck to look for any retrievable lesions  For my perspective she has a toxic metabolic workup  Her son did tell me her blood pressure is labile to be as low as 80 90 systolic 589 31V  orthostatic vitals  A single MRI of the brain is reasonable but otherwise I would hold off any further stroke workup  Continue home dose Plavix, statin  Euglycemia (140-180 goal)   Normothermia     Total critical care time during this stroke alert 32 minutes  All imaging personally reviewed

## 2018-09-17 NOTE — H&P
History and Physical - [facility] Internal Medicine    Patient Information: Red Late 80 y o  female MRN: [id number]  Unit/Bed#: ED 11 Encounter: [id number]  Admitting Physician: Alexys Hi MD  PCP: Erica Sen DO  Date of Admission:  09/17/18    Assessment/Plan:    Hospital Problem List:     Principal Problem:    Acute encephalopathy  Active Problems:    TIA (transient ischemic attack)    Mass of parotid gland    Dementia without behavioral disturbance    Acute cystitis without hematuria    Dyslipidemia    Gastroesophageal reflux disease without esophagitis    Bilateral hearing loss    Lower leg edema    Present on Admission:   Dementia without behavioral disturbance   Acute encephalopathy   TIA (transient ischemic attack)   Mass of parotid gland   Acute cystitis without hematuria   Dyslipidemia   Gastroesophageal reflux disease without esophagitis   Bilateral hearing loss   Lower leg edema      Plan for the Primary Problem(s):  · Acute encephalopathy  Exact etiology not clear  Patient has only 4-10 WBCs in the urine and positive for leukocyte Estrace  Symptoms could be secondary to UTI-present on admission  She also has history of TI A  Initial CTA unremarkable as she was a stroke alert  Would ask for an MRI of her brain and a carotid ultrasound  Discussed with patient's son  No other aggressive treatment  Continue with Plavix  Neurology consult pending  PT/O T  May need short-term rehab  Her mental status is very much back to baseline  ? Need for echocardiogram/EEG  · Dyslipidemia  Continue with statin  · Bilateral lower extremities edema-chronic  Patient is on diuretic chronically  She recently had an echocardiogram which showed normal ejection fraction  · Chronic parotid gland mass  No further recommendations or treatment  · Hearing loss-quite significant  She wears hearing aid and it does not help much either  · UTI-present on admission    On antibiotics and follow up final cultures  I have requested urine cultures to be sent as this may not happen as patient's urinalysis only has 4-10 WBCs  · Wound-right forearm  Patient had her skin ripped off couple of weeks or so ago  Would ask for wound care consultation  Currently she has dressing on    Plan for Additional Problems:   · As above    VTE Prophylaxis: Heparin  / sequential compression device   Code Status:  DNR/DNI  POLST: There is no POLST form on file for this patient (pre-hospital)    Anticipated Length of Stay:  Patient will be admitted on an Inpatient basis with an anticipated length of stay of  more than 2 midnights  Justification for Hospital Stay:  UTI/acute encephalopathy-follow up final cultures which would take at least couple of nights-IV antibiotics    Total Time for Visit, including Counseling / Coordination of Care: 45 minutes  Greater than 50% of this total time spent on direct patient counseling and coordination of care  Chief Complaint:   Change in mental status    History of Present Illness:    Nereyda Lau is a 80 y o  female who presents with change in mental status  Patient is pretty hard of hearing  Her son is at bedside  Her son was working in the lawn and after he finished, he went check on her when he found her that she was asking that she needed help  She could not see and also she was not stable with her walker  Her speech was off  Called EMS and was brought into the hospital   Her symptoms are actually all gone now  Patient also was complaining of some pain in her neck and head when this happened       Patient also urinate quite frequently as she is on water pills given the fact that her legs swell up if she is off the diuretics for little while  She also fell and had skin laceration of her right forearm  Son has been changing the dressing  She was seen by primary care physician for that as well  Review of Systems    All systems are reviewed   Positive as per history of presenting illness  Patient answered no to all other questions  Otherwise patient unable to give much information as she is very hard of hearing and also has some underlying dementia  Past Medical and Surgical History:     Past Medical History:   Diagnosis Date    Anemia     Cardiac disease     GERD (gastroesophageal reflux disease)     Hypertension     Stroke [facility])        Past Surgical History:   Procedure Laterality Date    APPENDECTOMY      CHOLECYSTECTOMY      FRACTURE SURGERY      HYSTERECTOMY         Meds/Allergies:    Prior to Admission medications    Medication Sig Start Date End Date Taking?  Authorizing Provider   atorvastatin (LIPITOR) 40 mg tablet Take 1 tablet (40 mg total) by mouth every evening 7/3/18  Yes MANUEL Moreno   calcium carbonate (OS-MAURISIO) 600 MG tablet Take 600 mg by mouth daily   Yes Historical Provider, MD   clopidogrel (PLAVIX) 75 mg tablet Take 75 mg by mouth daily at bedtime     Yes Historical Provider, MD   Ferrous Sulfate (IRON) 325 (65 Fe) MG TABS Take by mouth   Yes Historical Provider, MD   furosemide (LASIX) 40 mg tablet Take 40 mg by mouth daily   Yes Historical Provider, MD   gabapentin (NEURONTIN) 100 mg capsule Take 1 capsule (100 mg total) by mouth 2 (two) times a day 7/3/18  Yes MANUEL Moreno   loratadine (CLARITIN) 10 mg tablet Take 10 mg by mouth daily   Yes Historical Provider, MD   Omega-3 Fatty Acids (FISH OIL) 1200 MG CAPS Take by mouth   Yes Historical Provider, MD   pantoprazole (PROTONIX) 40 mg tablet Take 40 mg by mouth daily   Yes Historical Provider, MD   Vitamin D, Cholecalciferol, 1000 units CAPS Take by mouth   Yes Historical Provider, MD   vitamin E, tocopherol, (CVS VITAMIN E) 1,000 units capsule Take 1,000 Units by mouth daily   Yes Historical Provider, MD   omega-3-acid ethyl esters (LOVAZA) 1 g capsule Take 1 g by mouth daily    Historical Provider, MD     Reviewed as documented above    Allergies: No Known Allergies    Social History:     Marital Status:    Occupation:  She does not work  Patient Pre-hospital Living Situation:  Lives alone  Patient Pre-hospital Level of Mobility:  With walker  Patient Pre-hospital Diet Restrictions:  None  Substance Use History:   History   Alcohol Use No     History   Smoking Status    Never Smoker   Smokeless Tobacco    Never Used     History   Drug Use No       Family History:    Family history is reviewed and is not pertinent to her current illness        Physical Exam      Vitals:   Blood Pressure: 116/71 (09/17/18 1330)  Pulse: 84 (09/17/18 1330)  Temperature: 98 5 °F (36 9 °C) (09/17/18 1119)  Temp Source: Oral (09/17/18 1119)  Respirations: (!) 29 (09/17/18 1330)  Height: 5' (152 4 cm) (09/17/18 1119)  Weight - Scale: 57 kg (125 lb 10 6 oz) (09/17/18 1210)  SpO2: 97 % (09/17/18 1330)    Vital signs are reviewed as above  Constitutional:   Elderly 80years old female who is on the bedpan in the room  She is not in any respiratory distress although respiratory rate is noted at 29  Eyes: EOM grossly intact  Conjunctivae slightly pale  Patient has anicteric sclera  HENT:  She is wearing hearing aid on her right side  She has a large right-sided parotid mass  Oropharynx are slightly dry  Did not notice any significant lesions on the tongue  Neck: Neck is supple  I was not able to visualize any JVD at 90°  There is no significant lymphadenopathy  I also did not notice any significant thyromegaly  Cardiac: I did not hear any rubs or gallop  Patient appears to be in sinus rhythm  Respiratory: Patient not in significant respiratory distress  Air entry in general is fair anterolaterally  GI: Abdomen is soft  It is grossly nontender  Bowel sounds are adequate  I was not able to appreciate any hepatosplenomegaly  Neurologic:  Patient is awake and alert  Neurological examination is grossly intact    Examination is limited because of her being almost deaf   Skin:  She has skin break on her right forearm  Currently has dressing on  Psychiatric:  Difficult to assess  Musculoskeletal   Patient has chronic arthritic joints and as above  Extremities: Patient has no significant cyanosis, clubbing, or lower extremity edema           Additional Data:     Lab Results: I have personally reviewed pertinent reports  Results from last 7 days  Lab Units 09/17/18  1231 09/17/18  1207   WBC Thousand/uL  --  7 27   HEMOGLOBIN g/dL  --  12 3   I STAT HEMOGLOBIN g/dl 11 9  --    HEMATOCRIT % 35 36 7   PLATELETS Thousands/uL  --  289       Results from last 7 days  Lab Units 09/17/18  1231 09/17/18  1207   SODIUM mmol/L  --  137   POTASSIUM mmol/L  --  3 4*   CHLORIDE mmol/L  --  98*   CO2 mmol/L  --  31   BUN mg/dL  --  14   CREATININE mg/dL  --  0 86   CALCIUM mg/dL  --  9 6   GLUCOSE, ISTAT mg/dl 87  --        Results from last 7 days  Lab Units 09/17/18  1207   INR  1 08       Imaging: I have personally reviewed pertinent reports  X-ray Chest 1 View Portable    Result Date: 9/17/2018  Narrative: CHEST INDICATION:   Stroke alert  COMPARISON:  None EXAM PERFORMED/VIEWS:  XR CHEST PORTABLE FINDINGS:  Monitoring leads and clips project over the chest  Cardiac silhouette is borderline enlarged with calcified, uncoiled thoracic aorta  Hilar contours are within normal limits  The lungs are clear  No pneumothorax or pleural effusion  Osseous structures appear within normal limits for patient age  Impression: No acute cardiopulmonary disease  Workstation performed: [id number]     Ct Stroke Alert Brain    Result Date: 9/17/2018  Narrative: CT BRAIN - STROKE ALERT PROTOCOL INDICATION:   Slurred speech  COMPARISON:  June 30, 2018 TECHNIQUE:  CT examination of the brain was performed  In addition to axial images, coronal reformatted images were created and submitted for interpretation  Radiation dose length product (DLP) for this visit:  990 mGy-cm     This examination, like all CT scans performed in the [facility], was performed utilizing techniques to minimize radiation dose exposure, including the use of iterative reconstruction and automated exposure control  IMAGE QUALITY:  Diagnostic  FINDINGS:  PARENCHYMA:  No intracranial mass, mass effect or midline shift  No acute intracranial hemorrhage  No CT signs of acute infarction  Moderate to severe periventricular and white matter hypodensity seen related to chronic small vessel ischemic changes Atherosclerotic changes seen within the vertebrobasilar system, intracranial carotid arteries VENTRICLES AND EXTRA-AXIAL SPACES:  Normal for patient's age  VISUALIZED ORBITS AND PARANASAL SINUSES:  Unremarkable  CALVARIUM AND EXTRACRANIAL SOFT TISSUES:  There is a large right parotid mass, unchanged from the previous study measuring 5 2 x 4 2 cm     Impression: No acute indication hemorrhage seen No CT signs of acute territorial infarction Large right parotid mass, stable Extensive atherosclerotic changes seen within the vertebrobasilar system, carotid vessels Findings were directly discussed with Philippe Young on 9/17/2018 11:52 AM  Workstation performed: [id number]     Cta Stroke Alert (head/neck)    Result Date: 9/17/2018  Narrative: CTA NECK AND BRAIN WITH AND WITHOUT CONTRAST INDICATION: slurred speech/vision difficulty COMPARISON:   None  TECHNIQUE:  Routine CT imaging of the Brain without contrast   Post contrast imaging was performed after administration of iodinated contrast through the neck and brain  Post contrast axial 0 625 mm images timed to opacify the arterial system  3D rendering was performed on an independent workstation  MIP reconstructions performed  Coronal reconstructions were performed of the noncontrast portion of the brain  Radiation dose length product (DLP) for this visit:  295 mGy-cm     This examination, like all CT scans performed in the [facility], was performed utilizing techniques to minimize radiation dose exposure, including the use of iterative reconstruction and automated exposure control  IV Contrast:  85 mL of iohexol (OMNIPAQUE)  IMAGE QUALITY:   Diagnostic FINDINGS: NONCONTRAST BRAIN PARENCHYMA:  No intracranial mass, mass effect or midline shift  No acute intracranial hemorrhage  No CT signs of acute infarction  Moderate periventricular and white matter hypodensity seen related to chronic small vessel ischemic changes VENTRICLES AND EXTRA-AXIAL SPACES:  Normal for patient's age  VISUALIZED ORBITS AND PARANASAL SINUSES:  Unremarkable  CERVICAL VASCULATURE AORTIC ARCH AND GREAT VESSELS:  Normal aortic arch and great vessel origins  Normal visualized subclavian vessels  RIGHT VERTEBRAL ARTERY CERVICAL SEGMENT:  Normal origin  The vessel is normal in caliber throughout the neck  LEFT VERTEBRAL ARTERY CERVICAL SEGMENT:  Normal origin  The vessel is normal in caliber throughout the neck  RIGHT EXTRACRANIAL CAROTID SEGMENT:  Normal caliber common carotid artery  Normal bifurcation and cervical internal carotid artery  No stenosis or dissection  There is medialization of the right internal carotid artery which comes to lie in the posterior pharyngeal wall LEFT EXTRACRANIAL CAROTID SEGMENT:  Normal caliber common carotid artery  Normal bifurcation and cervical internal carotid artery  No stenosis or dissection  Is medialization of the left carotid artery which comes In the region of the posterior pharyngeal wall NASCET criteria was used to determine the degree of internal carotid artery diameter stenosis  INTRACRANIAL VASCULATURE INTERNAL CAROTID ARTERIES:  Normal enhancement of the intracranial portions of the internal carotid arteries  Normal ophthalmic artery origins  Normal ICA terminus  There is a extensive atherosclerotic calcific location seen in the cavernous carotids the supraclinoid portion ANTERIOR CIRCULATION:  Symmetric A1 segments and anterior cerebral arteries with normal enhancement    Normal anterior communicating artery  MIDDLE CEREBRAL ARTERY CIRCULATION:  M1 segment and middle cerebral artery branches demonstrate normal enhancement bilaterally  DISTAL VERTEBRAL ARTERIES:  Normal distal vertebral arteries  Posterior inferior cerebellar artery origins are normal  Normal vertebral basilar junction  BASILAR ARTERY:  Basilar artery is normal in caliber  Normal superior cerebellar arteries  Multifocal calcification seen within the basilar artery without significant stenosis 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 POSTERIOR CEREBRAL ARTERIES: Both posterior cerebral arteries arises from the basilar tip  Both arteries demonstrate normal enhancement  There is mild disease in the both posterior cerebral arteries Normal posterior communicating arteries  DURAL VENOUS SINUSES:  Normal  NON VASCULAR ANATOMY BONY STRUCTURES:  No acute osseous abnormality  Bridging ossification seen in the lower lower cervical and thoracic spine Mucosal thickening seen in the left sphenoid sinus SOFT TISSUES OF THE NECK: There is a right parotid mass which measures 4 8 x 4 9 cm A right thyroid nodule seen which measures about 5 mm Lymph node seen in the lower right paratracheal region THORACIC INLET:  Unremarkable  Impression: No large vessel occlusion seen in the intracranial circulation There is no evidence of carotid or vertebrobasilar dissection No evidence of  steno-occlusive disease in the carotid 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

## 2018-09-17 NOTE — ED PROVIDER NOTES
History  Chief Complaint   Patient presents with    CVA/TIA-like Symptoms     Family states"patient started feeling dizziness at around 930am" Family also stated patient stated"she couldnt see and had blurry vision along with weakness, slurred speech, and pain in her head and neck"  HPI  70-year-old white female with a chief complaint of blurry vision and some dizziness which started about 9:30 a m     Family states she had some slurred speech which has been improving since that time  Patient complained of some pressure across her forehead and states that has been easing up a bit at the present time  Patient is able to speak without slurring her speech  Patient has an obvious right facial mass that family states has been present for a very long time  Prior to Admission Medications   Prescriptions Last Dose Informant Patient Reported? Taking?    Ferrous Sulfate (IRON) 325 (65 Fe) MG TABS 9/17/2018 at Unknown time  Yes Yes   Sig: Take by mouth   Omega-3 Fatty Acids (FISH OIL) 1200 MG CAPS 9/17/2018 at Unknown time  Yes Yes   Sig: Take by mouth   Vitamin D, Cholecalciferol, 1000 units CAPS 9/17/2018 at Unknown time  Yes Yes   Sig: Take by mouth   atorvastatin (LIPITOR) 40 mg tablet 9/16/2018 at Unknown time  No Yes   Sig: Take 1 tablet (40 mg total) by mouth every evening   calcium carbonate (OS-MAURISIO) 600 MG tablet 9/17/2018 at Unknown time  Yes Yes   Sig: Take 600 mg by mouth daily   clopidogrel (PLAVIX) 75 mg tablet 9/16/2018 at Unknown time  Yes Yes   Sig: Take 75 mg by mouth daily at bedtime     furosemide (LASIX) 40 mg tablet 9/17/2018 at Unknown time  Yes Yes   Sig: Take 40 mg by mouth daily   gabapentin (NEURONTIN) 100 mg capsule 9/16/2018 at Unknown time  No Yes   Sig: Take 1 capsule (100 mg total) by mouth 2 (two) times a day   loratadine (CLARITIN) 10 mg tablet 9/17/2018 at Unknown time  Yes Yes   Sig: Take 10 mg by mouth daily   omega-3-acid ethyl esters (LOVAZA) 1 g capsule   Yes No   Sig: Take 1 g by mouth daily   pantoprazole (PROTONIX) 40 mg tablet 9/17/2018 at Unknown time  Yes Yes   Sig: Take 40 mg by mouth daily   vitamin E, tocopherol, ([employer] VITAMIN E) 1,000 units capsule 9/17/2018 at Unknown time  Yes Yes   Sig: Take 1,000 Units by mouth daily      Facility-Administered Medications: None       Past Medical History:   Diagnosis Date    Anemia     Cardiac disease     GERD (gastroesophageal reflux disease)     Hypertension     [facility]roke [facility])        Past Surgical History:   Procedure Laterality Date    APPENDECTOMY      CHOLECYSTECTOMY      FRACTURE SURGERY      HYSTERECTOMY         History reviewed  No pertinent family history  I have reviewed and agree with the history as documented  Social History   Substance Use Topics    Smoking status: Never Smoker    Smokeless tobacco: Never Used    Alcohol use No        Review of Systems   Constitutional: Negative for chills and fever  HENT: Negative for congestion and rhinorrhea  Eyes: Negative for discharge and visual disturbance  Respiratory: Negative for shortness of breath and wheezing  Cardiovascular: Negative for chest pain and palpitations  Gastrointestinal: Negative for abdominal pain and vomiting  Endocrine: Negative for polydipsia and polyuria  Genitourinary: Negative for dysuria and hematuria  Musculoskeletal: Negative for arthralgias, gait problem and neck stiffness  Skin: Negative for rash and wound  Neurological: Positive for dizziness, speech difficulty, weakness and light-headedness  Negative for headaches  Psychiatric/Behavioral: Negative for confusion and suicidal ideas  Physical Exam  Physical Exam   Constitutional: She is oriented to person, place, and time  She appears well-developed and well-nourished  31-year-old white female lying on the stretcher in no acute distress  Patient appears pale  Patient is able to speak without difficulty  HENT:   Head: Normocephalic     Mouth/Throat: Oropharynx is clear and moist    There is a large right 6 cm parietal mass to her right side of her face  Eyes: EOM are normal  Pupils are equal, round, and reactive to light  Positive lens implants bilaterally   Neck: Normal range of motion  Neck supple  Cardiovascular: Normal rate, regular rhythm and normal heart sounds  Pulmonary/Chest: Effort normal and breath sounds normal    Decreased breath sounds bilaterally   Abdominal: Soft  Bowel sounds are normal  There is no tenderness  There is no rebound and no guarding  Musculoskeletal: Normal range of motion  Neurological: She is alert and oriented to person, place, and time  No cranial nerve deficit  She exhibits normal muscle tone  Coordination normal    Skin: Skin is warm and dry  Psychiatric: She has a normal mood and affect  Nursing note and vitals reviewed        Vital Signs  ED Triage Vitals   Temperature Pulse Respirations Blood Pressure SpO2   09/17/18 1119 09/17/18 1119 09/17/18 1119 09/17/18 1119 09/17/18 1119   98 5 °F (36 9 °C) 88 18 122/62 97 %      Temp Source Heart Rate Source Patient Position - Orthostatic VS BP Location FiO2 (%)   09/17/18 1119 09/17/18 1119 09/17/18 1119 09/17/18 1119 --   Oral Monitor Lying Right arm       Pain Score       09/17/18 1137       2           Vitals:    09/17/18 1600 09/17/18 1630 09/17/18 1700 09/17/18 1730   BP: 111/66 105/66 126/66 104/57   Pulse: 83 84 87 87   Patient Position - Orthostatic VS:           Visual Acuity  Visual Acuity      Most Recent Value   L Pupil Size (mm)  2   R Pupil Size (mm)  2          ED Medications  Medications   cefTRIAXone (ROCEPHIN) IVPB (premix) 2,000 mg (0 mg Intravenous Stopped 9/17/18 1408)   sodium chloride 0 9 % infusion (125 mL/hr Intravenous New Bag 9/17/18 1338)   atorvastatin (LIPITOR) tablet 40 mg (not administered)   calcium carbonate oral suspension 600 mg (0 mg Oral Hold 9/17/18 1737)   clopidogrel (PLAVIX) tablet 75 mg (not administered)   ferrous sulfate tablet 325 mg (0 mg Oral Hold 9/17/18 1738)   furosemide (LASIX) tablet 40 mg (40 mg Oral Not Given 9/17/18 1510)   gabapentin (NEURONTIN) capsule 100 mg (not administered)   loratadine (CLARITIN) tablet 10 mg (10 mg Oral Not Given 9/17/18 1511)   fish oil capsule 1,000 mg (1,000 mg Oral Not Given 9/17/18 1510)   pantoprazole (PROTONIX) EC tablet 40 mg (40 mg Oral Not Given 9/17/18 1511)   cholecalciferol (VITAMIN D3) tablet 1,000 Units (1,000 Units Oral Not Given 9/17/18 1509)   heparin (porcine) subcutaneous injection 5,000 Units (0 Units Subcutaneous Hold 9/17/18 1739)   cefTRIAXone (ROCEPHIN) 1,000 mg in dextrose 5 % 50 mL IVPB (not administered)   iohexol (OMNIPAQUE) 350 MG/ML injection (MULTI-DOSE) 85 mL (85 mL Intravenous Given 9/17/18 1200)       Diagnostic Studies  Results Reviewed     Procedure Component Value Units Date/Time    Platelet count [[id number]]     Lab Status:  No result Specimen:  Blood     Urine culture [[id number]] Collected:  09/17/18 1418    Lab Status:   In process Specimen:  Urine from Urine, Clean Catch Updated:  09/17/18 1418    Urine Microscopic [[id number]]  (Abnormal) Collected:  09/17/18 1230    Lab Status:  Final result Specimen:  Urine from Urine, Clean Catch Updated:  09/17/18 1302     RBC, UA 1-2 (A) /hpf      WBC, UA 4-10 (A) /hpf      Epithelial Cells None Seen /hpf      Bacteria, UA Occasional /hpf     APTT [[id number]]  (Normal) Collected:  09/17/18 1207    Lab Status:  Final result Specimen:  Blood from Arm, Left Updated:  09/17/18 1243     PTT 33 seconds     Protime-INR [[id number]]  (Normal) Collected:  09/17/18 1207    Lab Status:  Final result Specimen:  Blood from Arm, Left Updated:  09/17/18 1243     Protime 13 9 seconds      INR 1 08    UA w Reflex to Microscopic w Reflex to Culture [[id number]]  (Abnormal) Collected:  09/17/18 1230    Lab Status:  Final result Specimen:  Urine from Urine, Clean Catch Updated:  09/17/18 1241     Color, UA Yellow     Clarity, UA Clear     Specific [place], UA 1 010     pH, UA 8 0     Leukocytes, UA Moderate (A)     Nitrite, UA Negative     Protein, UA Negative mg/dl      Glucose, UA Negative mg/dl      Ketones, UA Negative mg/dl      Urobilinogen, UA 0 2 E U /dl      Bilirubin, UA Negative     Blood, UA Negative    Basic metabolic panel [[id number]]  (Abnormal) Collected:  09/17/18 1207    Lab Status:  Final result Specimen:  Blood from Arm, Left Updated:  09/17/18 1241     Sodium 137 mmol/L      Potassium 3 4 (L) mmol/L      Chloride 98 (L) mmol/L      CO2 31 mmol/L      ANION GAP 8 mmol/L      BUN 14 mg/dL      Creatinine 0 86 mg/dL      Glucose 93 mg/dL      Calcium 9 6 mg/dL      eGFR 58 ml/min/1 73sq m     Narrative:         National Kidney Disease Education Program recommendations are as follows:  GFR calculation is accurate only with a steady state creatinine  Chronic Kidney disease less than 60 ml/min/1 73 sq  meters  Kidney failure less than 15 ml/min/1 73 sq  meters      POCT Chem 8+ [[id number]]  (Abnormal) Collected:  09/17/18 1231    Lab Status:  Final result Updated:  09/17/18 1236     SODIUM, I-STAT 136 mmol/l      Potassium, i-STAT 3 6 mmol/L      Chloride, istat 93 (L) mmol/L      CO2, i-STAT 32 mmol/L      Anion Gap, Istat 16 (H) mmol/L      Calcium, Ionized i-STAT 1 18 mmol/L      BUN, I-STAT 16 mg/dl      Creatinine, i-STAT 0 8 mg/dl      eGFR 63 ml/min/1 73sq m      Glucose, i-STAT 87 mg/dl      Hct, i-STAT 35 %      Hgb, i-STAT 11 9 g/dl      Specimen Type VENOUS    CBC [[id number]]  (Normal) Collected:  09/17/18 1207    Lab Status:  Final result Specimen:  Blood from Arm, Left Updated:  09/17/18 1221     WBC 7 27 Thousand/uL      RBC 3 98 Million/uL      Hemoglobin 12 3 g/dL      Hematocrit 36 7 %      MCV 92 fL      MCH 30 9 pg      MCHC 33 5 g/dL      RDW 14 6 %      Platelets 760 Thousands/uL      MPV 10 2 fL     Fingerstick Glucose (POCT) [[id number]]  (Normal) Collected:  09/17/18 1142    Lab Status:  Final result Updated:  09/17/18 1143     POC Glucose 111 mg/dl                  X-ray chest 1 view portable   Final Result by Lorena Hernández DO (09/17 0094)      No acute cardiopulmonary disease  Workstation performed: [id number]         CTA stroke alert (head/neck)   Final Result by Corrie Uribe MD (09/17 3017)      No large vessel occlusion seen in the intracranial circulation   There is no evidence of carotid or vertebrobasilar dissection   No evidence of  steno-occlusive disease in the carotid    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   Mild disease in the bilateral posterior cerebral arteries   Medialized both internal carotid arteries          I personally discussed this study with Annamarie Moscoso on 9/17/2018 at 12:17 PM                             Workstation performed: [id number]         CT stroke alert brain   Final Result by Corrie Uribe MD (09/17 5062)      No acute indication hemorrhage seen   No CT signs of acute territorial infarction   Large right parotid mass, stable   Extensive atherosclerotic changes seen within the vertebrobasilar system, carotid vessels   Findings were directly discussed with Annamarie Moscoso on 9/17/2018 11:52 AM       Workstation performed: [id number]         MRI Inpatient Order    (Results Pending)              Procedures  Procedures       Phone Contacts  ED Phone Contact    ED Course       11:47 AM:  Spoke with Dr Mimi Felton - here to see pt  - states to cancel the Stroke Alert    Identification of Seniors at Risk      Most Recent Value   (ISAR) Identification of Seniors at Risk   Before the illness or injury that brought you to the Emergency, did you need someone to help you on a regular basis? 0 Filed at: 09/17/2018 1122   In the last 24 hours, have you needed more help than usual?  0 Filed at: 09/17/2018 1122   Have you been hospitalized for one or more nights during the past 6 months? 1 Filed at: 09/17/2018 1122   In general, do you see well? 1 Filed at: 09/17/2018 1122   In general, do you have serious problems with your memory? 1 Filed at: 09/17/2018 1122   Do you take more than three different medications every day? 1 Filed at: 09/17/2018 1122   ISAR Score  4 Filed at: 09/17/2018 1122          1:15 PM:  Call to [address] [place] [address] with Dr Sun Cuba - will admit pt  I discussed the lab results as well as the CT results with the patient and her son  Patient's son is power of  and would like patient watched overnight  Patient has a moderate amount of leukocytes in her urine  I treated patient some IV Rocephin and IV hydration  We will admit patient for observation and hopefully patient will be discharged tomorrow  MDM  CritCare Time     Differential diagnosis includes:  1  TIA  2  Slurred speech  3   Weakness  4  UTI  5  R parietal mass      Disposition  Final diagnoses:   Slurred speech   TIA (transient ischemic attack)   UTI (urinary tract infection)     Time reflects when diagnosis was documented in both MDM as applicable and the Disposition within this note     Time User Action Codes Description Comment    9/17/2018 11:47 AM Kody Marc Add [R47 81] Slurred speech     9/17/2018  1:34 PM Kody Marc Add [G45 9] TIA (transient ischemic attack)     9/17/2018  1:34 PM Kody Marc Add [N39 0] UTI (urinary tract infection)     9/17/2018  2:06 PM [facility], [address] [place] [address] [G45 9] TIA (transient ischemic attack)     9/17/2018  2:08 PM [facility], [address] [place] [address] [G45 9] TIA (transient ischemic attack)     9/17/2018  2:23 PM Yassine Ochoa Open wound of right forearm, initial encounter       ED Disposition     ED Disposition Condition Comment    Admit  Case was discussed with Dr Fifi Easton and the patient's admission status was agreed to be observation status to the service of Dr Fifi Easton           Follow-up Information    None         Patient's Medications   Discharge Prescriptions    No medications on file     No discharge procedures on file      ED Provider  Electronically Signed by           Yue Mahoney DO  09/17/18 0789

## 2018-09-18 LAB
ANION GAP SERPL CALCULATED.3IONS-SCNC: 8 MMOL/L (ref 4–13)
ATRIAL RATE: 92 BPM
BACTERIA UR CULT: NORMAL
BUN SERPL-MCNC: 14 MG/DL (ref 5–25)
CALCIUM SERPL-MCNC: 8.9 MG/DL (ref 8.3–10.1)
CHLORIDE SERPL-SCNC: 104 MMOL/L (ref 100–108)
CHOLEST SERPL-MCNC: 119 MG/DL (ref 50–200)
CO2 SERPL-SCNC: 29 MMOL/L (ref 21–32)
CREAT SERPL-MCNC: 0.83 MG/DL (ref 0.6–1.3)
ERYTHROCYTE [DISTWIDTH] IN BLOOD BY AUTOMATED COUNT: 14.8 % (ref 11.6–15.1)
EST. AVERAGE GLUCOSE BLD GHB EST-MCNC: 123 MG/DL
GFR SERPL CREATININE-BSD FRML MDRD: 60 ML/MIN/1.73SQ M
GLUCOSE SERPL-MCNC: 80 MG/DL (ref 65–140)
HBA1C MFR BLD: 5.9 % (ref 4.2–6.3)
HCT VFR BLD AUTO: 32.1 % (ref 34.8–46.1)
HDLC SERPL-MCNC: 67 MG/DL (ref 40–60)
HGB BLD-MCNC: 10.6 G/DL (ref 11.5–15.4)
LDLC SERPL CALC-MCNC: 37 MG/DL (ref 0–100)
MAGNESIUM SERPL-MCNC: 2.4 MG/DL (ref 1.6–2.6)
MCH RBC QN AUTO: 30.9 PG (ref 26.8–34.3)
MCHC RBC AUTO-ENTMCNC: 33 G/DL (ref 31.4–37.4)
MCV RBC AUTO: 94 FL (ref 82–98)
P AXIS: 52 DEGREES
PLATELET # BLD AUTO: 255 THOUSANDS/UL (ref 149–390)
PMV BLD AUTO: 10.1 FL (ref 8.9–12.7)
POTASSIUM SERPL-SCNC: 3 MMOL/L (ref 3.5–5.3)
PR INTERVAL: 194 MS
PROCALCITONIN SERPL-MCNC: <0.05 NG/ML
QRS AXIS: -34 DEGREES
QRSD INTERVAL: 92 MS
QT INTERVAL: 376 MS
QTC INTERVAL: 464 MS
RBC # BLD AUTO: 3.43 MILLION/UL (ref 3.81–5.12)
SODIUM SERPL-SCNC: 141 MMOL/L (ref 136–145)
T WAVE AXIS: 33 DEGREES
TRIGL SERPL-MCNC: 76 MG/DL
VENTRICULAR RATE: 92 BPM
WBC # BLD AUTO: 6.13 THOUSAND/UL (ref 4.31–10.16)

## 2018-09-18 PROCEDURE — G8996 SWALLOW CURRENT STATUS: HCPCS

## 2018-09-18 PROCEDURE — 84145 PROCALCITONIN (PCT): CPT | Performed by: GENERAL PRACTICE

## 2018-09-18 PROCEDURE — G8978 MOBILITY CURRENT STATUS: HCPCS

## 2018-09-18 PROCEDURE — 80061 LIPID PANEL: CPT | Performed by: INTERNAL MEDICINE

## 2018-09-18 PROCEDURE — G8998 SWALLOW D/C STATUS: HCPCS

## 2018-09-18 PROCEDURE — 99232 SBSQ HOSP IP/OBS MODERATE 35: CPT | Performed by: GENERAL PRACTICE

## 2018-09-18 PROCEDURE — 83036 HEMOGLOBIN GLYCOSYLATED A1C: CPT | Performed by: INTERNAL MEDICINE

## 2018-09-18 PROCEDURE — 97163 PT EVAL HIGH COMPLEX 45 MIN: CPT

## 2018-09-18 PROCEDURE — G8997 SWALLOW GOAL STATUS: HCPCS

## 2018-09-18 PROCEDURE — 85027 COMPLETE CBC AUTOMATED: CPT | Performed by: INTERNAL MEDICINE

## 2018-09-18 PROCEDURE — 92610 EVALUATE SWALLOWING FUNCTION: CPT

## 2018-09-18 PROCEDURE — 80048 BASIC METABOLIC PNL TOTAL CA: CPT | Performed by: INTERNAL MEDICINE

## 2018-09-18 PROCEDURE — 93010 ELECTROCARDIOGRAM REPORT: CPT | Performed by: INTERNAL MEDICINE

## 2018-09-18 PROCEDURE — G8979 MOBILITY GOAL STATUS: HCPCS

## 2018-09-18 RX ORDER — POTASSIUM CHLORIDE 20 MEQ/1
40 TABLET, EXTENDED RELEASE ORAL 2 TIMES DAILY
Status: COMPLETED | OUTPATIENT
Start: 2018-09-18 | End: 2018-09-18

## 2018-09-18 RX ADMIN — GABAPENTIN 100 MG: 100 CAPSULE ORAL at 17:57

## 2018-09-18 RX ADMIN — POTASSIUM CHLORIDE 40 MEQ: 1500 TABLET, EXTENDED RELEASE ORAL at 17:57

## 2018-09-18 RX ADMIN — LORATADINE 10 MG: 10 TABLET ORAL at 10:40

## 2018-09-18 RX ADMIN — HEPARIN SODIUM 5000 UNITS: 5000 INJECTION, SOLUTION INTRAVENOUS; SUBCUTANEOUS at 06:44

## 2018-09-18 RX ADMIN — Medication 1000 MG: at 10:41

## 2018-09-18 RX ADMIN — FUROSEMIDE 40 MG: 40 TABLET ORAL at 10:40

## 2018-09-18 RX ADMIN — VITAMIN D, TAB 1000IU (100/BT) 1000 UNITS: 25 TAB at 10:40

## 2018-09-18 RX ADMIN — HEPARIN SODIUM 5000 UNITS: 5000 INJECTION, SOLUTION INTRAVENOUS; SUBCUTANEOUS at 13:42

## 2018-09-18 RX ADMIN — FERROUS SULFATE TAB 325 MG (65 MG ELEMENTAL FE) 325 MG: 325 (65 FE) TAB at 22:35

## 2018-09-18 RX ADMIN — GABAPENTIN 100 MG: 100 CAPSULE ORAL at 10:41

## 2018-09-18 RX ADMIN — CALCIUM CARBONATE 600 MG: 1250 SUSPENSION ORAL at 22:35

## 2018-09-18 RX ADMIN — CLOPIDOGREL BISULFATE 75 MG: 75 TABLET ORAL at 22:35

## 2018-09-18 RX ADMIN — HEPARIN SODIUM 5000 UNITS: 5000 INJECTION, SOLUTION INTRAVENOUS; SUBCUTANEOUS at 22:35

## 2018-09-18 RX ADMIN — PANTOPRAZOLE SODIUM 40 MG: 40 TABLET, DELAYED RELEASE ORAL at 10:40

## 2018-09-18 RX ADMIN — ATORVASTATIN CALCIUM 40 MG: 40 TABLET, FILM COATED ORAL at 17:57

## 2018-09-18 RX ADMIN — POTASSIUM CHLORIDE 40 MEQ: 1500 TABLET, EXTENDED RELEASE ORAL at 10:41

## 2018-09-18 RX ADMIN — NITROGLYCERIN 0.2 MG: 0.2 PATCH TRANSDERMAL at 10:41

## 2018-09-18 RX ADMIN — CEFTRIAXONE 1000 MG: 1 INJECTION, POWDER, FOR SOLUTION INTRAMUSCULAR; INTRAVENOUS at 13:38

## 2018-09-18 NOTE — PHYSICAL THERAPY NOTE
Physical Therapy Evaluation     Patient's Name: Kimmy Christy    Admitting Diagnosis  TIA (transient ischemic attack) [G45 9]  Dizziness [R42]  Slurred speech [R47 81]  UTI (urinary tract infection) [N39 0]  Open wound of right forearm, initial encounter [S51 801A]    Problem List  Patient Active Problem List   Diagnosis    TIA (transient ischemic attack)    Mass of parotid gland    Dementia without behavioral disturbance    Acute encephalopathy    Acute cystitis without hematuria    Dyslipidemia    Gastroesophageal reflux disease without esophagitis    Bilateral hearing loss    Lower leg edema       Past Medical History  Past Medical History:   Diagnosis Date    Anemia     Cardiac disease     GERD (gastroesophageal reflux disease)     Hypertension     Stroke [facility])        Past Surgical History  Past Surgical History:   Procedure Laterality Date    APPENDECTOMY      CHOLECYSTECTOMY      FRACTURE SURGERY      HYSTERECTOMY            09/18/18 1312   Note Type   Note type Eval only   Pain Assessment   Pain Assessment No/denies pain   Pain Score No Pain   Home Living   Type of [address] [place] [address] Two level; Able to live on main level with bedroom/bathroom; Performs ADLs on one level;Stairs to enter with rails  (1st floor setup, 3 JONY via porch with B handrails)   Bathroom Shower/Tub Tub/shower unit  (Sponge bathes at sink)   [employer] Other (Comment)  (no DME at baseline)   [address] [place];Life alert   Prior Function   Level of [place] Independent with ADLs and functional mobility   Lives With Alone   Receives Help From Family   ADL Assistance Independent   IADLs Independent   Falls in the last 6 months 1 to 4   Vocational Retired   Comments Patient reports she ambulates independently with RW in the home  Family provides A with transportation   Patient lives alone, however, her son stays with her every night per patient/family report  Restrictions/Precautions   Weight Bearing Precautions Per Order No   Braces or Orthoses Other (Comment)  (none per patient/family)   Other Precautions Fall Risk;Telemetry;Hard of hearing   General   Family/Caregiver Present Yes   Cognition   Overall Cognitive Status WFL   Arousal/Participation Cooperative   Orientation Level Oriented X4   Memory Within functional limits   Following Commands Follows all commands and directions without difficulty   Comments Patient agreeable to PT treatment   RUE Assessment   RUE Assessment WFL   RUE Strength   RUE Overall Strength Within Functional Limits - able to perform ADL tasks with strength   LUE Assessment   LUE Assessment WFL   LUE Strength   LUE Overall Strength Within Functional Limits - able to perform ADL tasks with strength   RLE Assessment   RLE Assessment WFL   Strength RLE   RLE Overall Strength 4/5   LLE Assessment   LLE Assessment WFL   Strength LLE   LLE Overall Strength 4/5   Coordination   Movements are Fluid and Coordinated 1   Sensation WFL   Light Touch   RLE Light Touch Grossly intact   LLE Light Touch Grossly intact   Bed Mobility   Additional Comments Patient received seated in recliner chair upon arrival   Transfers   Sit to Stand 4  Minimal assistance  (CGA)   Additional items Assist x 1; Armrests; Increased time required;Verbal cues   Stand to Sit 4  Minimal assistance  (CGA)   Additional items Assist x 1; Increased time required;Verbal cues;Armrests   Ambulation/Elevation   Gait pattern Short stride; Improper Weight shift;Decreased foot clearance; Forward Flexion   Gait Assistance 4  Minimal assist  (CGA)   Additional items Assist x 1;Verbal cues   Assistive Device Rolling walker   Distance 20 ft   Stair Management Assistance Not tested   Balance   Static Sitting Good   Dynamic Sitting Fair +   Static Standing Fair   Dynamic Standing Fair -   Ambulatory Poor +   Endurance Deficit   Endurance Deficit No   Activity Tolerance Activity Tolerance Patient tolerated treatment well   Nurse Made Aware yes, Don Jonas, RN verbalized patient appropriate for PT evaluation, made aware of session outcomes   Assessment   Prognosis Good   Problem List Impaired balance;Decreased mobility; Impaired hearing;Decreased skin integrity   Assessment Pt is 80 y o  female seen for PT evaluation s/p admit to [facility] on 9/17/2018 w/ Acute encephalopathy  PT consulted to assess pt's functional mobility and d/c needs  Order placed for PT eval and tx, w/ up w/ A order  Performed at least 2 patient identifiers during session: Name and wristband  Comorbidities affecting pt's physical performance at time of assessment include: anemia, cardiac disease, GERD, HTN, CVA  PTA, pt was independent w/ all functional mobility w/ RW, ambulates household distances, has 3 JONY, lives alone (son is able to stay with patient overnight) in 2 level home (1st floor setup) and retired  Personal factors affecting pt at time of IE include: inaccessible home environment, inability to navigate level surfaces w/o external assistance, limited home support, positive fall history, hearing impairments and inability to perform ADLs  Please find objective findings from PT assessment regarding body systems outlined above with impairments and limitations including impaired balance, gait deviations, fall risk, decreased skin integrity as well as mobility assessment (need for CGA w/ all phases of mobility when usually ambulating independently with RW and need for cueing for mobility technique)  The following objective measures performed on IE also reveal limitations: Barthel Index: 55/100 and Modified [place]: 4 (moderate/severe disability)  Pt's clinical presentation is currently unstable/unpredictable evident in need for input for mobility technique/safety, pending further neuro/medical workup including MRI, on telemetry monitoring   Pt to benefit from continued PT tx to address deficits as defined above and maximize level of functional independent mobility and consistency  From PT/mobility standpoint, recommendation at time of d/c would be Home PT with family support pending progress in order to facilitate return to PLOF  Barriers to Discharge Decreased caregiver support   Goals   Patient Goals To go home   STG Expiration Date 09/28/18   Short Term Goal #1 In 7-10 days: Perform all bed mobility tasks modified independent to decrease caregiver burden, Perform all transfers modified independent to improve independence, Ambulate > 50 ft  X 2 with RW modified independent w/o LOB and w/ normalized gait pattern 100% of the time, Navigate 3 stairs with supervision with bilateral handrails to facilitate return to previous living environment, Increase all balance 1 grade to decrease risk for falls, Complete exercise program independently, Complete % of the time and PT provider will perform functional balance assessment to determine fall risk   Treatment Day 0   Plan   Treatment/Interventions ADL retraining;Functional transfer training;LE strengthening/ROM; Elevations; Therapeutic exercise; Endurance training;Patient/family training;Equipment eval/education; Bed mobility;Gait training;Spoke to nursing;Family   PT Frequency 5x/wk   Recommendation   Recommendation Home PT; Home with family support   Equipment Recommended Other (Comment)  (Patient has personal walker)   PT - OK to Discharge No   Additional Comments Patient to demonstrate consistency with level surface ambulation and trial stairs prior to d/c   Modified [place] Scale   Modified [place] Scale 4   Barthel Index   Feeding 10   Bathing 0   Grooming Score 5   Dressing Score 5   Bladder Score 10   Bowels Score 10   Toilet Use Score 5   Transfers (Bed/Chair) Score 10   Mobility (Level Surface) Score 0   Stairs Score 0   Barthel Index Score 55       Dana Rojas, PT, DPT

## 2018-09-18 NOTE — CASE MANAGEMENT
Initial Clinical Review    Admission: Date/Time/Statement: 9/17/18 @ 1420   Start   Ordered   09/17/18 1420  Inpatient Admission Once     Transfer Service: General Medicine    Expected Discharge Date: 09/20/18       Question Answer Comment   Admitting Physician [facility]    Level of Care Med Surg    Estimated length of stay More than 2 Midnights    Certification I certify that inpatient services are medically necessary for this patient for a duration of greater than two midnights  See H&P and MD Progress Notes for additional information about the patient's course of treatment  ED: Date/Time/Mode of Arrival:   ED Arrival Information     Expected Arrival Acuity Means of Arrival Escorted By Service Admission Type    - 9/17/2018 10:52 Emergent Walk-In Family Member General Medicine Emergency    Arrival Complaint    Dizziness          Chief Complaint:   Chief Complaint   Patient presents with    CVA/TIA-like Symptoms     Family states"patient started feeling dizziness at around 930am" Family also stated patient stated"she couldnt see and had blurry vision along with weakness, slurred speech, and pain in her head and neck"  History of Illness: Babs Gentleman is a 80 y o  female who presents with change in mental status  Patient is pretty hard of hearing  Her son is at bedside  Her son was working in the lawn and after he finished, he went check on her when he found her that she was asking that she needed help  She could not see and also she was not stable with her walker  Her speech was off  Called EMS and was brought into the hospital   Her symptoms are actually all gone now  Patient also was complaining of some pain in her neck and head when this happened        Patient also urinate quite frequently as she is on water pills given the fact that her legs swell up if she is off the diuretics for little while      She also fell and had skin laceration of her right forearm    Son has been changing the dressing  She was seen by primary care physician for that as well      ED Vital Signs:   ED Triage Vitals   Temperature Pulse Respirations Blood Pressure SpO2   09/17/18 1119 09/17/18 1119 09/17/18 1119 09/17/18 1119 09/17/18 1119   98 5 °F (36 9 °C) 88 18 122/62 97 %      Temp Source Heart Rate Source Patient Position - Orthostatic VS BP Location FiO2 (%)   09/17/18 1119 09/17/18 1119 09/17/18 1119 09/17/18 1119 --   Oral Monitor Lying Right arm       Pain Score       09/17/18 1137       2        Wt Readings from Last 1 Encounters:   09/18/18 58 1 kg (128 lb)       Vital Signs (abnormal):    above    Abnormal Labs/Diagnostic Test Results:    K  3 4  Chloride   98  CXR:  NAD  Ct  Head/neck:  No large vessel occlusion seen in the intracranial circulation  There is no evidence of carotid or vertebrobasilar dissection  No evidence of  steno-occlusive disease in the carotid   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  Mild disease in the bilateral posterior cerebral arteries  Medialized both internal carotid arteries    ED Treatment:   Medication Administration from 09/17/2018 1052 to 09/17/2018 2123       Date/Time Order Dose Route Action Action by Comments     09/17/2018 1200 iohexol (OMNIPAQUE) 350 MG/ML injection (MULTI-DOSE) 85 mL 85 mL Intravenous Given Scott Bangura      09/17/2018 1408 cefTRIAXone (ROCEPHIN) IVPB (premix) 2,000 mg 0 mg Intravenous Stopped Paulo Gillespie RN      09/17/2018 1338 cefTRIAXone (ROCEPHIN) IVPB (premix) 2,000 mg 2,000 mg Intravenous New Bag Genita Scheuermann Rogers, RN      09/17/2018 1338 sodium chloride 0 9 % infusion 125 mL/hr Intravenous New P O  Nithya Jeffries RN      09/17/2018 2024 atorvastatin (LIPITOR) tablet 40 mg 40 mg Oral Given Yazan Henriquez RN      09/17/2018 1737 calcium carbonate oral suspension 600 mg 0 mg Oral Hold Paulo Gillespie RN Pt takes this medication     09/17/2018 1738 ferrous sulfate tablet 325 mg 0 mg Oral Hold Mohsen Kelly RN Pt takes this at bedtime     09/17/2018 1510 furosemide (LASIX) tablet 40 mg 40 mg Oral Not Given Mohsen Kelly, RN Pt took this already     09/17/2018 1938 gabapentin (NEURONTIN) capsule 100 mg 100 mg Oral Given Ulysses Romo RN      09/17/2018 1511 loratadine (CLARITIN) tablet 10 mg 10 mg Oral Not Given Mohsen Kelly, RN Pt took this already     09/17/2018 1510 fish oil capsule 1,000 mg 1,000 mg Oral Not Given Mohsen Kelly, RN Pt took this already     09/17/2018 1511 pantoprazole (PROTONIX) EC tablet 40 mg 40 mg Oral Not Given Mohsen Kelly, RN Pt took this already     09/17/2018 1509 cholecalciferol (VITAMIN D3) tablet 1,000 Units 1,000 Units Oral Not Given Mohsen Kelly, RN Pt took it today already     09/17/2018 1739 heparin (porcine) subcutaneous injection 5,000 Units 0 Units Subcutaneous Hold Mohsen Kelly, RN Pt takes this at bedtime          Past Medical/Surgical History: Active Ambulatory Problems     Diagnosis Date Noted    TIA (transient ischemic attack) 06/30/2018    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    Dementia without behavioral disturbance 06/30/2018     Resolved Ambulatory Problems     Diagnosis Date Noted    Transient confusion      Past Medical History:   Diagnosis Date    Anemia     Cardiac disease     GERD (gastroesophageal reflux disease)     Hypertension     Stroke [facility])        Admitting Diagnosis: TIA (transient ischemic attack) [G45 9]  Dizziness [R42]  Slurred speech [R47 81]  UTI (urinary tract infection) [N39 0]  Open wound of right forearm, initial encounter [S56 801A]    Age/Sex: 80 y o  female    · Assessment/Plan: Acute encephalopathy  Exact etiology not clear  Patient has only 4-10 WBCs in the urine and positive for leukocyte Estrace  Symptoms could be secondary to UTI-present on admission  She also has history of TI A  Initial CTA unremarkable as she was a stroke alert    Would ask for an MRI of her brain and a carotid ultrasound  Discussed with patient's son  No other aggressive treatment  Continue with Plavix  Neurology consult pending  PT/O T  May need short-term rehab  Her mental status is very much back to baseline  ? Need for echocardiogram/EEG  ? Dyslipidemia  Continue with statin  ? Bilateral lower extremities edema-chronic  Patient is on diuretic chronically  She recently had an echocardiogram which showed normal ejection fraction  ? Chronic parotid gland mass  No further recommendations or treatment  ? Hearing loss-quite significant  She wears hearing aid and it does not help much either  ? UTI-present on admission  On antibiotics and follow up final cultures  I have requested urine cultures to be sent as this may not happen as patient's urinalysis only has 4-10 WBCs  Wound-right forearm  Patient had her skin ripped off couple of weeks or so ago  Would ask for wound care consultation  Currently she has dressing on  Anticipated Length of Stay:  Patient will be admitted on an Inpatient basis with an anticipated length of stay of  more than 2 midnights     Justification for Hospital Stay:  UTI/acute encephalopathy-follow up final cultures which would take at least couple of nights-IV antibiotics    Admission Orders:       9/17  @    1420  Scheduled Meds:   Current Facility-Administered Medications:  atorvastatin 40 mg Oral QPM Phyllis Monique MD    calcium carbonate 600 mg Oral Daily Phyllis Monique MD    cefTRIAXone 1,000 mg Intravenous Q24H Phyllis Monique MD Last Rate: 1,000 mg (09/18/18 1338)   cholecalciferol 1,000 Units Oral Daily Phyllis Monique MD    clopidogrel 75 mg Oral HS Phyllis Monique MD    ferrous sulfate 325 mg Oral Daily Phyllis Monique MD    fish oil 1,000 mg Oral Daily Phyllis Monique MD    furosemide 40 mg Oral Daily Phyllis Monique MD    gabapentin 100 mg Oral BID Phyllis Monique MD    heparin (porcine) 5,000 Units Subcutaneous [facility] Phyllis Monique MD loratadine 10 mg Oral Daily Cathi Steinberg MD    nitroglycerin 0 2 mg Transdermal Daily MARTIN CarterC    pantoprazole 40 mg Oral Daily Cathi Steinberg MD    potassium chloride 40 mEq Oral BID Yodit Baires MD      Continuous Infusions:    PRN Meds:     Neuro  Checks  Q 15 min initially, then  Q 1 hr  X 4,  q 2 hrs  X4   Then Q 4 hrs  X  72  hrs  Dysphagia  eval  O2  2L  Cons neuro  Tele  Urine  C/s  Stroke teaching  PT/OT/speech  MRI   Brain  Carotid artery eval  Cons wound care

## 2018-09-18 NOTE — PLAN OF CARE
Problem: Nutrition/Hydration-ADULT  Goal: Nutrient/Hydration intake appropriate for improving, restoring or maintaining nutritional needs  Monitor and assess patient's nutrition/hydration status for malnutrition (ex- brittle hair, bruises, dry skin, pale skin and conjunctiva, muscle wasting, smooth red tongue, and disorientation)  Collaborate with interdisciplinary team and initiate plan and interventions as ordered  Monitor patient's weight and dietary intake as ordered or per policy  Utilize nutrition screening tool and intervene per policy  Determine patient's food preferences and provide high-protein, high-caloric foods as appropriate  INTERVENTIONS:  - Monitor oral intake, urinary output, labs, and treatment plans  - Assess nutrition and hydration status and recommend course of action  - Evaluate amount of meals eaten  - Assist patient with eating if necessary   - Allow adequate time for meals  - Recommend/ encourage appropriate diets, oral nutritional supplements, and vitamin/mineral supplements  - Order, calculate, and assess calorie counts as needed  - Recommend, monitor, and adjust tube feedings and TPN/PPN based on assessed needs  - Assess need for intravenous fluids  - Provide specific nutrition/hydration education as appropriate  - Include patient/family/caregiver in decisions related to nutrition  Outcome: Progressing  Goal: Pt will consume at least 75% of meals  Recommend downgrade diet texture to Dysphagia 3 as pt is edentulous and reports difficulty chewing  Pt is to be evaluated by SLP

## 2018-09-18 NOTE — PLAN OF CARE
Problem: PHYSICAL THERAPY ADULT  Goal: Performs mobility at highest level of function for planned discharge setting  See evaluation for individualized goals  Treatment/Interventions: ADL retraining, Functional transfer training, LE strengthening/ROM, Elevations, Therapeutic exercise, Endurance training, Patient/family training, Equipment eval/education, Bed mobility, Gait training, Spoke to nursing, Family  Equipment Recommended: Other (Comment) (Patient has personal walker)       See flowsheet documentation for full assessment, interventions and recommendations  Prognosis: Good  Problem List: Impaired balance, Decreased mobility, Impaired hearing, Decreased skin integrity  Assessment: Pt is 80 y o  female seen for PT evaluation s/p admit to [facility] on 9/17/2018 w/ Acute encephalopathy  PT consulted to assess pt's functional mobility and d/c needs  Order placed for PT eval and tx, w/ up w/ A order  Performed at least 2 patient identifiers during session: Name and wristband  Comorbidities affecting pt's physical performance at time of assessment include: anemia, cardiac disease, GERD, HTN, CVA  PTA, pt was independent w/ all functional mobility w/ RW, ambulates household distances, has 3 JONY, lives alone (son is able to stay with patient overnight) in 2 level home (1st floor setup) and retired  Personal factors affecting pt at time of IE include: inaccessible home environment, inability to navigate level surfaces w/o external assistance, limited home support, positive fall history, hearing impairments and inability to perform ADLs  Please find objective findings from PT assessment regarding body systems outlined above with impairments and limitations including impaired balance, gait deviations, fall risk, decreased skin integrity as well as mobility assessment (need for CGA w/ all phases of mobility when usually ambulating independently with RW and need for cueing for mobility technique)   The following objective measures performed on IE also reveal limitations: Barthel Index: 55/100 and Modified Blanca: 4 (moderate/severe disability)  Pt's clinical presentation is currently unstable/unpredictable evident in need for input for mobility technique/safety, pending further neuro/medical workup including MRI, on telemetry monitoring  Pt to benefit from continued PT tx to address deficits as defined above and maximize level of functional independent mobility and consistency  From PT/mobility standpoint, recommendation at time of d/c would be Home PT with family support pending progress in order to facilitate return to PLOF  Barriers to Discharge: Decreased caregiver support     Recommendation: Home PT, Home with family support     PT - OK to Discharge: (S) No    See flowsheet documentation for full assessment

## 2018-09-18 NOTE — PROGRESS NOTES
[facility] Internal Medicine Progress Note  Patient:  Ny 80 y o  female   MRN: [id number]  PCP: Ramesh Nugent DO  Unit/Bed#: [facility] Encounter: [id number]  Date Of Visit: 09/18/18    Assessment:    Principal Problem:    Acute encephalopathy  Active Problems:    TIA (transient ischemic attack)    Mass of parotid gland    Dementia without behavioral disturbance    Acute cystitis without hematuria    Dyslipidemia    Gastroesophageal reflux disease without esophagitis    Bilateral hearing loss    Lower leg edema      Plan:    · Acute encephalopathy  Now back to baseline  Patient has only 4-10 WBCs in the urine and positive for leukocyte Estrace  Symptoms could be secondary to UTI-present on admission-UC pending  She also has history of TI A  Initial CTA unremarkable as she was a stroke alert  MRI of her brain had cta head and neck  Discussed with patient's son  No other aggressive treatment  Continue with Plavix  Neurology consult pending  PT/O T  May need short-term rehab   ? Need for echocardiogram/EEG  as just had these 7/18  ? Dyslipidemia  Continue with statin flp reviewed  ? Bilateral lower extremities edema-chronic  Patient is on diuretic chronically  She recently had an echocardiogram which showed normal ejection fraction  ? Chronic parotid gland mass  No further recommendations or treatment  ? Hearing loss-quite significant  She wears hearing aid and it does not help much either  ? UTI-present on admission  UC pending on rocephin  ? Wound-right forearm  Patient had her skin ripped off couple of weeks or so ago  wound care consultation  Currently she has dressing check procalcitonin;      Replace k; check mg      VTE Pharmacologic Prophylaxis:   Pharmacologic: Heparin  Mechanical VTE Prophylaxis in Place: Yes    Patient Centered Rounds: I have performed bedside rounds with nursing staff today      Discussions with Specialists or Other Care Team Provider:     Education and Discussions with Family / Patient:     Time Spent for Care: 30 minutes  More than 50% of total time spent on counseling and coordination of care as described above  Current Length of Stay: 1 day(s)    Current Patient Status: Inpatient   Certification Statement: The patient will continue to require additional inpatient hospital stay due to altered MS    Discharge Plan: pending    Code Status: Level 3 - DNAR and DNI      Subjective:   Oriented to person    Objective:     Vitals:   Temp (24hrs), Av °F (36 7 °C), Min:97 7 °F (36 5 °C), Max:98 5 °F (36 9 °C)    HR:  [60-91] 60  Resp:  [12-33] 18  BP: (104-152)/(51-83) 118/57  SpO2:  [92 %-97 %] 92 %  Body mass index is 25 94 kg/m²  Input and Output Summary (last 24 hours): Intake/Output Summary (Last 24 hours) at 18 0932  Last data filed at 18 1408   Gross per 24 hour   Intake               50 ml   Output                0 ml   Net               50 ml       Physical Exam:     Physical Exam   Constitutional: She appears well-developed and well-nourished  HENT:   Head: Normocephalic and atraumatic  Eyes: Pupils are equal, round, and reactive to light  Cardiovascular: Normal rate and regular rhythm  Pulmonary/Chest: Effort normal and breath sounds normal    Abdominal: Soft  Bowel sounds are normal    Musculoskeletal: She exhibits no edema  Additional Data:     Labs:      Results from last 7 days  Lab Units 18  0506   WBC Thousand/uL 6 13   HEMOGLOBIN g/dL 10 6*   HEMATOCRIT % 32 1*   PLATELETS Thousands/uL 255       Results from last 7 days  Lab Units 18  0506 18  1231   SODIUM mmol/L 141  --    POTASSIUM mmol/L 3 0*  --    CHLORIDE mmol/L 104  --    CO2 mmol/L 29  --    BUN mg/dL 14  --    CREATININE mg/dL 0 83  --    CALCIUM mg/dL 8 9  --    GLUCOSE, ISTAT mg/dl  --  87       Results from last 7 days  Lab Units 18  1207   INR  1 08       * I Have Reviewed All Lab Data Listed Above    * Additional Pertinent Lab Tests Reviewed: All Labs Within Last 24 Hours Reviewed    Imaging:    Imaging Reports Reviewed Today Include:   Imaging Personally Reviewed by Myself Includes:      Recent Cultures (last 7 days):           Last 24 Hours Medication List:     Current Facility-Administered Medications:  atorvastatin 40 mg Oral QPM Yuridia Washington MD   calcium carbonate 600 mg Oral Daily Yuridia Washington MD   cefTRIAXone 1,000 mg Intravenous Q24H Yuridia Washington MD   cholecalciferol 1,000 Units Oral Daily Yuridia Washington MD   clopidogrel 75 mg Oral HS Yuridia Washington MD   ferrous sulfate 325 mg Oral Daily Yuridia Washington MD   fish oil 1,000 mg Oral Daily Yuridia Washington MD   furosemide 40 mg Oral Daily Yuridia Washington MD   gabapentin 100 mg Oral BID Yuridia Washington MD   heparin (porcine) 5,000 Units Subcutaneous [facility] Yuridia Washington MD   loratadine 10 mg Oral Daily Yuridia Washington MD   nitroglycerin 0 2 mg Transdermal Daily Ivy Clarke PA-C   pantoprazole 40 mg Oral Daily Yuridia Washington MD   potassium chloride 40 mEq Oral BID Crissy Stinson MD        Today, Patient Was Seen By: Crissy Stinson MD    ** Please Note: Dragon 360 Dictation voice to text software may have been used in the creation of this document   **

## 2018-09-18 NOTE — SPEECH THERAPY NOTE
Speech-Language Pathology Bedside Swallow Evaluation      Patient Name: Fran Courser    RSJRQ'H Date: 9/18/2018     Problem List  Patient Active Problem List   Diagnosis    TIA (transient ischemic attack)    Mass of parotid gland    Dementia without behavioral disturbance    Acute encephalopathy    Acute cystitis without hematuria    Dyslipidemia    Gastroesophageal reflux disease without esophagitis    Bilateral hearing loss    Lower leg edema       Past Medical History  Past Medical History:   Diagnosis Date    Anemia     Cardiac disease     GERD (gastroesophageal reflux disease)     Hypertension     Stroke [facility])        Past Surgical History  Past Surgical History:   Procedure Laterality Date    APPENDECTOMY      CHOLECYSTECTOMY      FRACTURE SURGERY      HYSTERECTOMY         Summary   Pt presented with functional appearing oral and pharyngeal stage swallowing skills with materials administered today  Risk for Aspiration: none observed     Recommendations: mechanically altered/level 2 diet and thin liquids     Recommended Form of Meds: whole with puree     Aspiration precautions and compensatory swallowing strategies: upright posture, slow rate of feeding and pt self feeds and is aware of what she needs to do to be safe  Current Medical Status per MD  Pt is a 80 y o  female who presented to [facility] with Acute encephalopathy/ change in mental status  Exact etiology not clear  Patient has only 4-10 WBCs in the urine and positive for leukocyte Estrace  Symptoms could be secondary to UTI-present on admission  She also has history of TI A  Initial CTA unremarkable as she was a stroke alert  Her son is at bedside  Her son was working in the lawn and after he finished, he went check on her when he found her that she was asking that she needed help  She could not see and also she was not stable with her walker  Her speech was off    Called EMS and was brought into the hospital   Her symptoms are actually all gone now  Patient also was complaining of some pain in her neck and head when this happened  Pt was seen by ST brusht  In the recent past (7/18) for similar presenting symptoms with normal findings for swallowing function    Past medical history:  Please see H&P for details    Special Studies:  MRI pending  Xray chest: NAD  CTA (head and neck): No large vessel occlusion seen in the intracranial circulation  There is no evidence of carotid or vertebrobasilar dissection  No evidence of  steno-occlusive disease in the carotid     Social/Education/Vocational Hx:  Pt lives with family    Swallow Information   Current Risks for Dysphagia & Aspiration: hx  Of  CVA/TIA     Current Symptoms/Concerns: new neuro event    Current Diet: regular diet and thin liquids      Baseline Diet: mechanically altered/level 2 diet and thin liquids    Baseline Assessment   Behavior/Cognition: alert and oriented    Speech/Language Status: able to participate in conversation and able to follow commands    Patient Positioning: upright in chair    Pain Status/Interventions/Response to Interventions:   No report of or nonverbal indications of pain  Swallow Mechanism Exam   Oral-motor structures and function are WNL for symmetry, strength, ROM & coordination  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  Consistencies Assessed and Performance   Consistencies Administered: thin liquids, puree and soft solids  Specific materials administered included applesauce, gram cracker, and thin water via straw  Oral Stage: WFL  Mastication was functional for soft-meltable solids  Bolus formation and transfer were functional with no significant oral residue noted  No overt s/s reduced oral control  Pharyngeal Stage: WFL    Swallow Mechanics:  Swallowing initiation appeared prompt  Laryngeal rise was palpated and judged to be within functional limits    No coughing, throat clearing, change in vocal quality or respiratory status noted today  Esophageal Concerns: none reported    Strategies and Efficacy: provided pt can breakdown soft-solids and family prepares foods as such d/t large tumor and absent dentition pt can manage soft/finely chopped foods and thin liquids well  Summary and Recommendations (see above)    Results Reviewed with: patient and family             Marissa Travis MA, CCC/SLP  [id number]  [email or web address]  Available via tiger text

## 2018-09-18 NOTE — SOCIAL WORK
LOS- 1 DAY  PATIENT IS NOT A BUNDLE   PATIENT IS NOT A READMIT  CM met with patient at bedside  Patient lives alone in a 1 story house but her son reports he stays with her at night  Patient uses a walker and can complete ADLS by herself  Patient has VNA in the past and has no Hx of inpt rehab  Patient uses the [facility] and has an Rx plan  Patient's son is the POA  CM reviewed d/c planning process including the following: identifying help at home, patient preference for d/c planning needs,  [facility] Meds to Bed program, availability of treatment team to discuss questions or concerns patient and/or family may have regarding understanding medications and recognizing signs and symptoms once discharged  CM also encouraged patient to follow up with all recommended appointments after discharge  Patient advised of importance for patient and family to participate in managing patients medical well being  CM dept will continue to follow until d/c

## 2018-09-18 NOTE — PLAN OF CARE
Problem: DISCHARGE PLANNING  Goal: Discharge to home or other facility with appropriate resources  INTERVENTIONS:  - Identify barriers to discharge w/patient and caregiver  - Arrange for needed discharge resources and transportation as appropriate  - Identify discharge learning needs (meds, wound care, etc )  - Arrange for interpretive services to assist at discharge as needed  - Refer to Case Management Department for coordinating discharge planning if the patient needs post-hospital services based on physician/advanced practitioner order or complex needs related to functional status, cognitive ability, or social support system   Outcome: Progressing  LOS- 1 DAY  PATIENT IS NOT A BUNDLE   PATIENT IS NOT A READMIT  CM met with patient at bedside  Patient lives alone in a 1 story house but her son reports he stays with her at night  Patient uses a walker and can complete ADLS by herself  Patient has [facility]NA in the past and has no Hx of inpt rehab  Patient uses the [address] [place] [address] Avenue and has an Rx plan  Patient's son is the POA  CM reviewed d/c planning process including the following: identifying help at home, patient preference for d/c planning needs,  [facility] Med[facility] to Bed program, availability of treatment team to discuss questions or concerns patient and/or family may have regarding understanding medications and recognizing signs and symptoms once discharged  CM also encouraged patient to follow up with all recommended appointments after discharge  Patient advised of importance for patient and family to participate in managing patients medical well being  CM dept will continue to follow until d/c

## 2018-09-18 NOTE — PLAN OF CARE
Ability to express needs and understand communication Progressing      Discharge to home or other facility with appropriate resources Progressing      Absence or prevention of progression during hospitalization Progressing      Patient/family/caregiver demonstrates understanding of disease process, treatment plan, medications, and discharge instructions Progressing      Neurological status is stable or improving Progressing      Nutrition/Hydration status is improving Progressing      Non-ventilated patient's risk of aspiration is minimized Progressing      Patient will remain free of falls Progressing      Skin integrity is maintained or improved Progressing      Maintain or return mobility status to optimal level Progressing

## 2018-09-19 ENCOUNTER — APPOINTMENT (INPATIENT)
Dept: MRI IMAGING | Facility: HOSPITAL | Age: 83
DRG: 689 | End: 2018-09-19
Payer: MEDICARE

## 2018-09-19 PROBLEM — G93.40 ACUTE ENCEPHALOPATHY: Status: RESOLVED | Noted: 2018-09-17 | Resolved: 2018-09-19

## 2018-09-19 LAB
ANION GAP SERPL CALCULATED.3IONS-SCNC: 9 MMOL/L (ref 4–13)
BUN SERPL-MCNC: 13 MG/DL (ref 5–25)
CALCIUM SERPL-MCNC: 9.1 MG/DL (ref 8.3–10.1)
CHLORIDE SERPL-SCNC: 102 MMOL/L (ref 100–108)
CO2 SERPL-SCNC: 28 MMOL/L (ref 21–32)
CREAT SERPL-MCNC: 0.84 MG/DL (ref 0.6–1.3)
GFR SERPL CREATININE-BSD FRML MDRD: 59 ML/MIN/1.73SQ M
GLUCOSE SERPL-MCNC: 87 MG/DL (ref 65–140)
POTASSIUM SERPL-SCNC: 4.1 MMOL/L (ref 3.5–5.3)
SODIUM SERPL-SCNC: 139 MMOL/L (ref 136–145)

## 2018-09-19 PROCEDURE — 99232 SBSQ HOSP IP/OBS MODERATE 35: CPT | Performed by: GENERAL PRACTICE

## 2018-09-19 PROCEDURE — 80048 BASIC METABOLIC PNL TOTAL CA: CPT | Performed by: GENERAL PRACTICE

## 2018-09-19 PROCEDURE — G8987 SELF CARE CURRENT STATUS: HCPCS

## 2018-09-19 PROCEDURE — 70551 MRI BRAIN STEM W/O DYE: CPT

## 2018-09-19 PROCEDURE — 97110 THERAPEUTIC EXERCISES: CPT

## 2018-09-19 PROCEDURE — 97116 GAIT TRAINING THERAPY: CPT

## 2018-09-19 PROCEDURE — 97530 THERAPEUTIC ACTIVITIES: CPT

## 2018-09-19 PROCEDURE — 97167 OT EVAL HIGH COMPLEX 60 MIN: CPT

## 2018-09-19 PROCEDURE — G8988 SELF CARE GOAL STATUS: HCPCS

## 2018-09-19 RX ORDER — CEFUROXIME AXETIL 250 MG/1
500 TABLET ORAL EVERY 12 HOURS SCHEDULED
Status: DISCONTINUED | OUTPATIENT
Start: 2018-09-19 | End: 2018-09-20 | Stop reason: HOSPADM

## 2018-09-19 RX ADMIN — HEPARIN SODIUM 5000 UNITS: 5000 INJECTION, SOLUTION INTRAVENOUS; SUBCUTANEOUS at 05:22

## 2018-09-19 RX ADMIN — CALCIUM CARBONATE 600 MG: 1250 SUSPENSION ORAL at 22:03

## 2018-09-19 RX ADMIN — HEPARIN SODIUM 5000 UNITS: 5000 INJECTION, SOLUTION INTRAVENOUS; SUBCUTANEOUS at 13:54

## 2018-09-19 RX ADMIN — CLOPIDOGREL BISULFATE 75 MG: 75 TABLET ORAL at 22:02

## 2018-09-19 RX ADMIN — NITROGLYCERIN 0.2 MG: 0.2 PATCH TRANSDERMAL at 09:23

## 2018-09-19 RX ADMIN — HEPARIN SODIUM 5000 UNITS: 5000 INJECTION, SOLUTION INTRAVENOUS; SUBCUTANEOUS at 22:02

## 2018-09-19 RX ADMIN — CEFUROXIME AXETIL 500 MG: 250 TABLET ORAL at 22:02

## 2018-09-19 RX ADMIN — FUROSEMIDE 40 MG: 40 TABLET ORAL at 09:22

## 2018-09-19 RX ADMIN — VITAMIN D, TAB 1000IU (100/BT) 1000 UNITS: 25 TAB at 09:23

## 2018-09-19 RX ADMIN — GABAPENTIN 100 MG: 100 CAPSULE ORAL at 09:22

## 2018-09-19 RX ADMIN — GABAPENTIN 100 MG: 100 CAPSULE ORAL at 18:15

## 2018-09-19 RX ADMIN — LORATADINE 10 MG: 10 TABLET ORAL at 09:23

## 2018-09-19 RX ADMIN — PANTOPRAZOLE SODIUM 40 MG: 40 TABLET, DELAYED RELEASE ORAL at 09:23

## 2018-09-19 RX ADMIN — CEFUROXIME AXETIL 500 MG: 250 TABLET ORAL at 12:55

## 2018-09-19 RX ADMIN — ATORVASTATIN CALCIUM 40 MG: 40 TABLET, FILM COATED ORAL at 18:15

## 2018-09-19 RX ADMIN — FERROUS SULFATE TAB 325 MG (65 MG ELEMENTAL FE) 325 MG: 325 (65 FE) TAB at 22:02

## 2018-09-19 RX ADMIN — Medication 1000 MG: at 09:22

## 2018-09-19 NOTE — CONSULTS
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  Plan:   1  Dermagran to skin tear on right forearm  Change QOD  If dressing is sticking when changed, decrease frequency to q day  2  Elevate heels off bed  3  Hydraguard to heels, buttocks, and sacrum BID  4  Moisturize skin q day  5  Soft chair cushion to chair when up  6  Assist pt  With repositioning in bed frequently  7  Wound care follow up q week and prn  Wound care nurse discussed skin care plan with nurse Karen Berry and with patient  Vitals: Blood pressure 137/90, pulse 61, temperature 98 4 °F (36 9 °C), temperature source Oral, resp  rate 18, height 4' 11" (1 499 m), weight 58 1 kg (128 lb), SpO2 93 %  ,Body mass index is 25 85 kg/m²  Wound 09/17/18 Skin tear Arm Right; Lower skin tear (Active)   Wound Description Beefy red 9/19/2018 10:30 AM   Rylie-wound Assessment Intact 9/19/2018 10:30 AM   Wound Length (cm) 4 8 cm 9/19/2018 10:30 AM   Wound Width (cm) 3 6 cm 9/19/2018 10:30 AM   Wound Depth (cm) 0 1 9/19/2018 10:30 AM   Calculated Wound Volume (cm^3) 1 73 cm^3 9/19/2018 10:30 AM   Tunneling 0 cm 9/19/2018 10:30 AM   Undermining 0 9/19/2018 10:30 AM   Drainage Amount Scant 9/19/2018 10:30 AM   Drainage Description Bloody 9/19/2018 10:30 AM   Non-staged Wound Description Partial thickness 9/19/2018 10:30 AM

## 2018-09-19 NOTE — PROGRESS NOTES
[facility] Progress Note  Patient: Savannah Hinds 80 y o  female   MRN: [id number]  PCP: Domenic Chamorro DO  Unit/Bed#: [facility] Encounter: [id number]  Date Of Visit: 09/19/18    Assessment:    Principal Problem:    Acute encephalopathy  Active Problems:    TIA (transient ischemic attack)    Mass of parotid gland    Dementia without behavioral disturbance    Acute cystitis without hematuria    Dyslipidemia    Gastroesophageal reflux disease without esophagitis    Bilateral hearing loss    Lower leg edema      Plan:    · Acute encephalopathy  Now back to baseline  Patient has only 4-10 WBCs in the urine and positive for leukocyte Estrace   Symptoms could be secondary to UTI-present on admission-UC neg   She also has history of TI A   Initial CTA unremarkable as she was a stroke alert  MRI of her brain had cta head and neck   Discussed with patient's son  Jeimy Monsivais other aggressive treatment   Continue with Plavix   Neurology consult appreciated   PT/O T-likely needs short-term rehab     ? Dyslipidemia   Continue with statin  ? Bilateral lower extremities edema-chronic   Patient is on diuretic chronically   She recently had an echocardiogram which showed normal ejection fraction  ? Chronic parotid gland mass   No further recommendations or treatment  ? Hearing loss-quite significant   She wears hearing aid and it does not help much either  ? UTI-present on admission  Jesus Mole negative now on ceftin-procalcitonin neg  Wound-right forearm   Patient had her skin ripped off couple of weeks or so ago  wound care consultation               VTE Pharmacologic Prophylaxis:   Pharmacologic: Heparin  Mechanical VTE Prophylaxis in Place: Yes    Patient Centered Rounds: I have performed bedside rounds with nursing staff today  Discussions with Specialists or Other Care Team Provider:     Education and Discussions with Family / Patient:     Time Spent for Care: 30 minutes    More than 50% of total time spent on counseling and coordination of care as described above  Current Length of Stay: 2 day(s)    Current Patient Status: Inpatient   Certification Statement: The patient will continue to require additional inpatient hospital stay due to [facility] pending needs STR    Discharge Plan: STR    Code Status: Level 3 - DNAR and DNI      Subjective:   No c/o back to baseline er son    Objective:     Vitals:   Temp (24hrs), Av °F (36 7 °C), Min:97 6 °F (36 4 °C), Max:98 5 °F (36 9 °C)    HR:  [60-80] 61  Resp:  [18] 18  BP: (102-137)/(57-90) 137/90  SpO2:  [92 %-93 %] 93 %  Body mass index is 25 85 kg/m²  Input and Output Summary (last 24 hours): Intake/Output Summary (Last 24 hours) at 18 0936  Last data filed at 18 2244   Gross per 24 hour   Intake              240 ml   Output             1100 ml   Net             -860 ml       Physical Exam:     Physical Exam   Constitutional: She appears well-developed and well-nourished  HENT:   Head: Normocephalic and atraumatic  Eyes: Pupils are equal, round, and reactive to light  Cardiovascular: Normal rate and regular rhythm  Pulmonary/Chest: Effort normal and breath sounds normal    Abdominal: Soft  Bowel sounds are normal    Musculoskeletal: She exhibits edema  Neurological: She is alert  Additional Data:     Labs:      Results from last 7 days  Lab Units 18  0506   WBC Thousand/uL 6 13   HEMOGLOBIN g/dL 10 6*   HEMATOCRIT % 32 1*   PLATELETS Thousands/uL 255       Results from last 7 days  Lab Units 18  0515  18  1231   SODIUM mmol/L 139  < >  --    POTASSIUM mmol/L 4 1  < >  --    CHLORIDE mmol/L 102  < >  --    CO2 mmol/L 28  < >  --    BUN mg/dL 13  < >  --    CREATININE mg/dL 0 84  < >  --    CALCIUM mg/dL 9 1  < >  --    GLUCOSE, ISTAT mg/dl  --   --  87   < > = values in this interval not displayed  Results from last 7 days  Lab Units 18  1207   INR  1 08       * I Have Reviewed All Lab Data Listed Above    * Additional Pertinent Lab Tests Reviewed: All Labs Within Last 24 Hours Reviewed    Imaging:    Imaging Reports Reviewed Today Include:   Imaging Personally Reviewed by Myself Includes:     Recent Cultures (last 7 days):       Results from last 7 days  Lab Units 09/17/18  1418   URINE CULTURE  60,000-69,000 cfu/ml        Last 24 Hours Medication List:     Current Facility-Administered Medications:  atorvastatin 40 mg Oral QPM Lillie Franks MD   calcium carbonate 600 mg Oral Daily Lillie Franks MD   cefuroxime 500 mg Oral Q12H [facility] Houston Munoz MD   cholecalciferol 1,000 Units Oral Daily Lillie Franks MD   clopidogrel 75 mg Oral HS Lillie Franks MD   ferrous sulfate 325 mg Oral Daily Lillie Franks MD   fish oil 1,000 mg Oral Daily Lillie Franks MD   furosemide 40 mg Oral Daily Lillie Franks MD   gabapentin 100 mg Oral BID Lillie Franks MD   heparin (porcine) 5,000 Units Subcutaneous [facility] Lillie Franks MD   loratadine 10 mg Oral Daily Lillie Franks MD   nitroglycerin 0 2 mg Transdermal Daily Ivy Clarke PA-C   pantoprazole 40 mg Oral Daily Lillie Franks MD        Today, Patient Was Seen By: Houston Munoz MD    ** Please Note: Dragon 360 Dictation voice to text software may have been used in the creation of this document   **

## 2018-09-19 NOTE — PLAN OF CARE
Problem: OCCUPATIONAL THERAPY ADULT  Goal: Performs self-care activities at highest level of function for planned discharge setting  See evaluation for individualized goals  Treatment Interventions: ADL retraining, Functional transfer training, Endurance training, Patient/family training, Equipment evaluation/education, Fine motor coordination activities, Compensatory technique education, Continued evaluation, Energy conservation, Activityengagement          See flowsheet documentation for full assessment, interventions and recommendations  Limitation: Decreased ADL status, Decreased endurance, Decreased self-care trans, Decreased fine motor control, Decreased high-level ADLs  Prognosis: Good  Assessment: Patient is a 80 y o  female seen for OT evaluation s/p admit to [id number] [place] [address] on 9/17/2018 w/Acute encephalopathy  Patient presented to ED with change in mental status  Family reported  she could not see and also she was not stable with her walker  Her speech was off  Called EMS and was brought into the hospital  Commorbidities affecting patient's functional performance at time of assessment include: Anemia, Cardiac disease, GERD, HTN, h/o of stroke  Orders placed for OT evaluation and treatment   Performed at least two patient identifiers during session including name and wristband  Prior to admission, Patient reporting indepependnet with self care and basic house work: light meal prep and making her bed  Ambulates with a RW, has family support during the day, daughters and grandchildren, her Son stays overnight  Patient has a life alert  Personal factors affecting patient at time of initial evaluation include: decreased initiation and engagement, difficulty performing ADLs and difficulty performing IADLs   Upon evaluation, patient requires minimal  assist for UB ADLs, minimal  assist for LB ADLs, transfers and functional ambulation in room and bathroom with minimal  assist, with the use of Rolling Walker  Occupational performance is affected by the following deficits: decreased functional use of BUEs, decreased muscle strength, degenerative arthritic joint changes, impaired gross motor coordination, dynamic sit/ stand balance deficit with poor standing tolerance time for self care and functional mobility, decreased activity tolerance, impaired problem solving, decreased safety awareness and postural control and postural alignment deficit, requiring external assistance to complete transitional movements  Therapist completed  extensive additional review of medical records and additional review of physical, cognitive or psychosocial history, clinical examination identifying 5 or more performance deficits, clinical decision making of a high complexity , consistent with a high complexity level evaluation  Patient to benefit from continued Occupational Therapy treatment while in the hospital to address deficits as defined above and maximize level of functional independence with ADLs and functional mobility        OT Discharge Recommendation: Home OT  OT - OK to Discharge: Yes (Home w family support/ Home OT)

## 2018-09-19 NOTE — PHYSICAL THERAPY NOTE
Physical Therapy Treatment Note     09/19/18 9175   Pain Assessment   Pain Assessment No/denies pain   Pain Score No Pain   Restrictions/Precautions   Weight Bearing Precautions Per Order No   Braces or Orthoses (none pe rpt)   Other Precautions Chair Alarm; Bed Alarm;Hard of hearing; Fall Risk   General   Chart Reviewed Yes   Response to Previous Treatment Patient with no complaints from previous session  Family/Caregiver Present No   Cognition   Overall Cognitive Status WFL   Arousal/Participation Alert; Cooperative   Attention Attends with cues to redirect   Orientation Level Oriented X4   Memory Within functional limits   Following Commands Follows all commands and directions without difficulty   Comments pt agreeable to PT session   Subjective   Subjective "I thought I would be home"   Bed Mobility   Additional Comments pt received seated OOB in recliner upon arrival   Transfers   Sit to Stand 5  Supervision   Additional items Assist x 1; Armrests; Increased time required;Verbal cues   Stand to Sit 5  Supervision   Additional items Assist x 1; Armrests; Increased time required;Verbal cues   Toilet transfer 5  Supervision   Additional items Assist x 1; Armrests; Increased time required;Verbal cues; Commode   Ambulation/Elevation   Gait pattern Short stride; Improper Weight shift;Decreased foot clearance; Forward Flexion   Gait Assistance 5  Supervision  (SBA, occasional CGA)   Additional items Assist x 1;Verbal cues; Tactile cues  (for RW management, sequencing)   Assistive Device Rolling walker   Distance 20' x2   Stair Management Assistance 4  Minimal assist   Additional items Assist x 1;Verbal cues; Increased time required   Stair Management Technique Foreward;Backward; With walker  ( step)   Number of Stairs 3  ( step x3 attempts)   Ramp Technique Not tested   Balance   Static Sitting Good   Dynamic Sitting Fair +   Static Standing Fair   Dynamic Standing Fair -   Ambulatory Fair -   Endurance Deficit Endurance Deficit Yes   Activity Tolerance   Activity Tolerance Patient limited by fatigue   Nurse Made Aware yes, Javier Horowitz RN verbalized patient appropriate for PT session, made aware of session outcomes   Exercises   Heelslides Sitting;10 reps;AROM; Bilateral   Hip Abduction Sitting;10 reps;AROM; Bilateral   Hip Adduction Sitting;10 reps;AROM; Bilateral   Knee AROM Long Arc Quad Sitting;10 reps;AROM; Bilateral   Ankle Pumps Sitting;10 reps;AROM; Bilateral   Marching Sitting;10 reps;AROM; Bilateral   Assessment   Prognosis Good   Problem List Impaired balance;Decreased mobility; Impaired hearing;Decreased skin integrity; Decreased endurance;Decreased strength   Assessment Pt seen for PT treatment session this date with interventions consisting of gait training w/ emphasis on improving pt's ability to ambulate level surfaces x 20' x2 ft with SBA provided by therapist with RW, Therapeutic exercise consisting of: AROM 10 reps B LE in sitting position, therapeutic activity consisting of training: sit<>stand transfers, vc and tactile cues for static standing posture faciliation and toilet transfers on/off commode and navigating 3 step ups with B UE support of RW with min A of therapist  Pt agreeable to PT treatment session upon arrival, pt found seated OOB in recliner, in no apparent distress, A&O x 4 and responsive  In comparison to previous session, pt with improvements in continued household distance gait trials with RW, no overt LOB observed, increased time required for all phases of mobility, pt denying any lightheadedness/dizziness/SOB  Pt notes she performs ther ex outside of therapy session, agreeable to performing at this time to tolerance  Post session: pt returned back to recliner, chair alarm engaged, all needs in reach and RN notified of session findings/recommendations and NSG staff educated/encouraged to mobilize A x1 with RW   Continue to recommend Home PT with family support at time of d/c in order to maximize pt's functional independence and safety w/ mobility  Pt continues to be functioning below baseline level, and remains limited 2* factors listed above  PT will continue to see pt while here in order to address the deficits listed above and provide interventions consistent w/ POC in effort to achieve STGs  Barriers to Discharge Decreased caregiver support   Barriers to Discharge Comments pt lives alone, but very supportive family, pt reports son stays with her overnight daily   Goals   Patient Goals to return home   STG Expiration Date 09/28/18   Treatment Day 1   Plan   Treatment/Interventions ADL retraining;Functional transfer training;LE strengthening/ROM; Elevations; Therapeutic exercise; Endurance training;Patient/family training;Equipment eval/education; Bed mobility;Gait training;Spoke to nursing;Family   Progress Progressing toward goals   PT Frequency 5x/wk   Recommendation   Recommendation Home PT; Home with family support   Equipment Recommended (RW)   PT - OK to Discharge No   Additional Comments pt to demonstrate stair navigation consistency without external assistance       Gerardo Nunez, PT, DPT    Time of PT treatment session: 4108-9090

## 2018-09-19 NOTE — PLAN OF CARE
Problem: PHYSICAL THERAPY ADULT  Goal: Performs mobility at highest level of function for planned discharge setting  See evaluation for individualized goals  Treatment/Interventions: ADL retraining, Functional transfer training, LE strengthening/ROM, Elevations, Therapeutic exercise, Endurance training, Patient/family training, Equipment eval/education, Bed mobility, Gait training, Spoke to nursing, Family  Equipment Recommended: Other (Comment) (Patient has personal walker)       See flowsheet documentation for full assessment, interventions and recommendations  Outcome: Progressing  Prognosis: Good  Problem List: Impaired balance, Decreased mobility, Impaired hearing, Decreased skin integrity, Decreased endurance, Decreased strength  Assessment: Pt seen for PT treatment session this date with interventions consisting of gait training w/ emphasis on improving pt's ability to ambulate level surfaces x 20' x2 ft with SBA provided by therapist with RW, Therapeutic exercise consisting of: AROM 10 reps B LE in sitting position, therapeutic activity consisting of training: sit<>stand transfers, vc and tactile cues for static standing posture faciliation and toilet transfers on/off commode and navigating 3 step ups with B UE support of RW with min A of therapist  Pt agreeable to PT treatment session upon arrival, pt found seated OOB in recliner, in no apparent distress, A&O x 4 and responsive  In comparison to previous session, pt with improvements in continued household distance gait trials with RW, no overt LOB observed, increased time required for all phases of mobility, pt denying any lightheadedness/dizziness/SOB  Pt notes she performs ther ex outside of therapy session, agreeable to performing at this time to tolerance  Post session: pt returned back to recliner, chair alarm engaged, all needs in reach and RN notified of session findings/recommendations and NSG staff educated/encouraged to mobilize A x1 with RW  Continue to recommend Home PT with family support at time of d/c in order to maximize pt's functional independence and safety w/ mobility  Pt continues to be functioning below baseline level, and remains limited 2* factors listed above  PT will continue to see pt while here in order to address the deficits listed above and provide interventions consistent w/ POC in effort to achieve STGs  Barriers to Discharge: Decreased caregiver support  Barriers to Discharge Comments: pt lives alone, but very supportive family, pt reports son stays with her overnight daily  Recommendation: Home PT, Home with family support     PT - OK to Discharge: No    See flowsheet documentation for full assessment

## 2018-09-19 NOTE — PHYSICIAN ADVISOR
Current patient class: Inpatient  The patient is currently on Hospital Day: 2 at [address]      The patient was admitted to the hospital at [phone] on 9/17/18 for the following diagnosis:  TIA (transient ischemic attack) [G45 9]  Dizziness [R42]  Slurred speech [R47 81]  UTI (urinary tract infection) [N39 0]  Open wound of right forearm, initial encounter [S51 801A]       There is documentation in the medical record of an expected length of stay of at least 2 midnights  The patient is therefore expected to satisfy the 2 midnight benchmark and given the 2 midnight presumption is appropriate for INPATIENT ADMISSION  Given this expectation of a satisfying stay, CMS instructs us that the patient is most often appropriate for inpatient admission under part A provided medical necessity is documented in the chart  After review of the relevant documentation, labs, vital signs and test results, the patient is appropriate for INPATIENT ADMISSION  Admission to the hospital as an inpatient is a complex decision making process which requires the practitioner to consider the patients presenting complaint, history and physical examination and all relevant testing  With this in mind, in this case, the patient was deemed appropriate for INPATIENT ADMISSION  After review of the documentation and testing available at the time of the admission I concur with this clinical determination of medical necessity  Rationale is as follows: The patient is a 80 yrs old Female who presented to the ED at 9/17/2018 11:29 AM with a chief complaint of CVA/TIA-like Symptoms (Family states"patient started feeling dizziness at around 930am" Family also stated patient stated"she couldnt see and had blurry vision along with weakness, slurred speech, and pain in her head and neck"  )     Given the need for further hospitalization, and along with the documentation of medical necessity present in the chart, the patient is appropriate for inpatient admission  The patient is expected to satisfy the 2 midnight benchmark, and will require further acute medical care  The patient does have comorbid conditions which increases the risk for significant adverse outcome  Given this the patient is appropriate for inpatient admission  The patients vitals on arrival were ED Triage Vitals   Temperature Pulse Respirations Blood Pressure SpO2   09/17/18 1119 09/17/18 1119 09/17/18 1119 09/17/18 1119 09/17/18 1119   98 5 °F (36 9 °C) 88 18 122/62 97 %      Temp Source Heart Rate Source Patient Position - Orthostatic VS BP Location FiO2 (%)   09/17/18 1119 09/17/18 1119 09/17/18 1119 09/17/18 1119 --   Oral Monitor Lying Right arm       Pain Score       09/17/18 1137       2           Past Medical History:   Diagnosis Date    Anemia     Cardiac disease     GERD (gastroesophageal reflux disease)     Hypertension     Stroke [facility])      Past Surgical History:   Procedure Laterality Date    APPENDECTOMY      CHOLECYSTECTOMY      FRACTURE SURGERY      HYSTERECTOMY             Consults have been placed to:   IP CONSULT TO NEUROLOGY  IP CONSULT TO WOUND CARE  IP CONSULT TO CASE MANAGEMENT  IP CONSULT TO NUTRITION SERVICES  IP CONSULT TO NEUROLOGY    Vitals:    09/18/18 1200 09/18/18 1217 09/18/18 1219 09/18/18 1600   BP: 120/62 118/57  102/58   BP Location: Left arm      Pulse: 60 60  74   Resp: 18   18   Temp: 97 9 °F (36 6 °C) 97 9 °F (36 6 °C)  97 6 °F (36 4 °C)   TempSrc: Oral   Oral   SpO2: 93% 92%  93%   Weight:   58 1 kg (128 lb)    Height:   4' 11" (1 499 m)        Most recent labs:    Recent Labs      09/17/18   1207   09/18/18   0506   WBC  7 27   --   6 13   HGB  12 3   < >  10 6*   HCT  36 7   < >  32 1*   PLT  289   --   255   K  3 4*   --   3 0*   NA  137   --   141   CALCIUM  9 6   --   8 9   BUN  14   --   14   CREATININE  0 86   --   0 83   INR  1 08   --    --     < > = values in this interval not displayed         Scheduled Meds:  Current Facility-Administered Medications:  atorvastatin 40 mg Oral QPM Clara Matthews MD    calcium carbonate 600 mg Oral Daily Clara Matthews MD    cefTRIAXone 1,000 mg Intravenous Q24H Clara Matthews MD Last Rate: 1,000 mg (09/18/18 1338)   cholecalciferol 1,000 Units Oral Daily Clara Matthews MD    clopidogrel 75 mg Oral HS Clara Matthews MD    ferrous sulfate 325 mg Oral Daily Clara Matthews MD    fish oil 1,000 mg Oral Daily Clara Matthews MD    furosemide 40 mg Oral Daily Clara Matthews MD    gabapentin 100 mg Oral BID Clara Matthews MD    heparin (porcine) 5,000 Units Subcutaneous [facility] Clara Matthews MD    loratadine 10 mg Oral Daily Clara Matthews MD    nitroglycerin 0 2 mg Transdermal Daily Ivy Clarke PA-C    pantoprazole 40 mg Oral Daily Clara Matthews MD      Continuous Infusions:   PRN Meds:      Surgical procedures (if appropriate):

## 2018-09-19 NOTE — PLAN OF CARE
Activity Intolerance/Impaired Mobility     Mobility/activity is maintained at optimum level for patient Progressing        Neurological Deficit     Neurological status is stable or improving Progressing        Potential for Aspiration     Non-ventilated patient's risk of aspiration is minimized Progressing        Potential for Falls     Patient will remain free of falls Progressing        SAFETY ADULT     Maintain or return mobility status to optimal level Progressing

## 2018-09-19 NOTE — DISCHARGE INSTR - OTHER ORDERS
Skin/wound care Plan:   1  Dermagran to skin tear on right forearm  Change QOD  If dressing is sticking when changed, decrease frequency to q day  2  Elevate heels off bed  3  Hydraguard to heels, buttocks, and sacrum BID  4  Moisturize skin q day  5  Soft chair cushion to chair when up  6  Assist pt   With repositioning in bed frequently
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
## 2018-09-20 VITALS
HEIGHT: 59 IN | WEIGHT: 128 LBS | OXYGEN SATURATION: 94 % | TEMPERATURE: 98.4 F | SYSTOLIC BLOOD PRESSURE: 116 MMHG | HEART RATE: 85 BPM | DIASTOLIC BLOOD PRESSURE: 59 MMHG | BODY MASS INDEX: 25.8 KG/M2 | RESPIRATION RATE: 18 BRPM

## 2018-09-20 PROCEDURE — 99239 HOSP IP/OBS DSCHRG MGMT >30: CPT | Performed by: GENERAL PRACTICE

## 2018-09-20 PROCEDURE — 97535 SELF CARE MNGMENT TRAINING: CPT

## 2018-09-20 PROCEDURE — 99232 SBSQ HOSP IP/OBS MODERATE 35: CPT | Performed by: GENERAL PRACTICE

## 2018-09-20 PROCEDURE — 97116 GAIT TRAINING THERAPY: CPT

## 2018-09-20 PROCEDURE — 97110 THERAPEUTIC EXERCISES: CPT

## 2018-09-20 RX ORDER — CEFUROXIME AXETIL 500 MG/1
500 TABLET ORAL EVERY 12 HOURS SCHEDULED
Qty: 10 TABLET | Refills: 0 | Status: SHIPPED | OUTPATIENT
Start: 2018-09-20 | End: 2018-09-25

## 2018-09-20 RX ADMIN — CEFUROXIME AXETIL 500 MG: 250 TABLET ORAL at 09:29

## 2018-09-20 RX ADMIN — NITROGLYCERIN 0.2 MG: 0.2 PATCH TRANSDERMAL at 09:33

## 2018-09-20 RX ADMIN — LORATADINE 10 MG: 10 TABLET ORAL at 09:28

## 2018-09-20 RX ADMIN — VITAMIN D, TAB 1000IU (100/BT) 1000 UNITS: 25 TAB at 09:28

## 2018-09-20 RX ADMIN — FUROSEMIDE 40 MG: 40 TABLET ORAL at 09:28

## 2018-09-20 RX ADMIN — HEPARIN SODIUM 5000 UNITS: 5000 INJECTION, SOLUTION INTRAVENOUS; SUBCUTANEOUS at 05:05

## 2018-09-20 RX ADMIN — PANTOPRAZOLE SODIUM 40 MG: 40 TABLET, DELAYED RELEASE ORAL at 09:28

## 2018-09-20 RX ADMIN — GABAPENTIN 100 MG: 100 CAPSULE ORAL at 09:28

## 2018-09-20 RX ADMIN — Medication 1000 MG: at 09:28

## 2018-09-20 NOTE — PHYSICAL THERAPY NOTE
Physical Therapy Treatment Note       09/20/18 1118   Pain Assessment   Pain Assessment No/denies pain   Pain Score No Pain   Restrictions/Precautions   Weight Bearing Precautions Per Order No   Braces or Orthoses (none per pt)   Other Precautions Chair Alarm; Bed Alarm;Hard of hearing; Fall Risk   General   Chart Reviewed Yes   Response to Previous Treatment Patient with no complaints from previous session  Family/Caregiver Present Yes  (son)   Cognition   Overall Cognitive Status WFL   Arousal/Participation Alert; Cooperative   Attention Attends with cues to redirect   Orientation Level Oriented X4   Memory Within functional limits   Following Commands Follows all commands and directions without difficulty   Comments pt agreeable to PT session, [place]   Subjective   Subjective "I want to go home"   Bed Mobility   Additional Comments pt received seated OOB in recliner upon arrival   Transfers   Sit to Stand 5  Supervision   Additional items Assist x 1; Armrests; Increased time required;Verbal cues   Stand to Sit 5  Supervision   Additional items Assist x 1; Armrests; Increased time required;Verbal cues   Ambulation/Elevation   Gait pattern Short stride; Improper Weight shift;Decreased foot clearance; Forward Flexion   Gait Assistance 4  Minimal assist  (CGA)   Additional items Assist x 1;Verbal cues; Tactile cues  (for RW management, sequencing)   Assistive Device Rolling walker   Distance 50'   Stair Management Assistance 4  Minimal assist   Additional items Assist x 1;Verbal cues; Increased time required  (for pacing, sequencing)   Stair Management Technique Foreward;Backward; With walker  ( step)   Number of Stairs 3  ( step x3 attempts)   Ramp Technique Not tested   Balance   Static Sitting Good   Dynamic Sitting Fair +   Static Standing Fair   Dynamic Standing Fair -   Ambulatory Fair -   Endurance Deficit   Endurance Deficit Yes   Activity Tolerance   Activity Tolerance Patient tolerated treatment well Nurse Made Aware yes, Karissa Garcia RN verbalized patient appropriate for PT session, made aware of session outcomes   Exercises   Heelslides Sitting;10 reps;AROM; Bilateral   Hip Abduction Sitting;10 reps;AROM; Bilateral   Hip Adduction Sitting;10 reps;AROM; Bilateral   Knee AROM Long Arc Quad Sitting;10 reps;AROM; Bilateral   Ankle Pumps Sitting;10 reps;AROM; Bilateral   Marching Sitting;10 reps;AROM; Bilateral   Assessment   Prognosis Good   Problem List Impaired balance;Decreased mobility; Impaired hearing;Decreased skin integrity; Decreased endurance;Decreased strength   Assessment Pt seen for PT treatment session this date with interventions consisting of gait training w/ emphasis on improving pt's ability to ambulate level surfaces x 50' with SBA provided by therapist with RW, Therapeutic exercise consisting of: AROM 10 reps B LE in sitting position, therapeutic activity consisting of training: sit<>stand transfers, vc and tactile cues for static standing posture faciliation and toilet transfers on/off commode and navigating 3 step ups with B UE support of RW with min A of therapist  Education on home safety regarding tripping hazards  Pt agreeable to PT treatment session upon arrival, pt found seated OOB in recliner, in no apparent distress, A&O x 4 and responsive; [place]  In comparison to previous session, pt with improvements in continued household distance gait trials with RW, no overt LOB observed, increased time required for all phases of mobility, pt denying any lightheadedness/dizziness/SOB  Post session: pt returned back to recliner, chair alarm engaged, all needs in reach and RN notified of session findings/recommendations and NSG staff educated/encouraged to mobilize A x1 with RW  Recommend STR at time of d/c in order to maximize pt's functional independence and safety w/ mobility  Pt continues to be functioning below baseline level, and remains limited 2* factors listed above   PT will continue to see pt while here in order to address the deficits listed above and provide interventions consistent w/ POC in effort to achieve STGs  Barriers to Discharge Decreased caregiver support   Barriers to Discharge Comments pt lives alone, but very supportive family, pt reports son stays with her overnight daily   Goals   Patient Goals to return home, pt refusing STR   STG Expiration Date 09/28/18   Short Term Goal #1 STGs remain appropriate   Treatment Day 2   Plan   Treatment/Interventions ADL retraining;Functional transfer training;LE strengthening/ROM; Elevations; Therapeutic exercise; Endurance training;Patient/family training;Equipment eval/education; Bed mobility;Gait training;Spoke to nursing;Family   Progress Progressing toward goals   PT Frequency 5x/wk   Recommendation   Recommendation Short-term skilled PT  (vs HHPT with family, pt refuses STR)   Equipment Recommended Walker  (RW)   PT - OK to Discharge No   Additional Comments pt to demonstrate stair navigation consistency without external assistance       Tl He, PT, DPT    Time of PT treatment session: [phone]
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
## 2018-09-20 NOTE — PLAN OF CARE
Problem: PHYSICAL THERAPY ADULT  Goal: Performs mobility at highest level of function for planned discharge setting  See evaluation for individualized goals  Treatment/Interventions: ADL retraining, Functional transfer training, LE strengthening/ROM, Elevations, Therapeutic exercise, Endurance training, Patient/family training, Equipment eval/education, Bed mobility, Gait training, Spoke to nursing, Family  Equipment Recommended: Other (Comment) (Patient has personal walker)       See flowsheet documentation for full assessment, interventions and recommendations  Outcome: Progressing  Prognosis: Good  Problem List: Impaired balance, Decreased mobility, Impaired hearing, Decreased skin integrity, Decreased endurance, Decreased strength  Assessment: Pt seen for PT treatment session this date with interventions consisting of gait training w/ emphasis on improving pt's ability to ambulate level surfaces x 50' with SBA provided by therapist with RW, Therapeutic exercise consisting of: AROM 10 reps B LE in sitting position, therapeutic activity consisting of training: sit<>stand transfers, vc and tactile cues for static standing posture faciliation and toilet transfers on/off commode and navigating 3 step ups with B UE support of RW with min A of therapist  Education on home safety regarding tripping hazards  Pt agreeable to PT treatment session upon arrival, pt found seated OOB in recliner, in no apparent distress, A&O x 4 and responsive; [place]  In comparison to previous session, pt with improvements in continued household distance gait trials with RW, no overt LOB observed, increased time required for all phases of mobility, pt denying any lightheadedness/dizziness/SOB  Post session: pt returned back to recliner, chair alarm engaged, all needs in reach and RN notified of session findings/recommendations and NSG staff educated/encouraged to mobilize A x1 with RW   Recommend STR at time of d/c in order to maximize pt's functional independence and safety w/ mobility  Pt continues to be functioning below baseline level, and remains limited 2* factors listed above  PT will continue to see pt while here in order to address the deficits listed above and provide interventions consistent w/ POC in effort to achieve STGs  Barriers to Discharge: Decreased caregiver support  Barriers to Discharge Comments: pt lives alone, but very supportive family, pt reports son stays with her overnight daily  Recommendation: Short-term skilled PT (vs HHPT with family, pt refuses STR)     PT - OK to Discharge: No    See flowsheet documentation for full assessment

## 2018-09-20 NOTE — SOCIAL WORK
CM and PTPedro made a joint visit to meet with patient and son-Jeff to confirm discharge plan  Patient and Geronimo Knife state patient will go home with [address] and they both are declining STR  Referral has been sent  IMM was informed and a copy is given to patient  IMM is in the chart  Patient's son- Geronimo Montiel declined this CM to schedule the HRR appt  Instead Winston [place]uziel Montiel  states he will schedule patient's PCP appt himself for Monday  Nurse and SLIM notified

## 2018-09-20 NOTE — PLAN OF CARE
Activity Intolerance/Impaired Mobility     Mobility/activity is maintained at optimum level for patient Progressing        Communication Impairment     Ability to express needs and understand communication [address] Discharge to home or other facility with appropriate resources Progressing        INFECTION - ADULT     Absence or prevention of progression during hospitalization Progressing        Knowledge Deficit     Patient/family/caregiver demonstrates understanding of disease process, treatment plan, medications, and discharge instructions Progressing        Neurological Deficit     Neurological status is stable or improving Progressing        Nutrition     Nutrition/Hydration status is improving Progressing        Nutrition/Hydration-ADULT     Nutrient/Hydration intake appropriate for improving, restoring or maintaining nutritional needs Progressing        Potential for Aspiration     Non-ventilated patient's risk of aspiration is minimized Progressing        Potential for Falls     Patient will remain free of falls Progressing        Prexisting or High Potential for Compromised Skin Integrity     Skin integrity is maintained or improved Progressing        SAFETY ADULT     Maintain or return mobility status to optimal level Progressing

## 2018-11-12 ENCOUNTER — APPOINTMENT (EMERGENCY)
Dept: CT IMAGING | Facility: HOSPITAL | Age: 83
DRG: 813 | End: 2018-11-12
Payer: MEDICARE

## 2018-11-12 ENCOUNTER — HOSPITAL ENCOUNTER (INPATIENT)
Facility: HOSPITAL | Age: 83
LOS: 5 days | Discharge: NURSING, REHAB OR LONG-TERM CARE | DRG: 813 | End: 2018-11-17
Attending: EMERGENCY MEDICINE | Admitting: INTERNAL MEDICINE
Payer: MEDICARE

## 2018-11-12 DIAGNOSIS — R33.9 URINARY RETENTION: ICD-10-CM

## 2018-11-12 DIAGNOSIS — I48.91 ATRIAL FIBRILLATION, UNSPECIFIED TYPE (HCC): ICD-10-CM

## 2018-11-12 DIAGNOSIS — K92.2 GI BLEED: Primary | ICD-10-CM

## 2018-11-12 LAB
ABO GROUP BLD: NORMAL
ALBUMIN SERPL BCP-MCNC: 3.6 G/DL (ref 3.5–5)
ALP SERPL-CCNC: 95 U/L (ref 46–116)
ALT SERPL W P-5'-P-CCNC: 23 U/L (ref 12–78)
ANION GAP SERPL CALCULATED.3IONS-SCNC: 10 MMOL/L (ref 4–13)
APTT PPP: 33 SECONDS (ref 24–36)
APTT PPP: >210 SECONDS (ref 24–36)
AST SERPL W P-5'-P-CCNC: 22 U/L (ref 5–45)
ATRIAL RATE: 115 BPM
ATRIAL RATE: 197 BPM
BACTERIA UR QL AUTO: ABNORMAL /HPF
BASOPHILS # BLD AUTO: 0.03 THOUSANDS/ΜL (ref 0–0.1)
BASOPHILS NFR BLD AUTO: 0 % (ref 0–1)
BILIRUB DIRECT SERPL-MCNC: 0.25 MG/DL (ref 0–0.2)
BILIRUB SERPL-MCNC: 0.8 MG/DL (ref 0.2–1)
BILIRUB UR QL STRIP: NEGATIVE
BLD GP AB SCN SERPL QL: NEGATIVE
BUN SERPL-MCNC: 13 MG/DL (ref 5–25)
CALCIUM SERPL-MCNC: 9.4 MG/DL (ref 8.3–10.1)
CHLORIDE SERPL-SCNC: 97 MMOL/L (ref 100–108)
CLARITY UR: CLEAR
CO2 SERPL-SCNC: 29 MMOL/L (ref 21–32)
COLOR UR: YELLOW
CREAT SERPL-MCNC: 0.82 MG/DL (ref 0.6–1.3)
EOSINOPHIL # BLD AUTO: 0 THOUSAND/ΜL (ref 0–0.61)
EOSINOPHIL NFR BLD AUTO: 0 % (ref 0–6)
ERYTHROCYTE [DISTWIDTH] IN BLOOD BY AUTOMATED COUNT: 15 % (ref 11.6–15.1)
GFR SERPL CREATININE-BSD FRML MDRD: 61 ML/MIN/1.73SQ M
GLUCOSE SERPL-MCNC: 123 MG/DL (ref 65–140)
GLUCOSE UR STRIP-MCNC: NEGATIVE MG/DL
HCT VFR BLD AUTO: 35 % (ref 34.8–46.1)
HGB BLD-MCNC: 11.9 G/DL (ref 11.5–15.4)
HGB UR QL STRIP.AUTO: ABNORMAL
IMM GRANULOCYTES # BLD AUTO: 0.05 THOUSAND/UL (ref 0–0.2)
IMM GRANULOCYTES NFR BLD AUTO: 1 % (ref 0–2)
INR PPP: 1.08 (ref 0.86–1.17)
INR PPP: 3.18 (ref 0.86–1.17)
KETONES UR STRIP-MCNC: NEGATIVE MG/DL
LEUKOCYTE ESTERASE UR QL STRIP: ABNORMAL
LYMPHOCYTES # BLD AUTO: 0.68 THOUSANDS/ΜL (ref 0.6–4.47)
LYMPHOCYTES NFR BLD AUTO: 7 % (ref 14–44)
MCH RBC QN AUTO: 31.6 PG (ref 26.8–34.3)
MCHC RBC AUTO-ENTMCNC: 34 G/DL (ref 31.4–37.4)
MCV RBC AUTO: 93 FL (ref 82–98)
MONOCYTES # BLD AUTO: 0.47 THOUSAND/ΜL (ref 0.17–1.22)
MONOCYTES NFR BLD AUTO: 5 % (ref 4–12)
NEUTROPHILS # BLD AUTO: 9.11 THOUSANDS/ΜL (ref 1.85–7.62)
NEUTS SEG NFR BLD AUTO: 87 % (ref 43–75)
NITRITE UR QL STRIP: NEGATIVE
NON-SQ EPI CELLS URNS QL MICRO: ABNORMAL /HPF
NRBC BLD AUTO-RTO: 0 /100 WBCS
PH UR STRIP.AUTO: 7 [PH] (ref 4.5–8)
PLATELET # BLD AUTO: 241 THOUSANDS/UL (ref 149–390)
PMV BLD AUTO: 10 FL (ref 8.9–12.7)
POTASSIUM SERPL-SCNC: 3.1 MMOL/L (ref 3.5–5.3)
PROT SERPL-MCNC: 7.6 G/DL (ref 6.4–8.2)
PROT UR STRIP-MCNC: NEGATIVE MG/DL
PROTHROMBIN TIME: 13.9 SECONDS (ref 11.8–14.2)
PROTHROMBIN TIME: 32.1 SECONDS (ref 11.8–14.2)
QRS AXIS: -35 DEGREES
QRS AXIS: -36 DEGREES
QRSD INTERVAL: 102 MS
QRSD INTERVAL: 98 MS
QT INTERVAL: 362 MS
QT INTERVAL: 364 MS
QTC INTERVAL: 445 MS
QTC INTERVAL: 466 MS
RBC # BLD AUTO: 3.77 MILLION/UL (ref 3.81–5.12)
RBC #/AREA URNS AUTO: ABNORMAL /HPF
RH BLD: POSITIVE
SODIUM SERPL-SCNC: 136 MMOL/L (ref 136–145)
SP GR UR STRIP.AUTO: <=1.005 (ref 1–1.03)
SPECIMEN EXPIRATION DATE: NORMAL
T WAVE AXIS: 41 DEGREES
T WAVE AXIS: 54 DEGREES
TROPONIN I SERPL-MCNC: <0.02 NG/ML
UROBILINOGEN UR QL STRIP.AUTO: 0.2 E.U./DL
VENTRICULAR RATE: 100 BPM
VENTRICULAR RATE: 90 BPM
WBC # BLD AUTO: 10.34 THOUSAND/UL (ref 4.31–10.16)
WBC #/AREA URNS AUTO: ABNORMAL /HPF

## 2018-11-12 PROCEDURE — 80076 HEPATIC FUNCTION PANEL: CPT | Performed by: PHYSICIAN ASSISTANT

## 2018-11-12 PROCEDURE — C9113 INJ PANTOPRAZOLE SODIUM, VIA: HCPCS | Performed by: INTERNAL MEDICINE

## 2018-11-12 PROCEDURE — 85610 PROTHROMBIN TIME: CPT | Performed by: PHYSICIAN ASSISTANT

## 2018-11-12 PROCEDURE — 86900 BLOOD TYPING SEROLOGIC ABO: CPT | Performed by: PHYSICIAN ASSISTANT

## 2018-11-12 PROCEDURE — 80048 BASIC METABOLIC PNL TOTAL CA: CPT | Performed by: PHYSICIAN ASSISTANT

## 2018-11-12 PROCEDURE — 93005 ELECTROCARDIOGRAM TRACING: CPT

## 2018-11-12 PROCEDURE — 85730 THROMBOPLASTIN TIME PARTIAL: CPT | Performed by: PHYSICIAN ASSISTANT

## 2018-11-12 PROCEDURE — 96361 HYDRATE IV INFUSION ADD-ON: CPT

## 2018-11-12 PROCEDURE — 99285 EMERGENCY DEPT VISIT HI MDM: CPT

## 2018-11-12 PROCEDURE — 96365 THER/PROPH/DIAG IV INF INIT: CPT

## 2018-11-12 PROCEDURE — 86901 BLOOD TYPING SEROLOGIC RH(D): CPT | Performed by: PHYSICIAN ASSISTANT

## 2018-11-12 PROCEDURE — 86850 RBC ANTIBODY SCREEN: CPT | Performed by: PHYSICIAN ASSISTANT

## 2018-11-12 PROCEDURE — 81001 URINALYSIS AUTO W/SCOPE: CPT | Performed by: PHYSICIAN ASSISTANT

## 2018-11-12 PROCEDURE — 74177 CT ABD & PELVIS W/CONTRAST: CPT

## 2018-11-12 PROCEDURE — 84484 ASSAY OF TROPONIN QUANT: CPT | Performed by: PHYSICIAN ASSISTANT

## 2018-11-12 PROCEDURE — 93010 ELECTROCARDIOGRAM REPORT: CPT | Performed by: INTERNAL MEDICINE

## 2018-11-12 PROCEDURE — 87040 BLOOD CULTURE FOR BACTERIA: CPT | Performed by: PHYSICIAN ASSISTANT

## 2018-11-12 PROCEDURE — 85025 COMPLETE CBC W/AUTO DIFF WBC: CPT | Performed by: PHYSICIAN ASSISTANT

## 2018-11-12 PROCEDURE — 36415 COLL VENOUS BLD VENIPUNCTURE: CPT | Performed by: PHYSICIAN ASSISTANT

## 2018-11-12 PROCEDURE — C9113 INJ PANTOPRAZOLE SODIUM, VIA: HCPCS | Performed by: PHYSICIAN ASSISTANT

## 2018-11-12 RX ORDER — PANTOPRAZOLE SODIUM 40 MG/1
40 INJECTION, POWDER, FOR SOLUTION INTRAVENOUS EVERY 12 HOURS SCHEDULED
Status: DISCONTINUED | OUTPATIENT
Start: 2018-11-12 | End: 2018-11-12

## 2018-11-12 RX ORDER — GABAPENTIN 100 MG/1
100 CAPSULE ORAL DAILY
Status: DISCONTINUED | OUTPATIENT
Start: 2018-11-13 | End: 2018-11-17 | Stop reason: HOSPADM

## 2018-11-12 RX ORDER — PANTOPRAZOLE SODIUM 40 MG/1
40 INJECTION, POWDER, FOR SOLUTION INTRAVENOUS EVERY 12 HOURS SCHEDULED
Status: DISCONTINUED | OUTPATIENT
Start: 2018-11-12 | End: 2018-11-15

## 2018-11-12 RX ORDER — ACETAMINOPHEN 325 MG/1
650 TABLET ORAL EVERY 6 HOURS PRN
Status: DISCONTINUED | OUTPATIENT
Start: 2018-11-12 | End: 2018-11-17 | Stop reason: HOSPADM

## 2018-11-12 RX ORDER — LORATADINE 10 MG/1
10 TABLET ORAL DAILY
COMMUNITY

## 2018-11-12 RX ADMIN — SODIUM CHLORIDE 1000 ML: 0.9 INJECTION, SOLUTION INTRAVENOUS at 17:43

## 2018-11-12 RX ADMIN — SODIUM CHLORIDE 80 MG: 9 INJECTION, SOLUTION INTRAVENOUS at 19:00

## 2018-11-12 RX ADMIN — IOHEXOL 100 ML: 350 INJECTION, SOLUTION INTRAVENOUS at 18:00

## 2018-11-12 RX ADMIN — PANTOPRAZOLE SODIUM 40 MG: 40 INJECTION, POWDER, FOR SOLUTION INTRAVENOUS at 22:52

## 2018-11-12 RX ADMIN — SODIUM CHLORIDE 8 MG/HR: 9 INJECTION, SOLUTION INTRAVENOUS at 19:29

## 2018-11-12 NOTE — ED NOTES
Patient transported to [facility], [address] [place] [address]  11/12/18 2125

## 2018-11-12 NOTE — ED PROVIDER NOTES
History  Chief Complaint   Patient presents with    Rectal Bleeding     Patient presents bloody stools, indigestion, vomiting blood, and blood in her urine  States this has been going on for a few days  79 y/o with melena  Started about 1 week ago  Initially intermittent  Now more frequent  Occurs with each bowel movement today  Feels fatigued  Not lightheaded or dizzy  Mild SOB  No chest pain  Mild epigastric pain  No fevers, chills, n/v  BP running 90 systolic to 528 systolic - which is baseline for her and not lower than usual  Has history of GI bleed 2/2 gastric ulcer  Pt states current symptoms are similar  Epigastric burning pain  Nausea, 1 episode of vomiting  Non-bloody, non-bilious, and not described as coffee ground emesis  History provided by:  Patient   used: No    Black or Bloody Stool   Quality:  Black and tarry  Amount: Moderate  Duration:  1 month  Timing:  Constant  Chronicity:  New  Context: spontaneously    Context: not anal fissures, not anal penetration, not constipation, not defecation, not diarrhea, not foreign body, not hemorrhoids, not rectal injury and not rectal pain    Similar prior episodes: yes    Relieved by:  Nothing  Worsened by:  Nothing  Ineffective treatments:  None tried  Associated symptoms: abdominal pain and vomiting    Associated symptoms: no dizziness, no epistaxis, no fever, no hematemesis, no light-headedness, no loss of consciousness and no recent illness    Risk factors: no anticoagulant use    Risk factors comment:  On plavix      Prior to Admission Medications   Prescriptions Last Dose Informant Patient Reported? Taking?    Ferrous Sulfate (IRON) 325 (65 Fe) MG TABS Unknown at Unknown time  Yes No   Sig: Take by mouth daily     Omega-3 Fatty Acids (FISH OIL) 1200 MG CAPS Unknown at Unknown time  Yes No   Sig: Take by mouth daily     Vitamin D, Cholecalciferol, 1000 units CAPS Unknown at Unknown time  Yes No   Sig: Take by mouth daily atorvastatin (LIPITOR) 40 mg tablet Unknown at Unknown time  No No   Sig: Take 1 tablet (40 mg total) by mouth every evening   calcium carbonate (OS-MAURISIO) 600 MG tablet Unknown at Unknown time  Yes No   Sig: Take 600 mg by mouth daily   clopidogrel (PLAVIX) 75 mg tablet Unknown at Unknown time  Yes No   Sig: Take 75 mg by mouth daily at bedtime     furosemide (LASIX) 40 mg tablet Unknown at Unknown time  Yes No   Sig: Take 40 mg by mouth daily   gabapentin (NEURONTIN) 100 mg capsule Unknown at Unknown time  No No   Sig: Take 1 capsule (100 mg total) by mouth 2 (two) times a day   Patient taking differently: Take 100 mg by mouth daily     loratadine (CLARITIN) 10 mg tablet Unknown at Unknown time  Yes No   Sig: Take 10 mg by mouth daily   nitroglycerin (NITRODUR) 0 2 mg/hr Unknown at Unknown time  Yes No   Sig: Place 1 patch on the skin daily 7 am administration   pantoprazole (PROTONIX) 40 mg tablet Unknown at Unknown time  Yes No   Sig: Take 40 mg by mouth daily   vitamin E, tocopherol, ([employer] VITAMIN E) 1,000 units capsule Unknown at Unknown time  Yes No   Sig: Take 1,000 Units by mouth daily      Facility-Administered Medications: None       Past Medical History:   Diagnosis Date    Anemia     Cardiac disease     GERD (gastroesophageal reflux disease)     Hypertension     Stroke [facility])        Past Surgical History:   Procedure Laterality Date    APPENDECTOMY      CHOLECYSTECTOMY      FRACTURE SURGERY      HYSTERECTOMY         History reviewed  No pertinent family history  I have reviewed and agree with the history as documented  Social History   Substance Use Topics    Smoking status: Never Smoker    Smokeless tobacco: Never Used    Alcohol use No        Review of Systems   Constitutional: Negative for activity change, appetite change, chills, diaphoresis, fatigue, fever and unexpected weight change     HENT: Negative for congestion, nosebleeds, rhinorrhea, sinus pressure, sore throat and trouble swallowing  Eyes: Negative for photophobia and visual disturbance  Respiratory: Negative for apnea, cough, choking, chest tightness, shortness of breath, wheezing and stridor  Cardiovascular: Negative for chest pain, palpitations and leg swelling  Gastrointestinal: Positive for abdominal pain, blood in stool and vomiting  Negative for abdominal distention, constipation, diarrhea, hematemesis and nausea  Genitourinary: Negative for decreased urine volume, difficulty urinating, dysuria, enuresis, flank pain, frequency, hematuria and urgency  Musculoskeletal: Negative for arthralgias, myalgias, neck pain and neck stiffness  Skin: Negative for color change, pallor, rash and wound  Allergic/Immunologic: Negative  Neurological: Negative for dizziness, tremors, loss of consciousness, syncope, weakness, light-headedness, numbness and headaches  Hematological: Negative  Psychiatric/Behavioral: Negative  All other systems reviewed and are negative  Physical Exam  Physical Exam   Constitutional: She is oriented to person, place, and time  She appears well-developed and well-nourished  Non-toxic appearance  She does not have a sickly appearance  She does not appear ill  No distress  HENT:   Head: Normocephalic and atraumatic  Eyes: Pupils are equal, round, and reactive to light  EOM and lids are normal    Neck: Normal range of motion  Neck supple  Cardiovascular: Normal rate, regular rhythm, S1 normal, S2 normal, normal heart sounds, intact distal pulses and normal pulses  Exam reveals no gallop, no distant heart sounds, no friction rub and no decreased pulses  No murmur heard  Pulses:       Radial pulses are 2+ on the right side, and 2+ on the left side  Pulmonary/Chest: Effort normal and breath sounds normal  No accessory muscle usage  No apnea, no tachypnea and no bradypnea  No respiratory distress  She has no decreased breath sounds  She has no wheezes  She has no rhonchi  She has no rales  Abdominal: Soft  Normal appearance  She exhibits no distension  There is no tenderness  There is no rigidity, no rebound and no guarding  Musculoskeletal: Normal range of motion  She exhibits no edema, tenderness or deformity  Neurological: She is alert and oriented to person, place, and time  No cranial nerve deficit  GCS eye subscore is 4  GCS verbal subscore is 5  GCS motor subscore is 6  GCS 15  AAOx3  No focal neuro deficits  CN II-XII grossly intact  Speech normal, no aphasia or dysarthria  No pronator drift  Cerebellar function normal  Finger to nose normal  PERRL  EOMI  Peripheral vision intact  No nystagmus  Upper and lower extremity strength 5/5 through   strength 5/5 b/l  Gross sensation intact b/l  Skin: Skin is warm, dry and intact  No rash noted  She is not diaphoretic  No erythema  No pallor  Psychiatric: Her speech is normal    Nursing note and vitals reviewed        Vital Signs  ED Triage Vitals   Temperature Pulse Respirations Blood Pressure SpO2   11/12/18 1607 11/12/18 1607 11/12/18 1607 11/12/18 1610 11/12/18 1607   98 8 °F (37 1 °C) (!) 108 18 125/65 97 %      Temp Source Heart Rate Source Patient Position - Orthostatic VS BP Location FiO2 (%)   11/12/18 1607 11/12/18 1607 11/12/18 1607 11/12/18 1607 --   Oral Monitor Sitting Left arm       Pain Score       11/12/18 1607       No Pain           Vitals:    11/12/18 2000 11/12/18 2030 11/12/18 2100 11/12/18 2130   BP: 101/58 102/65 115/62 123/66   Pulse: 90 91 91 82   Patient Position - Orthostatic VS: Sitting Sitting Sitting Sitting       Visual Acuity      ED Medications  Medications   pantoprazole (PROTONIX) 80 mg in sodium chloride 0 9 % 100 mL infusion (8 mg/hr Intravenous New Bag 11/12/18 1929)   gabapentin (NEURONTIN) capsule 100 mg (not administered)   pantoprazole (PROTONIX) injection 40 mg (0 mg Intravenous Hold 11/12/18 2147)   acetaminophen (TYLENOL) tablet 650 mg (not administered)   sodium chloride 0 9 % bolus 1,000 mL (0 mL Intravenous Stopped 11/12/18 1845)   iohexol (OMNIPAQUE) 350 MG/ML injection (MULTI-DOSE) 100 mL (100 mL Intravenous Given 11/12/18 1800)   pantoprazole (PROTONIX) 80 mg in sodium chloride 0 9 % 100 mL IVPB (0 mg Intravenous Stopped 11/12/18 1929)       Diagnostic Studies  Results Reviewed     Procedure Component Value Units Date/Time    Urine Microscopic [[id number]]  (Abnormal) Collected:  11/12/18 2132    Lab Status:  Final result Specimen:  Urine from Urine, Clean Catch Updated:  11/12/18 2147     RBC, UA 0-1 (A) /hpf      WBC, UA 1-2 (A) /hpf      Epithelial Cells Occasional /hpf      Bacteria, UA Occasional /hpf     UA w Reflex to Microscopic w Reflex to Culture [[id number]]  (Abnormal) Collected:  11/12/18 2132    Lab Status:  Final result Specimen:  Urine from Urine, Clean Catch Updated:  11/12/18 2139     Color, UA Yellow     Clarity, UA Clear     Specific Gravity, UA <=1 005     pH, UA 7 0     Leukocytes, UA Small (A)     Nitrite, UA Negative     Protein, UA Negative mg/dl      Glucose, UA Negative mg/dl      Ketones, UA Negative mg/dl      Urobilinogen, UA 0 2 E U /dl      Bilirubin, UA Negative     Blood, UA Small (A)    Hemoglobin [[id number]]     Lab Status:  No result Specimen:  Blood     Protime-INR [[id number]]  (Normal) Collected:  11/12/18 1846    Lab Status:  Final result Specimen:  Blood from Arm, Right Updated:  11/12/18 1908     Protime 13 9 seconds      INR 1 08    APTT [[id number]]  (Normal) Collected:  11/12/18 1846    Lab Status:  Final result Specimen:  Blood from Arm, Right Updated:  11/12/18 1908     PTT 33 seconds     APTT [[id number]]  (Abnormal) Collected:  11/12/18 1741    Lab Status:  Final result Specimen:  Blood from Arm, Right Updated:  11/12/18 1836     PTT >210 (HH) seconds     Hepatic function panel [[id number]]  (Abnormal) Collected:  11/12/18 1741    Lab Status:  Final result Specimen:  Blood from Arm, Right Updated:  11/12/18 1827     Total Bilirubin 0 80 mg/dL      Bilirubin, Direct 0 25 (H) mg/dL      Alkaline Phosphatase 95 U/L      AST 22 U/L      ALT 23 U/L      Total Protein 7 6 g/dL      Albumin 3 6 g/dL     Basic metabolic panel [[id number]]  (Abnormal) Collected:  11/12/18 1741    Lab Status:  Final result Specimen:  Blood from Arm, Right Updated:  11/12/18 1827     Sodium 136 mmol/L      Potassium 3 1 (L) mmol/L      Chloride 97 (L) mmol/L      CO2 29 mmol/L      ANION GAP 10 mmol/L      BUN 13 mg/dL      Creatinine 0 82 mg/dL      Glucose 123 mg/dL      Calcium 9 4 mg/dL      eGFR 61 ml/min/1 73sq m     Narrative:         National Kidney Disease Education Program recommendations are as follows:  GFR calculation is accurate only with a steady state creatinine  Chronic Kidney disease less than 60 ml/min/1 73 sq  meters  Kidney failure less than 15 ml/min/1 73 sq  meters      Troponin I [[id number]]  (Normal) Collected:  11/12/18 1741    Lab Status:  Final result Specimen:  Blood from Arm, Right Updated:  11/12/18 1826     Troponin I <0 02 ng/mL     Protime-INR [[id number]]  (Abnormal) Collected:  11/12/18 1741    Lab Status:  Final result Specimen:  Blood from Arm, Right Updated:  11/12/18 1817     Protime 32 1 (H) seconds      INR 3 18 (H)    CBC and differential [[id number]]  (Abnormal) Collected:  11/12/18 1741    Lab Status:  Final result Specimen:  Blood from Arm, Right Updated:  11/12/18 1755     WBC 10 34 (H) Thousand/uL      RBC 3 77 (L) Million/uL      Hemoglobin 11 9 g/dL      Hematocrit 35 0 %      MCV 93 fL      MCH 31 6 pg      MCHC 34 0 g/dL      RDW 15 0 %      MPV 10 0 fL      Platelets 448 Thousands/uL      nRBC 0 /100 WBCs      Neutrophils Relative 87 (H) %      Immat GRANS % 1 %      Lymphocytes Relative 7 (L) %      Monocytes Relative 5 %      Eosinophils Relative 0 %      Basophils Relative 0 %      Neutrophils Absolute 9 11 (H) Thousands/µL      Immature Grans Absolute 0 05 Thousand/uL      Lymphocytes Absolute 0 68 Thousands/µL      Monocytes Absolute 0 47 Thousand/µL      Eosinophils Absolute 0 00 Thousand/µL      Basophils Absolute 0 03 Thousands/µL     Blood culture #1 [[id number]] Collected:  11/12/18 1720    Lab Status: In process Specimen:  Blood from Arm, Left Updated:  11/12/18 1751    Blood culture #2 [[id number]] Collected:  11/12/18 1741    Lab Status: In process Specimen:  Blood from Arm, Right Updated:  11/12/18 1751                 CT abdomen pelvis with contrast   Final Result by Fernandez Sherman MD (11/12 1834)      1  Decompressed stomach limiting evaluation  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  2   Diverticulosis without evidence of diverticulitis, colitis or bowel obstruction  3   Chronic L5 compression fracture  Workstation performed: [id number]                    Procedures  Procedures       Phone Contacts  ED Phone Contact    ED Course         HEART Risk Score      Most Recent Value   History  0 Filed at: 11/12/2018 2152   ECG  1 Filed at: 11/12/2018 2152   Age  2 Filed at: 11/12/2018 2152   Risk Factors  1 Filed at: 11/12/2018 2152   Troponin  0 Filed at: 11/12/2018 2152   Heart Score Risk Calculator   History  0 Filed at: 11/12/2018 2152   ECG  1 Filed at: 11/12/2018 2152   Age  2 Filed at: 11/12/2018 2152   Risk Factors  1 Filed at: 11/12/2018 2152   Troponin  0 Filed at: 11/12/2018 2152   HEART Score  4 Filed at: 11/12/2018 2152   HEART Score  4 Filed at: 11/12/2018 2152        Identification of Seniors at Risk      Most Recent Value   (ISAR) Identification of Seniors at Risk   Before the illness or injury that brought you to the Emergency, did you need someone to help you on a regular basis? 1 Filed at: 11/12/2018 1609   In the last 24 hours, have you needed more help than usual?  1 Filed at: 11/12/2018 1609   Have you been hospitalized for one or more nights during the past 6 months?   1 Filed at: 11/12/2018 1609   In general, do you see well?  1 Filed at: 11/12/2018 1609   In general, do you have serious problems with your memory? 1 Filed at: 11/12/2018 1609   Do you take more than three different medications every day? 1 Filed at: 11/12/2018 1609   ISAR Score  6 Filed at: 11/12/2018 1609                    Initial Sepsis Screening     Row Name 11/12/18 2006                Is the patient's history suggestive of a new or worsening infection? No  -CE        Suspected source of infection          Are two or more of the following signs & symptoms of infection both present and new to the patient? No  -CE        Indicate SIRS criteria          If the answer is yes to both questions, suspicion of sepsis is present          If severe sepsis is present AND tissue hypoperfusion perists in the hour after fluid resuscitation or lactate > 4, the patient meets criteria for SEPTIC SHOCK          Are any of the following organ dysfunction criteria present within 6 hours of suspected infection and SIRS criteria that are NOT considered to be chronic conditions?         Organ dysfunction          Date of presentation of severe sepsis          Time of presentation of severe sepsis          Tissue hypoperfusion persists in the hour after crystalloid fluid administration, evidenced, by either:          Was hypotension present within one hour of the conclusion of crystalloid fluid administration?         Date of presentation of septic shock          Time of presentation of septic shock            User Key  (r) = Recorded By, (t) = Taken By, (c) = Cosigned By    [address] Name Provider Jin Cole MD Physician                  MDM  Number of Diagnoses or Management Options  GI bleed: new and requires workup  Diagnosis management comments: Differential diagnosis including but not limited to: upper GI bleed, esophageal varices, gastritis, PUD, diverticulosis, AVM, hemorrhoids, anemia, coagulopathy, liver disease, tumor, anal fissure          Amount and/or Complexity of Data Reviewed  Clinical lab tests: ordered and reviewed  Tests in the radiology section of CPT®: ordered and reviewed  Independent visualization of images, tracings, or specimens: yes    Risk of Complications, Morbidity, and/or Mortality  Presenting problems: moderate  Management options: low  General comments: 55-year-old female with melena  Heme-positive stool  History exam consistent with GI bleed, likely upper GI bleed  She was started on Protonix  She is hemodynamically stable  PT INR were abnormal, redraw performed and within normal   This was likely laboratory error  She is not on any anticoagulants, no Coumadin  She does take Plavix  At this point time I do not feel patient needs transfusion, she has normal hemoglobin, hemodynamically stable  No active hemorrhaging  Discussed with Internal Medicine who agrees with plan  She is currently in atrial fibrillation, will defer heparinization given her active melena  Patient Progress  Patient progress: stable    CritCare Time    Disposition  Final diagnoses:   GI bleed     Time reflects when diagnosis was documented in both MDM as applicable and the Disposition within this note     Time User Action Codes Description Comment    11/12/2018  8:08 PM Janak Spencer Add [K92 2] GI bleed       ED Disposition     ED Disposition Condition Comment    Admit  Case was discussed with Dr Seth Grimm and the patient's admission status was agreed to be Admission Status: inpatient status to the service of Dr Seth Grimm  Follow-up Information    None         Patient's Medications   Discharge Prescriptions    No medications on file     No discharge procedures on file      ED Provider  Electronically Signed by           Luis Antonio Chaney PA-C  11/12/18 4149

## 2018-11-13 LAB
ANION GAP SERPL CALCULATED.3IONS-SCNC: 8 MMOL/L (ref 4–13)
BUN SERPL-MCNC: 12 MG/DL (ref 5–25)
CALCIUM SERPL-MCNC: 9.4 MG/DL (ref 8.3–10.1)
CHLORIDE SERPL-SCNC: 101 MMOL/L (ref 100–108)
CO2 SERPL-SCNC: 31 MMOL/L (ref 21–32)
CREAT SERPL-MCNC: 0.82 MG/DL (ref 0.6–1.3)
GFR SERPL CREATININE-BSD FRML MDRD: 61 ML/MIN/1.73SQ M
GLUCOSE SERPL-MCNC: 98 MG/DL (ref 65–140)
HGB BLD-MCNC: 11.3 G/DL (ref 11.5–15.4)
HGB BLD-MCNC: 11.4 G/DL (ref 11.5–15.4)
HGB BLD-MCNC: 12.2 G/DL (ref 11.5–15.4)
MAGNESIUM SERPL-MCNC: 2.1 MG/DL (ref 1.6–2.6)
POTASSIUM SERPL-SCNC: 3.2 MMOL/L (ref 3.5–5.3)
SODIUM SERPL-SCNC: 140 MMOL/L (ref 136–145)

## 2018-11-13 PROCEDURE — 99222 1ST HOSP IP/OBS MODERATE 55: CPT | Performed by: INTERNAL MEDICINE

## 2018-11-13 PROCEDURE — 80048 BASIC METABOLIC PNL TOTAL CA: CPT | Performed by: INTERNAL MEDICINE

## 2018-11-13 PROCEDURE — C9113 INJ PANTOPRAZOLE SODIUM, VIA: HCPCS | Performed by: INTERNAL MEDICINE

## 2018-11-13 PROCEDURE — 85018 HEMOGLOBIN: CPT | Performed by: INTERNAL MEDICINE

## 2018-11-13 PROCEDURE — 83735 ASSAY OF MAGNESIUM: CPT | Performed by: GENERAL PRACTICE

## 2018-11-13 PROCEDURE — 36415 COLL VENOUS BLD VENIPUNCTURE: CPT | Performed by: INTERNAL MEDICINE

## 2018-11-13 PROCEDURE — 99232 SBSQ HOSP IP/OBS MODERATE 35: CPT | Performed by: GENERAL PRACTICE

## 2018-11-13 RX ORDER — SODIUM CHLORIDE 9 MG/ML
500 INJECTION, SOLUTION INTRAVENOUS ONCE
Status: COMPLETED | OUTPATIENT
Start: 2018-11-13 | End: 2018-11-13

## 2018-11-13 RX ORDER — POTASSIUM CHLORIDE 14.9 MG/ML
20 INJECTION INTRAVENOUS ONCE
Status: COMPLETED | OUTPATIENT
Start: 2018-11-13 | End: 2018-11-13

## 2018-11-13 RX ADMIN — ACETAMINOPHEN 650 MG: 325 TABLET, FILM COATED ORAL at 18:06

## 2018-11-13 RX ADMIN — POTASSIUM CHLORIDE 20 MEQ: 14.9 INJECTION, SOLUTION INTRAVENOUS at 14:10

## 2018-11-13 RX ADMIN — PANTOPRAZOLE SODIUM 40 MG: 40 INJECTION, POWDER, FOR SOLUTION INTRAVENOUS at 09:09

## 2018-11-13 RX ADMIN — PANTOPRAZOLE SODIUM 40 MG: 40 INJECTION, POWDER, FOR SOLUTION INTRAVENOUS at 22:12

## 2018-11-13 RX ADMIN — SODIUM CHLORIDE 500 ML/HR: 0.9 INJECTION, SOLUTION INTRAVENOUS at 13:35

## 2018-11-13 RX ADMIN — GABAPENTIN 100 MG: 100 CAPSULE ORAL at 09:09

## 2018-11-13 RX ADMIN — POTASSIUM CHLORIDE 20 MEQ: 200 INJECTION, SOLUTION INTRAVENOUS at 11:43

## 2018-11-13 NOTE — PROGRESS NOTES
[facility] Progress Note  Patient: Tata Ji 80 y o  female   MRN: [id number]  PCP: Priti Jones DO  Unit/Bed#: ED 17 Encounter: [id number]  Date Of Visit: 18    Assessment:    Principal Problem:    GI bleed  Active Problems:    Dementia without behavioral disturbance    Dyslipidemia      Plan:    * GI bleed   Assessment & Plan     -npo will advance to clear liquid diet  -Hb stable  -GI consult appreciated will manage conservatively  -IV PPI      Dyslipidemia   Assessment & Plan     Resume home meds as tolerated      Dementia without behavioral disturbance   Assessment & Plan     -fall precautions  -supportive care        Atrial fibrillation-new onset-cardiology consult      Hemorrhagic disorder evidenced by GI bleed,reported hematemesis and hematuria due to Plavix in a 79 yo with TIA and Afib ,requiring GI consult and withholding  of plavix    VTE Pharmacologic Prophylaxis:   Pharmacologic: Pharmacologic VTE Prophylaxis contraindicated due to gi bleed  Mechanical VTE Prophylaxis in Place: Yes    Patient Centered Rounds: I have performed bedside rounds with nursing staff today  Discussions with Specialists or Other Care Team Provider:     Education and Discussions with Family / Patient:     Time Spent for Care: 30 minutes  More than 50% of total time spent on counseling and coordination of care as described above      Current Length of Stay: 1 day(s)    Current Patient Status: Inpatient   Certification Statement: The patient will continue to require additional inpatient hospital stay due to gi bleed    Discharge Plan: home    Code Status: Level 3 - DNAR and DNI      Subjective:   No c/o-oriented to person    Objective:     Vitals:   Temp (24hrs), Av 7 °F (37 1 °C), Min:98 5 °F (36 9 °C), Max:98 8 °F (37 1 °C)    Temp:  [98 5 °F (36 9 °C)-98 8 °F (37 1 °C)] 98 5 °F (36 9 °C)  HR:  [] 96  Resp:  [12-24] 16  BP: (101-125)/(58-81) 124/62  SpO2:  [94 %-99 %] 96 %  Body mass index is 25 83 kg/m²  Input and Output Summary (last 24 hours): Intake/Output Summary (Last 24 hours) at 11/13/18 1117  Last data filed at 11/12/18 2248   Gross per 24 hour   Intake          1133 17 ml   Output                0 ml   Net          1133 17 ml       Physical Exam:     Physical Exam   HENT:   Head: Normocephalic and atraumatic  Eyes: Pupils are equal, round, and reactive to light  Cardiovascular:   irreg irreg rate 70-80's   Pulmonary/Chest: Effort normal and breath sounds normal    Abdominal: Soft  Bowel sounds are normal    Musculoskeletal: She exhibits edema  Additional Data:     Labs:      Results from last 7 days  Lab Units 11/13/18  0532  11/12/18  1741   WBC Thousand/uL  --   --  10 34*   HEMOGLOBIN g/dL 11 3*  < > 11 9   HEMATOCRIT %  --   --  35 0   PLATELETS Thousands/uL  --   --  241   NEUTROS PCT %  --   --  87*   LYMPHS PCT %  --   --  7*   MONOS PCT %  --   --  5   EOS PCT %  --   --  0   < > = values in this interval not displayed  Results from last 7 days  Lab Units 11/13/18  0532 11/12/18  1741   POTASSIUM mmol/L 3 2* 3 1*   CHLORIDE mmol/L 101 97*   CO2 mmol/L 31 29   BUN mg/dL 12 13   CREATININE mg/dL 0 82 0 82   CALCIUM mg/dL 9 4 9 4   ALK PHOS U/L  --  95   ALT U/L  --  23   AST U/L  --  22       Results from last 7 days  Lab Units 11/12/18  1846   INR  1 08       * I Have Reviewed All Lab Data Listed Above  * Additional Pertinent Lab Tests Reviewed:  All Labs Within Last 24 Hours Reviewed    Imaging:    Imaging Reports Reviewed Today Include:   Imaging Personally Reviewed by Myself Includes:      Recent Cultures (last 7 days):           Last 24 Hours Medication List:     Current Facility-Administered Medications:  acetaminophen 650 mg Oral Q6H PRN Sirisha Lew MD   gabapentin 100 mg Oral Daily Sirisha Lew MD   pantoprazole 40 mg Intravenous Q12H Alon Soni MD        Today, Patient Was Seen By: Bhavya Haro MD    ** Please Note: Dragon 360 Dictation voice to text software may have been used in the creation of this document   **

## 2018-11-13 NOTE — PLAN OF CARE
CARDIOVASCULAR - ADULT     Maintains optimal cardiac output and hemodynamic stability Progressing     Absence of cardiac dysrhythmias or at baseline rhythm Progressing        DISCHARGE PLANNING     Discharge to home or other facility with appropriate resources Progressing        GASTROINTESTINAL - ADULT     Minimal or absence of nausea and/or vomiting Progressing     Maintains or returns to baseline bowel function Progressing     Maintains adequate nutritional intake Progressing     Establish and maintain optimal ostomy function Progressing        INFECTION - ADULT     Absence or prevention of progression during hospitalization Progressing     Absence of fever/infection during neutropenic period Progressing        Knowledge Deficit     Patient/family/caregiver demonstrates understanding of disease process, treatment plan, medications, and discharge instructions Progressing        PAIN - ADULT     Verbalizes/displays adequate comfort level or baseline comfort level Progressing        Potential for Falls     Patient will remain free of falls Progressing        Prexisting or High Potential for Compromised Skin Integrity     Skin integrity is maintained or improved Progressing        SAFETY ADULT     Maintain or return to baseline ADL function Progressing     Maintain or return mobility status to optimal level Progressing     Patient will remain free of falls Progressing

## 2018-11-13 NOTE — ED NOTES
Pt continues to await room assignment  Son bedside  Pt is A+Ox4   resp unlabored     Mary Ann Jauregui RN  11/12/18 7418

## 2018-11-13 NOTE — H&P
H&P- Calvin Hernandez 7/21/1923, 80 y o  female MRN: [id number]    Unit/Bed#: ED 17 Encounter: [id number]    Primary Care Provider: Nallely Mckee DO   Date and time admitted to hospital: 11/12/2018  4:44 PM        * GI bleed   Assessment & Plan    -npo  -Hb monitoring  -transfuse prn  -GI consult  -IV PPI     Dyslipidemia   Assessment & Plan    Hold cholesterol medication while NPO  -restart once cleared for diet by GI     Dementia without behavioral disturbance   Assessment & Plan    -fall precautions  -supportive care       VTE Prophylaxis: Pharmacologic VTE Prophylaxis contraindicated due to bleed  / sequential compression device   Code Status: DNR/DNI  POLST: There is no POLST form on file for this patient (pre-hospital)  Discussion with family: son at bedside    Anticipated Length of Stay:  Patient will be admitted on an Inpatient basis with an anticipated length of stay of  > 2 midnights  Justification for Hospital Stay: melena    Total Time for Visit, including Counseling / Coordination of Care: 45 minutes  Greater than 50% of this total time spent on direct patient counseling and coordination of care  Chief Complaint:   GI bleed with melena    History of Present Illness:    Calvin Hernandez is a 80 y o  female who presents with a few day history of melanotic stools, as well as 1 episode of hematemesis and a question of hematuria  Patient is demented and her son is at bedside to assist with the history, so this history is mostly from the son as the patient herself is relatively confused  He reports that she was compliant with her Plavix therapy for TIAs, and has had no recent medication dietary changes  Patient herself denies abdominal pain chest pain she currently denies nausea denies fevers chills  Review of Systems:    Review of Systems   Constitutional: Negative  Negative for activity change, appetite change, chills, diaphoresis, fatigue, fever and unexpected weight change  HENT: Negative  Negative for congestion, dental problem, facial swelling, hearing loss, rhinorrhea, sinus pain, sinus pressure, sore throat and tinnitus  Eyes: Negative  Negative for photophobia, pain, discharge and visual disturbance  Respiratory: Negative  Negative for cough, chest tightness, shortness of breath, wheezing and stridor  Cardiovascular: Negative  Negative for chest pain, palpitations and leg swelling  Gastrointestinal: Positive for nausea and vomiting  Negative for abdominal distention, abdominal pain, blood in stool, constipation and diarrhea  Endocrine: Negative  Negative for cold intolerance, heat intolerance and polyuria  Genitourinary: Positive for hematuria  Negative for difficulty urinating, dysuria, flank pain, frequency and urgency  Musculoskeletal: Negative  Negative for arthralgias, gait problem, joint swelling, myalgias and neck stiffness  Skin: Negative  Negative for color change, pallor, rash and wound  Allergic/Immunologic: Negative  Negative for immunocompromised state  Neurological: Negative  Negative for dizziness, seizures, syncope, weakness, light-headedness, numbness and headaches  Hematological: Negative  Negative for adenopathy  Does not bruise/bleed easily  Psychiatric/Behavioral: Negative  Negative for confusion and hallucinations  The patient is not nervous/anxious  Past Medical and Surgical History:     Past Medical History:   Diagnosis Date    Anemia     Cardiac disease     GERD (gastroesophageal reflux disease)     Hypertension     Stroke [facility])        Past Surgical History:   Procedure Laterality Date    APPENDECTOMY      CHOLECYSTECTOMY      FRACTURE SURGERY      HYSTERECTOMY         Meds/Allergies:    Prior to Admission medications    Medication Sig Start Date End Date Taking?  Authorizing Provider   atorvastatin (LIPITOR) 40 mg tablet Take 1 tablet (40 mg total) by mouth every evening 7/3/18   MANUEL Dorsey   calcium carbonate (OS-MAURISIO) 600 MG tablet Take 600 mg by mouth daily    Historical Provider, MD   clopidogrel (PLAVIX) 75 mg tablet Take 75 mg by mouth daily at bedtime      Historical Provider, MD   Ferrous Sulfate (IRON) 325 (65 Fe) MG TABS Take by mouth daily      Historical Provider, MD   furosemide (LASIX) 40 mg tablet Take 40 mg by mouth daily    Historical Provider, MD   gabapentin (NEURONTIN) 100 mg capsule Take 1 capsule (100 mg total) by mouth 2 (two) times a day  Patient taking differently: Take 100 mg by mouth daily   7/3/18   MANUEL Moreno   loratadine (CLARITIN) 10 mg tablet Take 10 mg by mouth daily    Historical Provider, MD   nitroglycerin (NITRODUR) 0 2 mg/hr Place 1 patch on the skin daily 7 am administration 9/17/18   Historical Provider, MD   Omega-3 Fatty Acids (FISH OIL) 1200 MG CAPS Take by mouth daily      Historical Provider, MD   pantoprazole (PROTONIX) 40 mg tablet Take 40 mg by mouth daily    Historical Provider, MD   Vitamin D, Cholecalciferol, 1000 units CAPS Take by mouth daily      Historical Provider, MD   vitamin E, tocopherol, (CVS VITAMIN E) 1,000 units capsule Take 1,000 Units by mouth daily    Historical Provider, MD     I have reviewed home medications using allscripts  Allergies: No Known Allergies    Social History:     Marital Status:     Occupation:  Retired  Patient Pre-hospital Living Situation:  Independent with assistance of adult son  Patient Pre-hospital Level of Mobility:  Ambulatory  Patient Pre-hospital Diet Restrictions:  None  Substance Use History:   History   Alcohol Use No     History   Smoking Status    Never Smoker   Smokeless Tobacco    Never Used     History   Drug Use No       Family History:    non-contributory    Physical Exam:     Vitals:   Blood Pressure: 102/65 (11/12/18 2030)  Pulse: 91 (11/12/18 2030)  Temperature: 98 8 °F (37 1 °C) (11/12/18 1607)  Temp Source: Oral (11/12/18 1607)  Respirations: 14 (11/12/18 2030)  Height: 4' 11" (149 9 cm) (11/12/18 1607)  Weight - Scale: 58 kg (127 lb 13 9 oz) (11/12/18 1607)  SpO2: 94 % (11/12/18 2030)    Physical Exam   Constitutional: She appears well-developed and well-nourished  No distress  HENT:   Head: Normocephalic and atraumatic  Right Ear: External ear normal    Left Ear: External ear normal    Nose: Nose normal    Mouth/Throat: Oropharynx is clear and moist  No oropharyngeal exudate (right parotid mass)  Eyes: Pupils are equal, round, and reactive to light  Conjunctivae and EOM are normal  Right eye exhibits no discharge  Left eye exhibits no discharge  No scleral icterus  Neck: Normal range of motion  Neck supple  No JVD present  No tracheal deviation present  No thyromegaly present  Cardiovascular: Normal rate, regular rhythm, normal heart sounds and intact distal pulses  Exam reveals no gallop and no friction rub  No murmur heard  Pulmonary/Chest: Breath sounds normal  No stridor  No respiratory distress  She has no wheezes  She has no rales  Abdominal: Soft  Bowel sounds are normal  She exhibits no distension and no mass  There is tenderness (diffusely tender but soft abdomen +hyperactive BS)  There is no rebound and no guarding  Musculoskeletal: Normal range of motion  She exhibits no edema, tenderness or deformity  Neurological: She is alert  No cranial nerve deficit  Coordination normal    Skin: Skin is warm and dry  No rash noted  She is not diaphoretic  No erythema  No pallor  Psychiatric: She has a normal mood and affect  Her behavior is normal  Judgment and thought content normal    Nursing note and vitals reviewed  Additional Data:     Lab Results: I have personally reviewed pertinent reports          Results from last 7 days  Lab Units 11/12/18  1741   WBC Thousand/uL 10 34*   HEMOGLOBIN g/dL 11 9   HEMATOCRIT % 35 0   PLATELETS Thousands/uL 241   NEUTROS ABS Thousands/µL 9 11*   NEUTROS PCT % 87*   LYMPHS PCT % 7*   MONOS PCT % 5   EOS PCT % 0       Results from last 7 days  Lab Units 11/12/18  1741   POTASSIUM mmol/L 3 1*   CHLORIDE mmol/L 97*   CO2 mmol/L 29   BUN mg/dL 13   CREATININE mg/dL 0 82   ANION GAP mmol/L 10   CALCIUM mg/dL 9 4   ALBUMIN g/dL 3 6   TOTAL BILIRUBIN mg/dL 0 80   ALK PHOS U/L 95   ALT U/L 23   AST U/L 22       Results from last 7 days  Lab Units 11/12/18  1846   INR  1 08                   Imaging: I have personally reviewed pertinent reports  CT abdomen pelvis with contrast   Final Result by Tamika Morris MD (34/06 2240)      1  Decompressed stomach limiting evaluation  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

## 2018-11-13 NOTE — UTILIZATION REVIEW
Initial Clinical Review    Admission: Date/Time/Statement: 11/12/18 @ 2010     Orders Placed This Encounter   Procedures    Inpatient Admission (expected length of stay for this patient is greater than two midnights)     Standing Status:   Standing     Number of Occurrences:   1     Order Specific Question:   Admitting Physician     Answer:   Pierce Mullen [[id number]]     Order Specific Question:   Level of Care     Answer:   Med Surg [16]     Order Specific Question:   Estimated length of stay     Answer:   More than 2 Midnights     Order Specific Question:   Certification     Answer:   I certify that inpatient services are medically necessary for this patient for a duration of greater than two midnights  See H&P and MD Progress Notes for additional information about the patient's course of treatment   Inpatient Admission     Standing Status:   Standing     Number of Occurrences:   1     Order Specific Question:   Admitting Physician     Answer:   Pierce Mullen [[id number]]     Order Specific Question:   Level of Care     Answer:   Med Surg [16]     Order Specific Question:   Estimated length of stay     Answer:   More than 2 Midnights     Order Specific Question:   Certification     Answer:   I certify that inpatient services are medically necessary for this patient for a duration of greater than two midnights  See H&P and MD Progress Notes for additional information about the patient's course of treatment  ED: Date/Time/Mode of Arrival:   ED Arrival Information     Expected Arrival Acuity Means of Arrival Escorted By Service Admission Type    - 11/12/2018 15:56 [facility]gent [facility] Emergency    Arrival Complaint    blood in urine          Chief Complaint:   Chief Complaint   Patient presents with    Rectal Bleeding     Patient presents bloody stools, indigestion, vomiting blood, and blood in her urine  States this has been going on for a few days          History of Illness: 81 yo female to ED  w/o c/o hematemesis and questionable hematuria   Most of hx from son , pt w/ dementia   PE : There is tenderness (diffusely tender but soft abdomen +hyperactive BS)  ED Vital Signs:   ED Triage Vitals   Temperature Pulse Respirations Blood Pressure SpO2   11/12/18 1607 11/12/18 1607 11/12/18 1607 11/12/18 1610 11/12/18 1607   98 8 °F (37 1 °C) (!) 108 18 125/65 97 %      Temp Source Heart Rate Source Patient Position - Orthostatic VS BP Location FiO2 (%)   11/12/18 1607 11/12/18 1607 11/12/18 1607 11/12/18 1607 --   Oral Monitor Sitting Left arm       Pain Score       11/12/18 1607       No Pain        Wt Readings from Last 1 Encounters:   11/12/18 58 kg (127 lb 13 9 oz)       Vital Signs (abnormal):wnl     Abnormal Labs/Diagnostic Test Results: ptt > 210 , direct bili  0 25, K  3 1, cl 97  Pt inr  32 1  3 18, wbc 10 34  H&H   11 9  35 0  CT abd- 1   Decompressed stomach limiting evaluation   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  2   Diverticulosis without evidence of diverticulitis, colitis or bowel obstruction  3   Chronic L5 compression fracture    EKG- a-fib     ED Treatment:   Medication Administration from 11/12/2018 1556 to 11/13/2018 1035       Date/Time Order Dose Route Action Action by Comments     11/12/2018 1845 sodium chloride 0 9 % bolus 1,000 mL 0 mL Intravenous Stopped Rojelio Hernandez RN      11/12/2018 1743 sodium chloride 0 9 % bolus 1,000 mL 1,000 mL Intravenous Silviotisael 37 Rojelio Hernandez RN      11/12/2018 1800 iohexol (OMNIPAQUE) 350 MG/ML injection (MULTI-DOSE) 100 mL 100 mL Intravenous Given Darrell Zhou      11/12/2018 1929 pantoprazole (PROTONIX) 80 mg in sodium chloride 0 9 % 100 mL IVPB 0 mg Intravenous Stopped Rojelio Hernandez RN      11/12/2018 1900 pantoprazole (PROTONIX) 80 mg in sodium chloride 0 9 % 100 mL IVPB 80 mg Intravenous New Bag Rjoelio Hernandez RN      11/12/2018 2248 pantoprazole (PROTONIX) 80 mg in sodium chloride 0 9 % 100 mL infusion 0 mg/hr Intravenous Stopped Aretha Rosales RN      11/12/2018 1929 pantoprazole (PROTONIX) 80 mg in sodium chloride 0 9 % 100 mL infusion 8 mg/hr Intravenous New Bag Aretha Rosales RN      11/13/2018 0891 gabapentin (NEURONTIN) capsule 100 mg 100 mg Oral Given Nayeli Renee RN      11/12/2018 2147 pantoprazole (PROTONIX) injection 40 mg 0 mg Intravenous Hold Aretha Roslaes RN 80mg IVP admin in ED per PA order and protonix IV drip currently infusing     11/13/2018 0909 pantoprazole (PROTONIX) injection 40 mg 40 mg Intravenous Given Nayeli Renee RN      11/12/2018 2252 pantoprazole (PROTONIX) injection 40 mg 40 mg Intravenous Given Aretha Rosales RN           Past Medical/Surgical History: Active Ambulatory Problems     Diagnosis Date Noted    TIA (transient ischemic attack) 06/30/2018    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    Dementia without behavioral disturbance 06/30/2018    Acute cystitis without hematuria 09/17/2018    Dyslipidemia 09/17/2018    Gastroesophageal reflux disease without esophagitis 09/17/2018    Bilateral hearing loss 09/17/2018    Lower leg edema 09/17/2018     Past Medical History:   Diagnosis Date    Anemia     Cardiac disease     GERD (gastroesophageal reflux disease)     Hypertension     Stroke [facility])        Admitting Diagnosis: Rectal bleeding [K62 5]    Age/Sex: 80 y o  female    Assessment/Plan: 81 yo female admitted form home w/ GI bleed will make NPO , hgb monitoring , transfuse prn , GI consult, iv PPI   Dyslipidemia- hold chol meds while NPO   Dementia fall precautions , supportive care   Anticipated Length of Stay:  Patient will be admitted on an Inpatient basis with an anticipated length of stay of  > 2 midnights     Justification for Hospital Stay: melena     Admission Orders:  Scheduled Meds:   Current Facility-Administered Medications:  acetaminophen 650 mg Oral Q6H PRN    gabapentin 100 mg Oral Daily    pantoprazole 40 mg Intravenous Q12H [address]      Tele   I&O   SCD  GI consult   NPO  Bedrest   11/13 hgb , bmp   0009   hgb  11 4   0532 11 3

## 2018-11-13 NOTE — CONSULTS
Consultation - [facility]  Onofre Macias 80 y o  female MRN: [id number]  Unit/Bed#: ED 17 Encounter: [id number]         Reason for Consult / Principal Problem:  Reported melena    HPI: Sydnee Pacheco is a 17-year-old female with history of TIA most recently in July on Plavix, dementia, GERD, previous gastric ulcer, and massive the parotid gland  Patient is a somewhat poor historian  I spoke to the patient's son who is her primary caretaker over the phone  Patient's son reports that she had started dark stools at home  Per patient, the stools were dark gray in color  Patient's son reports that she had a GI bleed in the past secondary to an ulcer about 10-15 years ago  Patient and patient's son deny hematemesis or coffee-ground emesis  They denied hematochezia  She denies epigastric pain currently, however her son does report that she was complaining of epigastric pain  On admission, the patient's hemoglobin at 11 9  No associated elevation in BUN  Patient noted to be heme-positive in the emergency room  CT of the abdomen and contrast revealing a diverticulum and duodenum without evidence of duodenitis  Diverticulosis noted as well  Otherwise, no acute findings  She is currently in atrial fibrillation  Per patient's son, she had an EGD over 10-15 years ago when she had a gastric ulcer  He is unsure of her last colonoscopy  Review of Systems:    CONSTITUTIONAL: Denies any fever, chills, or rigors  Good appetite, and no recent weight loss  HEENT: No earache or tinnitus  Denies hearing loss or visual disturbances  CARDIOVASCULAR: No chest pain or palpitations  RESPIRATORY: Denies any cough, hemoptysis, shortness of breath or dyspnea on exertion  GASTROINTESTINAL: As noted in the History of Present Illness  GENITOURINARY: No problems with urination  Denies any hematuria or dysuria  NEUROLOGIC: No dizziness or vertigo, denies headaches     MUSCULOSKELETAL: Denies any muscle or joint pain    SKIN: Denies skin rashes or itching  ENDOCRINE: Denies excessive thirst  Denies intolerance to heat or cold  PSYCHOSOCIAL: Denies depression or anxiety  Denies any recent memory loss  Historical Information   Past Medical History:   Diagnosis Date    Anemia     Cardiac disease     GERD (gastroesophageal reflux disease)     Hypertension     Stroke [facility])      Past Surgical History:   Procedure Laterality Date    APPENDECTOMY      CHOLECYSTECTOMY      FRACTURE SURGERY      HYSTERECTOMY       Social History   History   Alcohol Use No     History   Drug Use No     History   Smoking Status    Never Smoker   Smokeless Tobacco    Never Used     History reviewed  No pertinent family history  Meds/Allergies     Current Facility-Administered Medications   Medication Dose Route Frequency    acetaminophen (TYLENOL) tablet 650 mg  650 mg Oral Q6H PRN    gabapentin (NEURONTIN) capsule 100 mg  100 mg Oral Daily    pantoprazole (PROTONIX) injection 40 mg  40 mg Intravenous Q12H [address]    potassium chloride 20 mEq IVPB (premix)  20 mEq Intravenous Once    potassium chloride 20 mEq IVPB (premix)  20 mEq Intravenous Once       No Known Allergies      Objective     Blood pressure 109/59, pulse 76, temperature 98 5 °F (36 9 °C), resp  rate 17, height 4' 11" (1 499 m), weight 58 kg (127 lb 13 9 oz), SpO2 95 %  Intake/Output Summary (Last 24 hours) at 11/13/18 1244  Last data filed at 11/12/18 2248   Gross per 24 hour   Intake          1133 17 ml   Output                0 ml   Net          1133 17 ml         PHYSICAL EXAM:      General Appearance:   Alert and oriented to person   Cooperative, and in no respiratory distress   HEENT:   Normocephalic, atraumatic, anicteric      Neck:  Supple, symmetrical, trachea midline   Lungs:   Clear to auscultation bilaterally; no rales, rhonchi or wheezing; respirations unlabored    Heart[de-identified]   Irregularly irregular   Abdomen:   Soft, non-tender, non-distended; normal bowel sounds; no masses, no organomegaly    Genitalia:   Deferred    Rectal:   Like grey stool noted on exam   Extremities:  No cyanosis, clubbing or edema    Pulses:  2+ and symmetric all extremities    Skin:  Skin color, texture, turgor normal, no rashes or lesions    Lymph nodes:  No palpable cervical or supraclavicular lymphadenopathy        Lab Results:     Results from last 7 days  Lab Units 11/13/18  0532  11/12/18  1741   WBC Thousand/uL  --   --  10 34*   HEMOGLOBIN g/dL 11 3*  < > 11 9   HEMATOCRIT %  --   --  35 0   PLATELETS Thousands/uL  --   --  241   NEUTROS PCT %  --   --  87*   LYMPHS PCT %  --   --  7*   MONOS PCT %  --   --  5   EOS PCT %  --   --  0   < > = values in this interval not displayed  Results from last 7 days  Lab Units 11/13/18  0532 11/12/18  1741   POTASSIUM mmol/L 3 2* 3 1*   CHLORIDE mmol/L 101 97*   CO2 mmol/L 31 29   BUN mg/dL 12 13   CREATININE mg/dL 0 82 0 82   CALCIUM mg/dL 9 4 9 4   ALK PHOS U/L  --  95   ALT U/L  --  23   AST U/L  --  22       Results from last 7 days  Lab Units 11/12/18  1846   INR  1 08           Imaging Studies: I have personally reviewed pertinent imaging studies  Ct Abdomen Pelvis With Contrast    Result Date: 11/12/2018  Impression: 1  Decompressed stomach limiting evaluation  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  2   Diverticulosis without evidence of diverticulitis, colitis or bowel obstruction  3   Chronic L5 compression fracture  Workstation performed: [id number]       ASSESSMENT and PLAN:      1) Reported melena in the setting of Plavix - Patient was recently hospitalized this year in July for TIA  She is on Plavix for this  She has had several TIAs in the past   She is also currently in atrial fibrillation  Fortunately, she does not have signs of overt GI bleeding on exam   Her hemoglobin is also stable  No associated elevation in BUN  CT of the abdomen was also benign  I discussed this with the son  Given that she is currently in atrial fibrillation which is new and recent TIA, we will hold off on procedures until she is evaluated by Cardiology  I explained to her son that it is fortunate that her hemoglobin is currently stable  Patient's son would like to monitor her for now  However, he would consider EGD if she were to have drop in hemoglobin or signs of overt GI bleeding   - Continue PPI drip for now  - Cardiology consulted  - May need to consult Neurology regarding anticoagulation recommendations  - Discussed with the primary team  - Okay to have heparin given recent TIA  - Clear liquids for now  - NPO midnight      The patient was seen and examined by Dr Gunnar Perla, all acosta medical decisions were made with Dr Gunnar Perla  Thank you for allowing us to participate in the care of this pleasant patient  We will follow up with you closely

## 2018-11-14 LAB
ANION GAP SERPL CALCULATED.3IONS-SCNC: 8 MMOL/L (ref 4–13)
BILIRUB UR QL STRIP: NEGATIVE
BUN SERPL-MCNC: 8 MG/DL (ref 5–25)
CALCIUM SERPL-MCNC: 9.2 MG/DL (ref 8.3–10.1)
CHLORIDE SERPL-SCNC: 97 MMOL/L (ref 100–108)
CLARITY UR: CLEAR
CO2 SERPL-SCNC: 28 MMOL/L (ref 21–32)
COLOR UR: YELLOW
CREAT SERPL-MCNC: 0.78 MG/DL (ref 0.6–1.3)
GFR SERPL CREATININE-BSD FRML MDRD: 65 ML/MIN/1.73SQ M
GLUCOSE SERPL-MCNC: 149 MG/DL (ref 65–140)
GLUCOSE UR STRIP-MCNC: NEGATIVE MG/DL
HGB BLD-MCNC: 11 G/DL (ref 11.5–15.4)
HGB UR QL STRIP.AUTO: NEGATIVE
KETONES UR STRIP-MCNC: NEGATIVE MG/DL
LEUKOCYTE ESTERASE UR QL STRIP: NEGATIVE
NITRITE UR QL STRIP: NEGATIVE
PH UR STRIP.AUTO: 7.5 [PH] (ref 4.5–8)
POTASSIUM SERPL-SCNC: 3.5 MMOL/L (ref 3.5–5.3)
PROT UR STRIP-MCNC: NEGATIVE MG/DL
SODIUM SERPL-SCNC: 133 MMOL/L (ref 136–145)
SP GR UR STRIP.AUTO: 1.01 (ref 1–1.03)
UROBILINOGEN UR QL STRIP.AUTO: 0.2 E.U./DL

## 2018-11-14 PROCEDURE — 99233 SBSQ HOSP IP/OBS HIGH 50: CPT | Performed by: INTERNAL MEDICINE

## 2018-11-14 PROCEDURE — 85018 HEMOGLOBIN: CPT | Performed by: INTERNAL MEDICINE

## 2018-11-14 PROCEDURE — 99222 1ST HOSP IP/OBS MODERATE 55: CPT | Performed by: INTERNAL MEDICINE

## 2018-11-14 PROCEDURE — 81003 URINALYSIS AUTO W/O SCOPE: CPT | Performed by: GENERAL PRACTICE

## 2018-11-14 PROCEDURE — 80048 BASIC METABOLIC PNL TOTAL CA: CPT | Performed by: GENERAL PRACTICE

## 2018-11-14 PROCEDURE — 99232 SBSQ HOSP IP/OBS MODERATE 35: CPT | Performed by: GENERAL PRACTICE

## 2018-11-14 PROCEDURE — C9113 INJ PANTOPRAZOLE SODIUM, VIA: HCPCS | Performed by: INTERNAL MEDICINE

## 2018-11-14 RX ADMIN — PANTOPRAZOLE SODIUM 40 MG: 40 INJECTION, POWDER, FOR SOLUTION INTRAVENOUS at 08:23

## 2018-11-14 RX ADMIN — PANTOPRAZOLE SODIUM 40 MG: 40 INJECTION, POWDER, FOR SOLUTION INTRAVENOUS at 20:53

## 2018-11-14 RX ADMIN — GABAPENTIN 100 MG: 100 CAPSULE ORAL at 08:36

## 2018-11-14 NOTE — PROGRESS NOTES
Progress Note - Jean Claude Stanley 80 y o  female MRN: [id number]    Unit/Bed#: -01 Encounter: [id number]        Subjective:     No bowel movement thus far this morning  Patient has no complaints  ROS: As noted in the HPI, otherwise all others negative  Objective:     Vitals: Blood pressure 115/65, pulse 71, temperature (!) 97 4 °F (36 3 °C), temperature source Oral, resp  rate 18, height 4' 11" (1 499 m), weight 52 1 kg (114 lb 13 8 oz), SpO2 98 %  ,Body mass index is 23 2 kg/m²  Intake/Output Summary (Last 24 hours) at 11/14/18 1601  Last data filed at 11/14/18 0900   Gross per 24 hour   Intake              800 ml   Output              175 ml   Net              625 ml       Physical Exam:     General Appearance: Alert and oriented   In no respiratory distress  Lungs: Clear to auscultation bilaterally, no rales or rhonchi  Heart: Regular rate and rhythm, S1, S2 normal, no murmur, click, rub or gallop  Abdomen: Soft, non-tender, non-distended; bowel sounds normal; no masses or no organomegaly  Extremities: No cyanosis, edema    Invasive Devices     Peripheral Intravenous Line            Peripheral IV 11/13/18 Right Arm 1 day                Lab Results:    Results from last 7 days  Lab Units 11/14/18  0116  11/12/18  1741   WBC Thousand/uL  --   --  10 34*   HEMOGLOBIN g/dL 11 0*  < > 11 9   HEMATOCRIT %  --   --  35 0   PLATELETS Thousands/uL  --   --  241   NEUTROS PCT %  --   --  87*   LYMPHS PCT %  --   --  7*   MONOS PCT %  --   --  5   EOS PCT %  --   --  0   < > = values in this interval not displayed      Results from last 7 days  Lab Units 11/13/18  0532 11/12/18  1741   POTASSIUM mmol/L 3 2* 3 1*   CHLORIDE mmol/L 101 97*   CO2 mmol/L 31 29   BUN mg/dL 12 13   CREATININE mg/dL 0 82 0 82   CALCIUM mg/dL 9 4 9 4   ALK PHOS U/L  --  95   ALT U/L  --  23   AST U/L  --  22       Results from last 7 days  Lab Units 11/12/18  1846   INR  1 08           Imaging Studies: I have personally reviewed pertinent imaging studies  Ct Abdomen Pelvis With Contrast    Result Date: 11/12/2018  Impression: 1  Decompressed stomach limiting evaluation  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  2   Diverticulosis without evidence of diverticulitis, colitis or bowel obstruction  3   Chronic L5 compression fracture  Workstation performed: [id number]         Assessment and Plan:     1) Reported melena in the setting of Plavix - Patient was recently hospitalized this year in July for TIA  She is on Plavix for this  She has had several TIAs in the past   She is also currently in atrial fibrillation  Fortunately, she does not have signs of overt GI bleeding on exam   Hemoglobin continues to be stable  Hemoglobin at 11 today  No associated elevation in BUN  CT of the abdomen was also benign  Per previous discussion and discussion with Cardiology, the patient's son does not want his mother to undergo invasive procedures  - Would continue PPI at least once daily as an outpatient  - Discussed with the primary team, Dr Emilio Loja  - Diet as tolerated   - Appreciate cardiology recommendations and discussed with Dr Monse Cote   No plan to restart anticoagulation if patient's son is not agreeable to EGD  - In that case, patient is stable for discharge from a GI standpoint

## 2018-11-14 NOTE — CONSULTS
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 is a 80y o  year old female who has a history of anemia, GERD, hypertension, multiple TIAs  She presented to the ED with 1 week of intermittent rectal bleeding and melena which is getting worse  States she also feels weak and SOB at times  Patient admits to history of GI bleeds before and has been following up with GI  Patient during this admission was found to be in new AF  Patient hemodynamically stable, HgB 11 now and holding  Cardiology is being consulted today to evaluate need for anticoagulation given patient having increased bleed risk and increased stroke risk  Has history of multiple TIAs in the past and is taking Plavix 75mg daily  Denies chest pain, chest pressure, chest heaviness, palpitations, shortness of breath, headache, syncope, dizziness or swelling in the extremities  REVIEW OF SYSTEMS:  Constitutional:  Denies fever or chills   Eyes:  Denies change in visual acuity   HENT:  + Right cheek mass   Denies nasal congestion or sore throat   Respiratory:  Denies cough or shortness of breath   Cardiovascular:  Denies chest pain or edema   GI:  Denies abdominal pain, nausea, vomiting, bloody stools or diarrhea   :  Denies dysuria, frequency, difficulty in micturition and nocturia  Musculoskeletal:  Denies back pain or joint pain   Neurologic:  Denies headache, focal weakness or sensory changes   Endocrine:  Denies polyuria or polydipsia   Lymphatic:  Denies swollen glands   Psychiatric:  Denies depression or anxiety     Historical Information   Past Medical History:   Diagnosis Date    Anemia     Cardiac disease     GERD (gastroesophageal reflux disease)     Hypertension     Stroke [facility])      Past Surgical History:   Procedure Laterality Date    APPENDECTOMY      CHOLECYSTECTOMY      FRACTURE SURGERY      HYSTERECTOMY       History   Alcohol Use No     History   Drug Use No     History   Smoking Status    Never Smoker   Smokeless Tobacco    Never Used       Family History: History reviewed  No pertinent family history  MEDS & ALLERGIES:  all current active meds have been reviewed and current meds: Current Facility-Administered Medications   Medication Dose Route Frequency    acetaminophen (TYLENOL) tablet 650 mg  650 mg Oral Q6H PRN    gabapentin (NEURONTIN) capsule 100 mg  100 mg Oral Daily    pantoprazole (PROTONIX) injection 40 mg  40 mg Intravenous Q12H [facility]        No Known Allergies    OBJECTIVE:  Vitals:   Vitals:    11/14/18 0300   BP: 127/58   Pulse: 73   Resp: 18   Temp: 97 9 °F (36 6 °C)   SpO2: 96%     Body mass index is 23 2 kg/m²  Systolic (46SCA), AMC:748 , Min:94 , LME:949     Diastolic (52HCP), KJY:08, Min:58, Max:71      Intake/Output Summary (Last 24 hours) at 11/14/18 1016  Last data filed at 11/14/18 0321   Gross per 24 hour   Intake                0 ml   Output              175 ml   Net             -175 ml     Weight (last 2 days)     Date/Time   Weight    11/13/18 1500  52 1 (114 86)    11/12/18 1607  58 (127 87)            Invasive Devices     Peripheral Intravenous Line            Peripheral IV 11/13/18 Right Arm less than 1 day                PHYSICAL EXAMS:  General:  Patient is not in acute distress, laying in the bed comfortably, awake, alert responding to commands  Head: Normocephalic, Atraumatic  HEENT: White sclera, pink conjunctiva,  PERRLA,pharynx benign  Neck:  Supple, no neck vein distention, carotids+2/+2 no bruits, thyromegaly, adenopathy  Respiratory: clear to P/A  Cardiovascular:  +Irregular rhythm   PMI normal, S1-S2 normal, No  Murmurs, thrills, gallops, rubs     GI:  Abdomen soft nontender  No hepatosplenomegaly, adenopathy, ascites,or rebound tenderness  Extremities: No edema, normal pulses, no calf tenderness, no joint deformities, no venous disease   Integument:  No skin rashes or ulceration  Lymphatic:  No cervical or inguinal lymphadenopathy  Neurologic:  Patient is awake alert, responding to command, well-oriented to time and place and person moving all extremities    LABORATORY RESULTS:    Results from last 7 days  Lab Units 11/12/18  1741   TROPONIN I ng/mL <0 02     CBC with diff:   Results from last 7 days  Lab Units 11/14/18  0116 11/13/18  1650 11/13/18  0532  11/12/18  1741   WBC Thousand/uL  --   --   --   --  10 34*   HEMOGLOBIN g/dL 11 0* 12 2 11 3*  < > 11 9   HEMATOCRIT %  --   --   --   --  35 0   MCV fL  --   --   --   --  93   PLATELETS Thousands/uL  --   --   --   --  241   MCH pg  --   --   --   --  31 6   MCHC g/dL  --   --   --   --  34 0   RDW %  --   --   --   --  15 0   MPV fL  --   --   --   --  10 0   NRBC AUTO /100 WBCs  --   --   --   --  0   < > = values in this interval not displayed      CMP:  Results from last 7 days  Lab Units 11/13/18  0532 11/12/18  1741   POTASSIUM mmol/L 3 2* 3 1*   CHLORIDE mmol/L 101 97*   CO2 mmol/L 31 29   BUN mg/dL 12 13   CREATININE mg/dL 0 82 0 82   CALCIUM mg/dL 9 4 9 4   AST U/L  --  22   ALT U/L  --  23   ALK PHOS U/L  --  95   EGFR ml/min/1 73sq m 61 61       BMP:  Results from last 7 days  Lab Units 11/13/18  0532 11/12/18  1741   POTASSIUM mmol/L 3 2* 3 1*   CHLORIDE mmol/L 101 97*   CO2 mmol/L 31 29   BUN mg/dL 12 13   CREATININE mg/dL 0 82 0 82   CALCIUM mg/dL 9 4 9 4              Results from last 7 days  Lab Units 11/13/18  0532   MAGNESIUM mg/dL 2 1               Results from last 7 days  Lab Units 11/12/18  1846 11/12/18  1741   INR  1 08 3 18*       Lipid Profile:   No results found for: CHOL  Lab Results   Component Value Date    HDL 67 (H) 09/18/2018    HDL 72 (H) 07/01/2018     Lab Results   Component Value Date    LDLCALC 37 2018    LDLCALC 121 (H) 2018     Lab Results   Component Value Date    TRIG 76 2018    TRIG 92 2018       Cardiac testing:   Results for orders placed during the hospital encounter of 18   Echo complete with contrast if indicated    [place], [place] [address]  [phone]    Transthoracic Echocardiogram  2D, M-mode, Doppler, and Color Doppler    Study date:  2018    Patient: Shan Acosta  MR number: [id number]  Account number: [de-identified]  : 1923  Age: 80 years  Gender: Female  Status: Inpatient  Location: Bedside  Height: 60 in  Weight: 134 lb  BP: 131/ 62 mmHg    Indications: TIA, Evaluate for suspected cardiac source of embolism  Diagnoses: G45 9 - Transient cerebral ischemic attack, unspecified    Sonographer:  Najma Austin  Interpreting Physician:  Benton Stoddard MD  Referring Physician:  Francine Banuelos PA-C  Group:  [facility]    SUMMARY    LEFT VENTRICLE:  Systolic function was normal  Ejection fraction was estimated to be 60 %  There were no regional wall motion abnormalities  RIGHT VENTRICLE:  The size was normal     LEFT ATRIUM:  The atrium was mildly dilated  MITRAL VALVE:  There was mild annular calcification  There was trace regurgitation  TRICUSPID VALVE:  There was mild regurgitation  Pulmonary artery systolic pressure was within the normal range  PULMONIC VALVE:  There was mild regurgitation  HISTORY: Consistent with transient ischemic attack or stroke  PRIOR HISTORY: Dementia, Risk factors: hypertension  PROCEDURE: The procedure was performed at the bedside  This was a routine study  The transthoracic approach was used  The study included complete 2D imaging, M-mode, complete spectral Doppler, and color Doppler  The heart rate was 79 bpm,  at the start of the study   Images were obtained from the parasternal, apical, subcostal, and suprasternal notch acoustic windows  Image quality was adequate  LEFT VENTRICLE: Size was normal  Systolic function was normal  Ejection fraction was estimated to be 60 %  There were no regional wall motion abnormalities  Wall thickness was normal  No evidence of apical thrombus  DOPPLER: Left  ventricular diastolic function parameters were normal     RIGHT VENTRICLE: The size was normal  Systolic function was normal  Wall thickness was normal     LEFT ATRIUM: The atrium was mildly dilated  RIGHT ATRIUM: Size was normal     MITRAL VALVE: There was mild annular calcification  There was normal leaflet separation  DOPPLER: The transmitral velocity was within the normal range  There was no evidence for stenosis  There was trace regurgitation  AORTIC VALVE: The valve was trileaflet  Leaflets exhibited mildly increased thickness, mild calcification, and normal cuspal separation  DOPPLER: Transaortic velocity was within the normal range  There was no evidence for stenosis  There  was no significant regurgitation  TRICUSPID VALVE: The valve structure was normal  There was normal leaflet separation  DOPPLER: The transtricuspid velocity was within the normal range  There was no evidence for stenosis  There was mild regurgitation  Pulmonary artery  systolic pressure was within the normal range  PULMONIC VALVE: Leaflets exhibited normal thickness, no calcification, and normal cuspal separation  DOPPLER: The transpulmonic velocity was within the normal range  There was mild regurgitation  PERICARDIUM: There was no pericardial effusion  The pericardium was normal in appearance  AORTA: The root exhibited normal size  SYSTEMIC VEINS: IVC: The inferior vena cava was normal in size      SYSTEM MEASUREMENT TABLES    2D  %FS: 31 3 %  Ao Diam: 2 9 cm  EDV(Teich): 58 3 ml  EF(Teich): 59 9 %  ESV(Teich): 23 4 ml  IVSd: 1 2 cm  LA Area: 21 1 cm2  LA Diam: 2 8 cm  LVEDV MOD A4C: 56 7 ml  LVEF MOD A4C: 67 4 %  LVESV MOD A4C: 18 5 ml  LVIDd: 3 7 cm  LVIDs: 2 5 cm  LVLd A4C: 7 7 cm  LVLs A4C: 6 1 cm  LVPWd: 1 cm  RA Area: 15 cm2  RVIDd: 3 1 cm  SV MOD A4C: 38 3 ml  SV(Teich): 35 ml    CW  TR Vmax: 2 4 m/s  TR maxP 3 mmHg    MM  TAPSE: 0 9 cm    PW  E': 0 1 m/s  E/E': 18 8  MV A [place]: 0 3 m/s  MV Dec [place]: 5 3 m/s2  MV DecT: 217 ms  MV E [place]: 1 2 m/s  MV E/A Ratio: 3 6  MV PHT: 62 9 ms  MVA By PHT: 3 5 cm2    [employer] Accredited Echocardi[facility]    Prepared and electronically signed by    Sylvie Vilchis MD  Signed 2018 16:25:26       No results found for this or any previous visit  No procedure found  No results found for this or any previous visit  Imaging:   I have personally reviewed pertinent reports  EKG reviewed personally:  Atrial fibrillation     Telemetry reviewed personally:   Atrial fibrillation     Assessment/Plan:  1- New Atrial Fibrillation in setting of possible GI bleed -   Heart rate in the 80s  Would not start patient on anticoagulant given increased bleed risk  Will assess need on an outpatient basis given patient status  HASBLED score - 4 (Age, Plavix, stroke)  [id number] score - 5 (female, age, stroke)  Will follow Neurology recommendation to hold Plavix  Following with GI     2- Hypertension   ECHO performed on 2018 showed EF of 60%, no regional wall abnormalities, trace MR, mild TR  Stable, continue with diet and exercise  3- History of Stroke, TIAs  Follow up with Neurology    4- GERD  Management per SLIM         Code Status: Level 3 - DNAR and DNI    Counseling / Coordination of Care  Total floor / unit time spent today 35 minutes  Greater than 50% of total time was spent with the patient and / or family counseling and / or coordination of care  A description of the counseling / coordination of care: Review of history, current assessment, development of a plan      Anisa Elizalde XAVIER  11/14/2018,10:16 AM

## 2018-11-14 NOTE — PROGRESS NOTES
Progress Note - Tila Ramírez 1923, 80 y o  female MRN: [id number]    Unit/Bed#: -01 Encounter: [id number]    Primary Care Provider: Ander Reagan DO   Date and time admitted to hospital: 2018  4:44 PM        Dyslipidemia   Assessment & Plan    Hold cholesterol medication while NPO  -restart once cleared for diet by GI     Dementia without behavioral disturbance   Assessment & Plan    -fall precautions  -supportive care     Mass of parotid gland   Assessment & Plan    Has had for many years and stable     * GI bleed   Assessment & Plan    Case d/w GI-as not starting anticoagulation-will not do endoscopy-conservative rx recommended         VTE Pharmacologic Prophylaxis:   Pharmacologic: Pharmacologic VTE Prophylaxis contraindicated due to gi bleed  Mechanical VTE Prophylaxis in Place: Yes    Patient Centered Rounds: I have performed bedside rounds with nursing staff today  Discussions with Specialists or Other Care Team Provider:     Education and Discussions with Family / Patient:     Time Spent for Care: 30 minutes  More than 50% of total time spent on counseling and coordination of care as described above  Current Length of Stay: 2 day(s)    Current Patient Status: Inpatient   Certification Statement: The patient will continue to require additional inpatient hospital stay due to possible discharge hgb stable    Discharge Plan: home    Code Status: Level 3 - DNAR and DNI      Subjective:   No c/o  Objective:     Vitals:   Temp (24hrs), Av 8 °F (36 6 °C), Min:97 4 °F (36 3 °C), Max:98 °F (36 7 °C)    Temp:  [97 4 °F (36 3 °C)-98 °F (36 7 °C)] 97 4 °F (36 3 °C)  HR:  [71-92] 71  Resp:  [18] 18  BP: ()/(58-72) 115/65  SpO2:  [93 %-98 %] 98 %  Body mass index is 23 2 kg/m²  Input and Output Summary (last 24 hours):        Intake/Output Summary (Last 24 hours) at 18 1601  Last data filed at 18 0900   Gross per 24 hour   Intake              800 ml   Output 175 ml   Net              625 ml       Physical Exam:     Physical Exam   Constitutional: She is oriented to person, place, and time  She appears well-developed and well-nourished  HENT:   Head: Normocephalic and atraumatic  Eyes: Pupils are equal, round, and reactive to light  Neck: Neck supple  Cardiovascular: Normal rate and regular rhythm  Pulmonary/Chest: Effort normal and breath sounds normal    Abdominal: Soft  Bowel sounds are normal    Musculoskeletal: She exhibits no edema  Neurological: She is alert and oriented to person, place, and time  Additional Data:     Labs:      Results from last 7 days  Lab Units 11/14/18  0116  11/12/18  1741   WBC Thousand/uL  --   --  10 34*   HEMOGLOBIN g/dL 11 0*  < > 11 9   HEMATOCRIT %  --   --  35 0   PLATELETS Thousands/uL  --   --  241   NEUTROS PCT %  --   --  87*   LYMPHS PCT %  --   --  7*   MONOS PCT %  --   --  5   EOS PCT %  --   --  0   < > = values in this interval not displayed  Results from last 7 days  Lab Units 11/13/18  0532 11/12/18  1741   POTASSIUM mmol/L 3 2* 3 1*   CHLORIDE mmol/L 101 97*   CO2 mmol/L 31 29   BUN mg/dL 12 13   CREATININE mg/dL 0 82 0 82   ANION GAP mmol/L 8 10   CALCIUM mg/dL 9 4 9 4   ALBUMIN g/dL  --  3 6   TOTAL BILIRUBIN mg/dL  --  0 80   ALK PHOS U/L  --  95   ALT U/L  --  23   AST U/L  --  22       Results from last 7 days  Lab Units 11/12/18  1846   INR  1 08                       * I Have Reviewed All Lab Data Listed Above  * Additional Pertinent Lab Tests Reviewed: All Labs Within Last 24 Hours Reviewed    Imaging:    Imaging Reports Reviewed Today Include:   Imaging Personally Reviewed by Myself Includes:      Recent Cultures (last 7 days):       Results from last 7 days  Lab Units 11/12/18  1741 11/12/18  1720   BLOOD CULTURE  No Growth at 24 hrs  No Growth at 24 hrs         Last 24 Hours Medication List:     Current Facility-Administered Medications:  acetaminophen 650 mg Oral Q6H PRN Delilah Gouge Sterling Tang MD   gabapentin 100 mg Oral Daily Yaneth Fierro MD   pantoprazole 40 mg Intravenous Q12H Jim Colon MD        Today, Patient Was Seen By: Jo Ann Harrison MD    ** Please Note: Dictation voice to text software may have been used in the creation of this document   **

## 2018-11-15 PROBLEM — R33.9 URINARY RETENTION: Status: ACTIVE | Noted: 2018-11-15

## 2018-11-15 PROBLEM — K92.2 GI BLEED: Status: RESOLVED | Noted: 2018-11-12 | Resolved: 2018-11-15

## 2018-11-15 PROCEDURE — 99232 SBSQ HOSP IP/OBS MODERATE 35: CPT | Performed by: GENERAL PRACTICE

## 2018-11-15 PROCEDURE — C9113 INJ PANTOPRAZOLE SODIUM, VIA: HCPCS | Performed by: INTERNAL MEDICINE

## 2018-11-15 RX ORDER — PANTOPRAZOLE SODIUM 40 MG/1
40 TABLET, DELAYED RELEASE ORAL
Status: DISCONTINUED | OUTPATIENT
Start: 2018-11-16 | End: 2018-11-17 | Stop reason: HOSPADM

## 2018-11-15 RX ADMIN — PANTOPRAZOLE SODIUM 40 MG: 40 INJECTION, POWDER, FOR SOLUTION INTRAVENOUS at 08:10

## 2018-11-15 RX ADMIN — GABAPENTIN 100 MG: 100 CAPSULE ORAL at 08:10

## 2018-11-15 NOTE — SOCIAL WORK
CM attempted to reach pt's son, Shelbie Speaker on his cell and home phone but was unsuccessful  It did not allow CM leave a  at this time

## 2018-11-15 NOTE — PROGRESS NOTES
Progress Note - Saqib Eddy 1923, 80 y o  female MRN: [id number]    Unit/Bed#: -[place] Encounter: [id number]    Primary Care Provider: Diogo Walden DO   Date and time admitted to hospital: 2018  4:44 PM        Urinary retention   Assessment & Plan    Urinary retention protocol     Dyslipidemia   Assessment & Plan    Resume home med     Dementia without behavioral disturbance   Assessment & Plan    -fall precautions  -supportive care     Mass of parotid gland   Assessment & Plan    Has had for many years and stable     * GI bleed   Assessment & Plan    Case d/w GI-as not starting anticoagulation-will not do endoscopy-conservative rx recommended; case d/w son-does not want invasive procedure and aware of high risk of stroke without any anicoagulation now with gi bleed-will proceed without anticoagulation         VTE Pharmacologic Prophylaxis:   Pharmacologic: Pharmacologic VTE Prophylaxis contraindicated due to gi bleed  Mechanical VTE Prophylaxis in Place: Yes    Patient Centered Rounds: I have performed bedside rounds with nursing staff today  Discussions with Specialists or Other Care Team Provider:     Education and Discussions with Family / Patient:     Time Spent for Care: 30 minutes  More than 50% of total time spent on counseling and coordination of care as described above      Current Length of Stay: 3 day(s)    Current Patient Status: Inpatient   Certification Statement: The patient will continue to require additional inpatient hospital stay due to possible discharge today    Discharge Plan: home    Code Status: Level 3 - DNAR and DNI      Subjective:   No c/o-did have urinary retention per nursine    Objective:     Vitals:   Temp (24hrs), Av 8 °F (36 6 °C), Min:97 4 °F (36 3 °C), Max:98 1 °F (36 7 °C)    Temp:  [97 4 °F (36 3 °C)-98 1 °F (36 7 °C)] 97 9 °F (36 6 °C)  HR:  [81-88] 81  Resp:  [18] 18  BP: (106-132)/(67-77) 131/68  SpO2:  [95 %-97 %] 95 %  Body mass index is 23 2 kg/m²  Input and Output Summary (last 24 hours): Intake/Output Summary (Last 24 hours) at 11/15/18 1145  Last data filed at 11/15/18 1101   Gross per 24 hour   Intake              480 ml   Output             2110 ml   Net            -1630 ml       Physical Exam:     Physical Exam   Constitutional: She appears well-developed and well-nourished  HENT:   Head: Normocephalic and atraumatic  Eyes: Pupils are equal, round, and reactive to light  Cardiovascular: Normal rate and regular rhythm  Pulmonary/Chest: Effort normal and breath sounds normal    Abdominal: Soft  Bowel sounds are normal    Musculoskeletal: She exhibits no edema  Neurological: She is alert  Additional Data:     Labs:      Results from last 7 days  Lab Units 11/14/18  0116  11/12/18  1741   WBC Thousand/uL  --   --  10 34*   HEMOGLOBIN g/dL 11 0*  < > 11 9   HEMATOCRIT %  --   --  35 0   PLATELETS Thousands/uL  --   --  241   NEUTROS PCT %  --   --  87*   LYMPHS PCT %  --   --  7*   MONOS PCT %  --   --  5   EOS PCT %  --   --  0   < > = values in this interval not displayed  Results from last 7 days  Lab Units 11/14/18  1642  11/12/18  1741   POTASSIUM mmol/L 3 5  < > 3 1*   CHLORIDE mmol/L 97*  < > 97*   CO2 mmol/L 28  < > 29   BUN mg/dL 8  < > 13   CREATININE mg/dL 0 78  < > 0 82   ANION GAP mmol/L 8  < > 10   CALCIUM mg/dL 9 2  < > 9 4   ALBUMIN g/dL  --   --  3 6   TOTAL BILIRUBIN mg/dL  --   --  0 80   ALK PHOS U/L  --   --  95   ALT U/L  --   --  23   AST U/L  --   --  22   < > = values in this interval not displayed  Results from last 7 days  Lab Units 11/12/18  1846   INR  1 08                       * I Have Reviewed All Lab Data Listed Above  * Additional Pertinent Lab Tests Reviewed:  All Labs Within Last 24 Hours Reviewed    Imaging:    Imaging Reports Reviewed Today Include:   Imaging Personally Reviewed by Myself Includes:      Recent Cultures (last 7 days):       Results from last 7 days  Lab Units 11/12/18  1741 11/12/18  1720   BLOOD CULTURE  No Growth at 48 hrs  No Growth at 48 hrs  Last 24 Hours Medication List:     Current Facility-Administered Medications:  acetaminophen 650 mg Oral Q6H PRN Arvind Chase MD   gabapentin 100 mg Oral Daily Arvind Chase MD   pantoprazole 40 mg Intravenous Q12H Nelwyn Galeazzi, MD        Today, Patient Was Seen By: Estrella Ureña MD    ** Please Note: Dictation voice to text software may have been used in the creation of this document   **

## 2018-11-15 NOTE — ASSESSMENT & PLAN NOTE
Case d/w GI-as not starting anticoagulation-will not do endoscopy-conservative rx recommended; case d/w son-does not want invasive procedure and aware of high risk of stroke without any anicoagulation now with gi bleed-will proceed without anticoagulation

## 2018-11-16 PROBLEM — I48.91 ATRIAL FIBRILLATION (HCC): Status: ACTIVE | Noted: 2018-11-16

## 2018-11-16 PROCEDURE — 99222 1ST HOSP IP/OBS MODERATE 55: CPT | Performed by: NURSE PRACTITIONER

## 2018-11-16 PROCEDURE — 97163 PT EVAL HIGH COMPLEX 45 MIN: CPT

## 2018-11-16 PROCEDURE — G8979 MOBILITY GOAL STATUS: HCPCS

## 2018-11-16 PROCEDURE — 99232 SBSQ HOSP IP/OBS MODERATE 35: CPT | Performed by: GENERAL PRACTICE

## 2018-11-16 PROCEDURE — 97110 THERAPEUTIC EXERCISES: CPT

## 2018-11-16 PROCEDURE — G8978 MOBILITY CURRENT STATUS: HCPCS

## 2018-11-16 RX ORDER — PANTOPRAZOLE SODIUM 40 MG/1
40 TABLET, DELAYED RELEASE ORAL
Qty: 30 TABLET | Refills: 0 | Status: SHIPPED | OUTPATIENT
Start: 2018-11-17

## 2018-11-16 RX ADMIN — PANTOPRAZOLE SODIUM 40 MG: 40 TABLET, DELAYED RELEASE ORAL at 05:50

## 2018-11-16 RX ADMIN — GABAPENTIN 100 MG: 100 CAPSULE ORAL at 08:17

## 2018-11-16 NOTE — DISCHARGE INSTRUCTIONS
Wesley Cath Care instructions---Maintain wesley catheter to straight drainage  May use leg bag and shower  May flush daily prn using Juanjo syringe and 120 ml NSS  May use more saline ad charlene to prevent/treat cath obstruction  Remove catheter on 5th day post-hospital discharge by 0800 hrs  (If on weekend, wait until next business day)  Straight cath if no void or less than 200ml in by next AM & repeat process  If patient requires straight cath x2days, re-insert wesley and call for Urologist follow-up  Information above  Wesley can remain in place for up to 4 weeks at a time  Wesley placed at [facility] 11/15/2018    Attention!!  If patient voids passively (urinary incontinence) and does not offer complaints of suprapubic or bladder overdistention  Do not reinsert catheter  Apply incontinence pad and weigh to determine if voiding adequately

## 2018-11-16 NOTE — ASSESSMENT & PLAN NOTE
Urology consult appreciated will maintain wesley for discharge  Son aware of need for wesley care and does not wish any additional follow up of urinary retention due to age [id number] is aware of L5 compression fracture and possible spinal etiology

## 2018-11-16 NOTE — PHYSICAL THERAPY NOTE
Physical Therapy Evaluation     Patient's Name: Vero Dior    Admitting Diagnosis  Rectal bleeding [K62 5]  GI bleed [K92 2]  Atrial fibrillation, unspecified type ([facility] [address] ) [I48 91]    Problem List  Patient Active Problem List   Diagnosis    TIA (transient ischemic attack)    Mass of parotid gland    Dementia without behavioral disturbance    Acute cystitis without hematuria    Dyslipidemia    Gastroesophageal reflux disease without esophagitis    Bilateral hearing loss    Lower leg edema    GI bleed    Urinary retention       Past Medical History  Past Medical History:   Diagnosis Date    Anemia     Cardiac disease     GERD (gastroesophageal reflux disease)     Hypertension     Stroke [facility])        Past Surgical History  Past Surgical History:   Procedure Laterality Date    APPENDECTOMY      CHOLECYSTECTOMY      FRACTURE SURGERY      HYSTERECTOMY          11/16/18 1025   Note Type   Note type Eval/Treat   Pain Assessment   Pain Assessment 0-10   Pain Score (pt did not provide numeric score)   Pain Location Abdomen   Home Living   Type of Home House   Home Layout Two level;Performs ADLs on one level;Stairs to enter with rails  (1st floor setup, 3 JONY via porch with B handrails)   Bathroom Shower/Tub Tub/shower unit  (Sponge bathes at sink)   Bathroom Toilet Standard   Bathroom Accessibility Accessible   Home Equipment Walker;Life alert   Additional Comments Patient reports she ambulates independently with RW in the home  Family provides A with transportation  Patient lives alone, however, her son stays with her every night per patient/family report      Prior Function   Level of [place] Independent with ADLs and functional mobility   Lives With Alone   Receives Help From Family  (son stays with pt overnight)   ADL Assistance Independent   IADLs Needs assistance   Falls in the last 6 months 1 to 4   Vocational Retired   Restrictions/Precautions   Wells Grahn Bearing Precautions Per Order No   Braces or Orthoses (none per pt)   Other Precautions Chair Alarm; Bed Alarm;Multiple lines; Fall Risk  (back safety precautions, [place])   General   Family/Caregiver Present No   Cognition   Overall Cognitive Status Impaired   Arousal/Participation Alert   Orientation Level Oriented to person;Oriented to place; Disoriented to time;Disoriented to situation   Memory Decreased recall of recent events;Decreased recall of precautions   Following Commands Follows one step commands with increased time or repetition   Comments pt agreeable to PT eval   RUE Assessment   RUE Assessment WFL   LUE Assessment   LUE Assessment WFL   RLE Assessment   RLE Assessment WFL  (grossly 3+/5)   LLE Assessment   LLE Assessment WFL  (grossly 3+/5)   Coordination   Movements are Fluid and Coordinated 1   Sensation WFL   Light Touch   RLE Light Touch Grossly intact   LLE Light Touch Grossly intact   Bed Mobility   Supine to Sit 3  Moderate assistance  (log roll technique)   Additional items Assist x 1;HOB elevated; Increased time required;Verbal cues   Additional Comments vitals OOB in recliner: 80bpm, 96% SpO2 on RA, 139/69mmHg   Transfers   Sit to Stand 4  Minimal assistance   Additional items Assist x 2;Armrests; Increased time required;Verbal cues   Stand to Sit 4  Minimal assistance   Additional items Assist x 2;Armrests; Increased time required;Verbal cues   Ambulation/Elevation   Gait pattern Decreased foot clearance; Forward Flexion; Short stride; Step to   Gait Assistance 4  Minimal assist   Additional items Assist x 2;Verbal cues; Tactile cues  (for RW management)   Assistive Device Rolling walker   Distance 5'   Stair Management Assistance Not tested   Balance   Static Sitting Fair +   Dynamic Sitting Fair   Static Standing Fair -   Dynamic Standing Poor +   Ambulatory Poor +   Endurance Deficit   Endurance Deficit Yes   Activity Tolerance   Activity Tolerance Patient limited by Sheldon Gudino and [facility]   Nurse Made Aware LETICIA Thomas Sample verbalized pt appropriate to see, made aware of session outcome/recs   Assessment   Prognosis Good   Problem List Decreased strength;Decreased endurance; Impaired balance;Decreased mobility; Decreased safety awareness; Impaired hearing;Pain   Assessment Pt is 80 y o  female seen for PT evaluation on 11/16/2018 s/p admit to [facility] on 11/12/2018 w/ GI bleed  Pt presents with melanotic stools and episode of hematemesis  PT consulted to assess pt's functional mobility and d/c needs  Order placed for PT eval and tx, w/ up w/ A order  Performed at least 2 patient identifiers during session: Name and wristband  Comorbidities affecting pt's physical performance at time of assessment include: anemia, cardiac disease, GERD, HTN, stroke, [place], dementia  PTA, pt was independent w/ all functional mobility w/ RW, ambulates household distances, has 3 JONY, lives alone (son is able to stay with patient overnight) in 2 level home (1st floor setup) and retired  Personal factors affecting pt at time of IE include: inaccessible home environment, ambulating w/ assistive device, stairs to enter home, inability to ambulate household distances, inability to navigate level surfaces w/o external assistance, unable to perform dynamic tasks in community, limited home support, positive fall history, hearing impairments, decreased initiation and engagement and limited insight into impairments  Please find objective findings from PT assessment regarding body systems outlined above with impairments and limitations including weakness, impaired balance, decreased endurance, gait deviations, pain, decreased activity tolerance, decreased safety awareness and fall risk, as well as mobility assessment (need for min-mod assist w/ all phases of mobility when usually ambulating independently and need for cueing for mobility technique)   The following objective measures performed on IE also reveal limitations: Barthel Index: 30/100 and Modified [place]: 4 (moderate/severe disability)  Pt's clinical presentation is currently unstable/unpredictable seen in pt's presentation of need for input for task focus and mobility technique and ongoing medical assessment  Pt to benefit from continued PT tx to address deficits as defined above and maximize level of functional independent mobility and consistency  From PT/mobility standpoint, recommendation at time of d/c would be STR pending progress in order to facilitate return to PLOF  Barriers to Discharge Inaccessible home environment;Decreased caregiver support   Goals   Patient Goals to return home   STG Expiration Date 11/26/18   Short Term Goal #1 In 7-10 days: Increase bilateral LE strength 1/2 grade to facilitate independent mobility, Perform all bed mobility tasks modified independent to decrease caregiver burden, Perform all transfers modified independent to improve independence, Ambulate > 50 ft  with least restrictive assistive device modified independent w/o LOB and w/ normalized gait pattern 100% of the time, Navigate 3 stairs modified independent with unilateral handrail to facilitate return to previous living environment, Increase all balance 1/2 grade to decrease risk for falls, Complete exercise program independently, Tolerate 4 hr OOB to faciliate upright tolerance, Improve Barthel Index score to 45 or greater to facilitate independence and PT provider will perform functional balance assessment to determine fall risk   Treatment Day 0   Plan   Treatment/Interventions Functional transfer training;LE strengthening/ROM; Elevations; Therapeutic exercise; Endurance training;Patient/family training;Equipment eval/education; Bed mobility;Gait training;Spoke to nursing;Spoke to case management;Spoke to MD   PT Frequency 5x/wk   Recommendation   Recommendation Short-term skilled PT   Equipment Recommended (continue with RW)   PT - OK to Discharge Yes  (when medically cleared if to STR)   Modified [place] Scale   Modified [place] Scale 4   Barthel Index   Feeding 5   Bathing 0   Grooming Score 5   Dressing Score 5   Bladder Score 0   Bowels Score 5   Toilet Use Score 5   Transfers (Bed/Chair) Score 5   Mobility (Level Surface) Score 0   Stairs Score 0   Barthel Index Score 30         Sera Ahumada, PT, DPT    Physical Therapy Treatment Note  Time In: 1043  Time Out: 1058  Total Time: 15 min     S:  Pt agreeable to PT treatment session s/p PT eval, in no apparent distress, A&O x 2 and responsive      O:  Pt seen for PT treatment session this date with interventions consisting of Therapeutic exercise consisting of: AROM 10 reps B LE in sitting position  No pain reported throughout exercise reps  VC required for technique      A:  Pt able to tolerate initiation of seated SAQ, LAQ, heel slides, and seated marches for 10 reps each, no pain reported  Pt reports familiarity of such as she performs exercises at home at baseline  Post session: pt returned back to recliner, chair alarm engaged, all needs in reach and RN notified of session findings/recommendations      P:  Continue to recommend STR at time of d/c in order to maximize pt's functional independence and safety w/ mobility  Pt continues to be functioning below baseline level, and remains limited 2* factors listed above  PT will continue to see pt while here in order to address the deficits listed above and provide interventions consistent w/ POC in effort to achieve STGs      Edna Hahn, PT, DPT

## 2018-11-16 NOTE — DISCHARGE SUMMARY
Discharge- Owen Offer 7/21/1923, 80 y o  female MRN: [id number]    Unit/Bed#: -01 Encounter: [id number]    Primary Care Provider: Mia Randall DO   Date and time admitted to hospital: 11/12/2018  4:44 PM        Atrial fibrillation [facility])   Assessment & Plan    Rate controlled cardiology consult appreciated  No anticoagulation due to high fall risk vs cva  Family aware  No coumadin or plavix with gi bleed     Urinary retention   Assessment & Plan    Urology consult appreciated will maintain wesley for discharge  Son aware of need for wesley care and does not wish any additional follow up of urinary retention due to age [id number] is aware of L5 compression fracture and possible spinal etiology     TIA (transient ischemic attack)   Assessment & Plan    Off anticoagulation due to GI bleed-family aware of risk of further cva/tia in light of new onset afib     * GI bleed   Assessment & Plan    Case d/w GI-as not starting anticoagulation-will not do endoscopy-conservative rx recommended; case d/w son-does not want invasive procedure and aware of high risk of stroke without any anicoagulation now with gi bleed-will proceed without anticoagulation           Discharging Physician / Practitioner: Yazmin Kenney MD  PCP: Mia Randall DO  Admission Date:   Admission Orders     Ordered        11/12/18 2010  Inpatient Admission (expected length of stay for this patient is greater than two midnights)  Once         11/12/18 2013  Inpatient Admission  Once             Discharge Date: 11/16/18    Resolved Problems  Date Reviewed: 11/16/2018    None          Consultations During Hospital Stay:  · GI and cardiology, urology    Procedures Performed:     · Wesley catheter    Significant Findings / Test Results:     · GI bleed likely UGI with stable hemoglobin  · Atrial fibrillation-no anticoagulation per family with GI bleed risks and benefits discussed    Incidental Findings:   · Urinary retention    Test Results Pending at Discharge (will require follow up):   ·      Outpatient Tests Requested:  ·     Complications:      Reason for Admission:     Hospital Course:     Sergio Austin is a 80 y o  female patient who originally presented to the hospital on 11/12/2018 due to black stools  Please see above list of diagnoses and related plan for additional information  Condition at Discharge: stable     Discharge Day Visit / Exam:     * Please refer to separate progress note for these details *    Discussion with Family: son    Discharge instructions/Information to patient and family:   See after visit summary for information provided to patient and family  Provisions for Follow-Up Care:  See after visit summary for information related to follow-up care and any pertinent home health orders  Disposition:     Home with VNA Services (Reminder: Complete face to face encounter)    For Discharges to [facility] SN[facility]:   · Not Applicable to this Patient - Not Applicable to this Patient    Planned Readmission:      Discharge Statement:  I spent 40 minutes discharging the patient  This time was spent on the day of discharge  I had direct contact with the patient on the day of discharge  Greater than 50% of the total time was spent examining patient, answering all patient questions, arranging and discussing plan of care with patient as well as directly providing post-discharge instructions  Additional time then spent on discharge activities  Discharge Medications:  See after visit summary for reconciled discharge medications provided to patient and family        ** Please Note: This note has been constructed using a voice recognition system **

## 2018-11-16 NOTE — PLAN OF CARE
Problem: PHYSICAL THERAPY ADULT  Goal: Performs mobility at highest level of function for planned discharge setting  See evaluation for individualized goals  Treatment/Interventions: Functional transfer training, LE strengthening/ROM, Elevations, Therapeutic exercise, Endurance training, Patient/family training, Equipment eval/education, Bed mobility, Gait training, Spoke to nursing, Spoke to case management, Spoke to MD  Equipment Recommended:  (continue with RW)       See flowsheet documentation for full assessment, interventions and recommendations  Prognosis: Good  Problem List: Decreased strength, Decreased endurance, Impaired balance, Decreased mobility, Decreased safety awareness, Impaired hearing, Pain  Assessment: Pt is 80 y o  female seen for PT evaluation on 11/16/2018 s/p admit to [facility] on 11/12/2018 w/ GI bleed  Pt presents with melanotic stools and episode of hematemesis  PT consulted to assess pt's functional mobility and d/c needs  Order placed for PT eval and tx, w/ up w/ A order  Performed at least 2 patient identifiers during session: Name and wristband  Comorbidities affecting pt's physical performance at time of assessment include: anemia, cardiac disease, GERD, HTN, stroke, [place], dementia  PTA, pt was independent w/ all functional mobility w/ RW, ambulates household distances, has 3 JONY, lives alone (son is able to stay with patient overnight) in 2 level home (1st floor setup) and retired  Personal factors affecting pt at time of IE include: inaccessible home environment, ambulating w/ assistive device, stairs to enter home, inability to ambulate household distances, inability to navigate level surfaces w/o external assistance, unable to perform dynamic tasks in community, limited home support, positive fall history, hearing impairments, decreased initiation and engagement and limited insight into impairments   Please find objective findings from PT assessment regarding body systems outlined above with impairments and limitations including weakness, impaired balance, decreased endurance, gait deviations, pain, decreased activity tolerance, decreased safety awareness and fall risk, as well as mobility assessment (need for min-mod assist w/ all phases of mobility when usually ambulating independently and need for cueing for mobility technique)  The following objective measures performed on IE also reveal limitations: Barthel Index: 30/100 and Modified [place]: 4 (moderate/severe disability)  Pt's clinical presentation is currently unstable/unpredictable seen in pt's presentation of need for input for task focus and mobility technique and ongoing medical assessment  Pt to benefit from continued PT tx to address deficits as defined above and maximize level of functional independent mobility and consistency  From PT/mobility standpoint, recommendation at time of d/c would be STR pending progress in order to facilitate return to PLOF  Barriers to Discharge: Inaccessible home environment, Decreased caregiver support     Recommendation: Short-term skilled PT     PT - OK to Discharge: Yes (when medically cleared if to STR)    See flowsheet documentation for full assessment

## 2018-11-16 NOTE — PROGRESS NOTES
Progress Note - Tila Ramírez 1923, 80 y o  female MRN: [id number]    Unit/Bed#: -01 Encounter: [id number]    Primary Care Provider: Ander Reagan DO   Date and time admitted to hospital: 2018  4:44 PM        Urinary retention   Assessment & Plan    Urinary retention protocol  Continues to retain urology consult     Dyslipidemia   Assessment & Plan    Resume home med     Dementia without behavioral disturbance   Assessment & Plan    -fall precautions  -supportive care     Mass of parotid gland   Assessment & Plan    Has had for many years and stable     * GI bleed   Assessment & Plan    Case d/w GI-as not starting anticoagulation-will not do endoscopy-conservative rx recommended; case d/w son-does not want invasive procedure and aware of high risk of stroke without any anicoagulation now with gi bleed-will proceed without anticoagulation         VTE Pharmacologic Prophylaxis:   Pharmacologic: Pharmacologic VTE Prophylaxis contraindicated due to gi bleed  Mechanical VTE Prophylaxis in Place: Yes    Patient Centered Rounds: I have performed bedside rounds with nursing staff today  Discussions with Specialists or Other Care Team Provider:     Education and Discussions with Family / Patient:     Time Spent for Care: 30 minutes  More than 50% of total time spent on counseling and coordination of care as described above  Current Length of Stay: 4 day(s)    Current Patient Status: Inpatient   Certification Statement: The patient will continue to require additional inpatient hospital stay due to urinary retention    Discharge Plan: home    Code Status: Level 3 - DNAR and DNI      Subjective:   No c/o  Objective:     Vitals:   Temp (24hrs), Av 5 °F (36 9 °C), Min:98 1 °F (36 7 °C), Max:99 °F (37 2 °C)    Temp:  [98 1 °F (36 7 °C)-99 °F (37 2 °C)] 98 3 °F (36 8 °C)  HR:  [66-93] 66  Resp:  [18] 18  BP: (103-123)/(58-81) 103/58  SpO2:  [94 %-95 %] 95 %  Body mass index is 23 2 kg/m²  Input and Output Summary (last 24 hours): Intake/Output Summary (Last 24 hours) at 11/16/18 0941  Last data filed at 11/16/18 0040   Gross per 24 hour   Intake                0 ml   Output             1625 ml   Net            -1625 ml       Physical Exam:     Physical Exam   Constitutional: She appears well-developed and well-nourished  HENT:   Head: Normocephalic and atraumatic  Eyes: Pupils are equal, round, and reactive to light  Neck: Neck supple  Cardiovascular: Normal rate and regular rhythm  Pulmonary/Chest: Effort normal and breath sounds normal    Abdominal: Soft  Bowel sounds are normal    Musculoskeletal: She exhibits no edema  Neurological: She is alert  Additional Data:     Labs:      Results from last 7 days  Lab Units 11/14/18  0116  11/12/18  1741   WBC Thousand/uL  --   --  10 34*   HEMOGLOBIN g/dL 11 0*  < > 11 9   HEMATOCRIT %  --   --  35 0   PLATELETS Thousands/uL  --   --  241   NEUTROS PCT %  --   --  87*   LYMPHS PCT %  --   --  7*   MONOS PCT %  --   --  5   EOS PCT %  --   --  0   < > = values in this interval not displayed  Results from last 7 days  Lab Units 11/14/18  1642  11/12/18  1741   SODIUM mmol/L 133*  < > 136   POTASSIUM mmol/L 3 5  < > 3 1*   CHLORIDE mmol/L 97*  < > 97*   CO2 mmol/L 28  < > 29   BUN mg/dL 8  < > 13   CREATININE mg/dL 0 78  < > 0 82   ANION GAP mmol/L 8  < > 10   CALCIUM mg/dL 9 2  < > 9 4   ALBUMIN g/dL  --   --  3 6   TOTAL BILIRUBIN mg/dL  --   --  0 80   ALK PHOS U/L  --   --  95   ALT U/L  --   --  23   AST U/L  --   --  22   GLUCOSE RANDOM mg/dL 149*  < > 123   < > = values in this interval not displayed  Results from last 7 days  Lab Units 11/12/18  1846   INR  1 08                       * I Have Reviewed All Lab Data Listed Above  * Additional Pertinent Lab Tests Reviewed:  All Labs Within Last 24 Hours Reviewed    Imaging:    Imaging Reports Reviewed Today Include:   Imaging Personally Reviewed by Myself Includes: Recent Cultures (last 7 days):       Results from last 7 days  Lab Units 11/12/18  1741 11/12/18  1720   BLOOD CULTURE  No Growth at 72 hrs  No Growth at 72 hrs  Last 24 Hours Medication List:     Current Facility-Administered Medications:  acetaminophen 650 mg Oral Q6H PRN Joleen Bills MD   gabapentin 100 mg Oral Daily Joleen Bills MD   pantoprazole 40 mg Oral Early Morning Hilda Coles MD        Today, Patient Was Seen By: Hilda Coles MD    ** Please Note: Dictation voice to text software may have been used in the creation of this document   **

## 2018-11-16 NOTE — SOCIAL WORK
RONN contacted pt's son via telephone to discuss discharge plan  He agreed to allow CM to schedule HRR appt with Dr Santosh Okeefe CM scheduled appt for 11/20/18 at 1:20pm  AVS updated  He stated that he does not feel VNA is needed

## 2018-11-16 NOTE — SOCIAL WORK
RONN spoke with Tamara Justice via telephone again to inform that STR is recommended  He refused at this time but agreeable to VNA and Outpatient CM

## 2018-11-16 NOTE — ASSESSMENT & PLAN NOTE
Rate controlled cardiology consult appreciated  No anticoagulation due to high fall risk vs cva  Family aware  No coumadin or plavix with gi bleed

## 2018-11-16 NOTE — SOCIAL WORK
[facility] has accepted pt  No female bed available until tomorrow  Pt's son will be to pick pt up at around 3pm  All aware

## 2018-11-16 NOTE — ASSESSMENT & PLAN NOTE
Off anticoagulation due to GI bleed-family aware of risk of further cva/tia in light of new onset afib

## 2018-11-16 NOTE — SOCIAL WORK
CM met with pt at bedside  Pt alert  Pt reported that she lives at home alone during the day and her son comes to visit her at night  She stated that she lives in an one story home  She reported that she uses a walker  She said that she can perform ADLs independently with some assistance  She stated that she is able to bath on her own  She said that her son and daughter brings food home for her  She stated that her daughter cleans the house for her  She reported that she believes she has had [facility] in the past but unable to remember the agency  She stated that she fell and broke her arm and shoulder in the past and believes she went to [facility]  She reported that her son lays her pills out and she stated that she is able to take them  She said that she uses [employer]  She said that she does not know if she has a POA  She reported that her son provides transportation  CM name and role reviewed  Discharge Checklist reviewed and CM will continue to monitor for progress toward discharge goals in nursing and provider rounds  CM reviewed discharge planning process including the following: identifying help at home, patient preference for discharge planning needs, pharmacy preference, and availability of treatment team to discuss questions or concerns patient and/or family may have regarding understanding medications and recognizing signs and symptoms once discharged  CM also encouraged patient to follow up with all recommended appointments after discharge  Patient advised of importance for patient and family to participate in managing patients medical well being

## 2018-11-16 NOTE — CONSULTS
CONSULT    Patient Name: Afshin Dior  Patient MRN: [id number]  Date of Service: 11/16/2018   Date / Time Note Created: 11/16/2018 12:55 PM   Referring Provider:   Dr Jalyn Kitchen   Provider Creating Note: MANUEL Michaels    PCP: Jael Theodore  Attending Provider:  Dr Jalyn Kitchen    Reason for Consult: Urinary Retention    HPI --Ange Holland is a 59-year-old female without prior  history admitted for GI bleed  Patient denies any fever, chills, flank, groin, suprapubic pain or irritative or obstructive urinary symptoms at present or chronically  Nursing staff have performed intermittent catheterization multiple times for significant bladder volumes  Per protocol, indwelling Hahn catheter inserted  Patient is afebrile, normal creatinine  Urinalysis was only mildly suggestive of UTI initially and repeat UA is negative   tract imaging is unavailable  Patient is nearing end of hospital stay and discharge plan is in progress  Urologic consultation is requested for Hahn catheter management recommendations  Source:chart review and the patient         Patient Active Problem List   Diagnosis    TIA (transient ischemic attack)    Mass of parotid gland    Dementia without behavioral disturbance    Acute cystitis without hematuria    Dyslipidemia    Gastroesophageal reflux disease without esophagitis    Bilateral hearing loss    Lower leg edema    GI bleed    Urinary retention             Impressions   Urinary Retention--likely long-standing and high volume  Patient is insensate to bladder overdistention another indicator of chronic poor bladder emptying  Multifactorial--secondary to advanced age, prior CVA and acute episode of illness (i e  GI bleed)    Recommendations  Maintain catheter to straight drainage  Do not remove  Continue bladder decompression through discharge  Repeat void trial at home with home health nurses in 5 days    However, in cases of advanced age, would make attempts to refrain from Re catheterization unless patient has accompanying recurrent UTI, renal impairment or discomfort  Indwelling urinary catheterization is associated with medical frailty and poor quality of life in the very elderly  Instructions for VNA to follow inserted in after visit summary  No further  intervention indicated during this hospital stay  Office will contact patient's caregiver with non urgent hospital follow-up  Past Medical History:   Diagnosis Date    Anemia     Cardiac disease     GERD (gastroesophageal reflux disease)     Hypertension     Stroke [facility])        Past Surgical History:   Procedure Laterality Date    APPENDECTOMY      CHOLECYSTECTOMY      FRACTURE SURGERY      HYSTERECTOMY         History reviewed  No pertinent family history  Social History     Social History    Marital status:      Spouse name: N/A    Number of children: N/A    Years of education: N/A     Occupational History    Not on file       Social History Main Topics    Smoking status: Never Smoker    Smokeless tobacco: Never Used    Alcohol use No    Drug use: No    Sexual activity: No     Other Topics Concern    Not on file     Social History Narrative    No narrative on file       No Known Allergies    Review of Systems  10 point review of systems negative except as noted in HPI     Chart Review   Allergies, current medications, history, problem list    Vital Signs & Physical Exam  General appearance: alert and oriented, in no acute distress, appears stated age, cooperative and Frail and elderly  Head: Normocephalic, without obvious abnormality, atraumatic  Neck: Right Parotid mass  Lungs: diminished breath sounds  Heart: regular rate and rhythm, S1, S2 normal, no murmur, click, rub or gallop  Abdomen: soft, non-tender; bowel sounds normal; no masses,  no organomegaly  Extremities: extremities normal, warm and well-perfused; no cyanosis, clubbing, or edema  Pulses: 2+ and symmetric  Neurologic: Grossly normal  Hahn patent for clear yellow urine     Laboratory Studies  Lab Results   Component Value Date    HGBA1C 5 9 09/18/2018     06/20/2018    K 3 5 11/14/2018    K 3 9 06/20/2018    CL 97 (L) 11/14/2018    CL 97 (L) 06/20/2018    CO2 28 11/14/2018    CO2 32 09/17/2018    GLUCOSE 87 09/17/2018    CREATININE 0 78 11/14/2018    CREATININE 0 81 06/20/2018    BUN 8 11/14/2018    BUN 15 06/20/2018    MG 2 1 11/13/2018               Imaging and Other Studies  )Ct Abdomen Pelvis With Contrast    Result Date: 11/12/2018  Narrative: CT ABDOMEN AND PELVIS WITH IV CONTRAST INDICATION:   Epigastric pain, melena  COMPARISON:  None  TECHNIQUE:  CT examination of the abdomen and pelvis was performed  Axial, sagittal, and coronal 2D reformatted images were created from the source data and submitted for interpretation  Radiation dose length product (DLP) for this visit:  582 mGy-cm   This examination, like all CT scans performed in the [facility], was performed utilizing techniques to minimize radiation dose exposure, including the use of iterative reconstruction and automated exposure control  IV Contrast:  100 mL of iohexol (OMNIPAQUE) Enteric Contrast:  Enteric contrast was not administered  FINDINGS: ABDOMEN LOWER CHEST:  Cardiomegaly  Minimal subsegmental atelectasis at the lung bases  LIVER/BILIARY TREE:  Prominent common bile duct likely secondary to post surgical change from cholecystectomy  No evidence of choledocholithiasis  GALLBLADDER:  Cholecystectomy  SPLEEN:  Unremarkable  PANCREAS:  Unremarkable  ADRENAL GLANDS:  Unremarkable  KIDNEYS/URETERS:  No evidence of pyelonephritis or obstructive uropathy  STOMACH AND BOWEL:  Decompressed stomach, limiting evaluation  [place] diverticulum in the distal 2nd portion of the duodenum measuring 3 cm  No evidence of duodenitis  Mild diverticulosis throughout the colon without evidence of diverticulitis or colitis    No bowel obstruction  APPENDIX:  No findings to suggest appendicitis  ABDOMINOPELVIC CAVITY:  No ascites or free intraperitoneal air  No lymphadenopathy  VESSELS:  Rim calcified 1 7 cm lesion likely to represent a 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  No evidence of abdominal aortic aneurysm  PELVIS REPRODUCTIVE ORGANS:  Patient is status post hysterectomy  URINARY BLADDER:  Unremarkable  ABDOMINAL WALL/INGUINAL REGIONS:  Unremarkable  OSSEOUS STRUCTURES: Diffuse osteopenia  Chronic L5 compression fracture with 50% loss of height  No significant retropulsion  No acute fracture or destructive osseous lesion  Impression: 1  Decompressed stomach limiting evaluation  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  2   Diverticulosis without evidence of diverticulitis, colitis or bowel obstruction  3   Chronic L5 compression fracture  Workstation performed: [id number]         Medications   Scheduled Meds:  Current Facility-Administered Medications:  acetaminophen 650 mg Oral Q6H PRN Alexandra Dumont MD   gabapentin 100 mg Oral Daily Alexandra Dumont MD   pantoprazole 40 mg Oral Early Morning Cadence Baires MD     Continuous Infusions:   PRN Meds:   acetaminophen      Total time spent with patient 20 minutes, >50% spent counseling and/or coordination of care           )MANUEL Michaels

## 2018-11-17 VITALS
RESPIRATION RATE: 16 BRPM | WEIGHT: 114.86 LBS | SYSTOLIC BLOOD PRESSURE: 128 MMHG | HEART RATE: 89 BPM | BODY MASS INDEX: 23.16 KG/M2 | DIASTOLIC BLOOD PRESSURE: 64 MMHG | OXYGEN SATURATION: 95 % | HEIGHT: 59 IN | TEMPERATURE: 98.4 F

## 2018-11-17 LAB
BACTERIA BLD CULT: NORMAL
BACTERIA BLD CULT: NORMAL

## 2018-11-17 PROCEDURE — 97110 THERAPEUTIC EXERCISES: CPT

## 2018-11-17 PROCEDURE — 97116 GAIT TRAINING THERAPY: CPT

## 2018-11-17 PROCEDURE — 97530 THERAPEUTIC ACTIVITIES: CPT

## 2018-11-17 PROCEDURE — 99239 HOSP IP/OBS DSCHRG MGMT >30: CPT | Performed by: GENERAL PRACTICE

## 2018-11-17 RX ADMIN — PANTOPRAZOLE SODIUM 40 MG: 40 TABLET, DELAYED RELEASE ORAL at 05:04

## 2018-11-17 RX ADMIN — GABAPENTIN 100 MG: 100 CAPSULE ORAL at 10:39

## 2018-11-17 NOTE — PHYSICAL THERAPY NOTE
Physical Therapy Treatment Note       11/17/18 0928   Pain Assessment   Pain Assessment No/denies pain   Pain Score No Pain   Restrictions/Precautions   Weight Bearing Precautions Per Order No   Braces or Orthoses (none per pt)   Other Precautions Chair Alarm; Bed Alarm;Multiple lines; Fall Risk  (back safety precautions, [place])   General   Chart Reviewed Yes   Response to Previous Treatment Patient with no complaints from previous session  Family/Caregiver Present No   Cognition   Overall Cognitive Status Impaired   Arousal/Participation Alert; Cooperative   Attention Attends with cues to redirect   Orientation Level Oriented to person;Oriented to place; Disoriented to time;Disoriented to situation   Memory Decreased recall of recent events;Decreased recall of precautions   Following Commands Follows one step commands with increased time or repetition   Comments pt agreeable to PT session   Subjective   Subjective "I need to get OOB"   Bed Mobility   Supine to Sit 4  Minimal assistance   Additional items Assist x 1; Increased time required;Verbal cues;LE management;HOB elevated  (log roll technique)   Transfers   Sit to Stand 4  Minimal assistance   Additional items Assist x 1; Armrests; Increased time required;Verbal cues   Stand to Sit 4  Minimal assistance   Additional items Assist x 1; Armrests; Increased time required;Verbal cues   Additional Comments vc and tactile cues for posture in static standing during hygiene care by PCA  Pt requiring max vc for sequencing and posture during gait trials   Ambulation/Elevation   Gait pattern Decreased foot clearance; Forward Flexion; Short stride; Step to   Gait Assistance 4  Minimal assist   Additional items Assist x 1;Verbal cues; Tactile cues  (for RW management)   Assistive Device Rolling walker   Distance 5' + 8'   Stair Management Assistance Not tested   Balance   Static Sitting Fair +   Dynamic Sitting Fair   Static Standing Fair -  (2min duration during hygiene care with use of RW)   Dynamic Standing Poor +   Ambulatory Poor +   Endurance Deficit   Endurance Deficit Yes   Activity Tolerance   Activity Tolerance Patient limited by fatigue   Nurse Made Aware RN Cristine Elias verbalized pt appropriate to see, made aware of session outcome/recs   Exercises   Heelslides Sitting;10 reps;AROM; Bilateral   Hip Abduction Sitting;10 reps;AROM; Bilateral   Hip Adduction Sitting;10 reps;AROM; Bilateral   Knee AROM Long Arc Quad Sitting;10 reps;AROM; Bilateral   Ankle Pumps Sitting;10 reps;AROM; Bilateral   Marching Sitting;10 reps;AROM; Bilateral   Assessment   Prognosis Good   Problem List Decreased strength;Decreased endurance; Impaired balance;Decreased mobility; Decreased safety awareness; Impaired hearing;Pain   Assessment Pt seen for PT treatment session this date with interventions consisting of gait training w/ emphasis on improving pt's ability to ambulate level surfaces x 5'+8' with min A provided by therapist with RW, Therapeutic exercise consisting of: AROM 10 reps B LE in sitting position and therapeutic activity consisting of training: supine<>sit transfers, sit<>stand transfers, static standing tolerance for 2 minutes w/ B UE support and vc and tactile cues for static standing posture faciliation  Pt agreeable to PT treatment session upon arrival, pt found supine in bed w/ HOB elevated, in no apparent distress, A&O x 2 and responsive  In comparison to previous session, pt with improvements in decreased A for all phases of mobility, improved gait tolerance with use of RW, no overt LOB observed, continued seated ther ex in pain free range to tolerance  Post session: pt returned back to recliner, chair alarm engaged, all needs in reach and RN notified of session findings/recommendations  Continue to recommend STR at time of d/c in order to maximize pt's functional independence and safety w/ mobility  Pt continues to be functioning below baseline level, and remains limited 2* factors listed above   PT will continue to see pt while here in order to address the deficits listed above and provide interventions consistent w/ POC in effort to achieve STGs  Barriers to Discharge Inaccessible home environment;Decreased caregiver support   Goals   Patient Goals to get stronger and walk better   STG Expiration Date 11/26/18   Short Term Goal #1 STGs remain appropriate   Treatment Day 2   Plan   Treatment/Interventions Functional transfer training;LE strengthening/ROM; Elevations; Therapeutic exercise; Endurance training;Patient/family training;Equipment eval/education; Bed mobility;Gait training;Spoke to nursing;Spoke to case management   Progress Progressing toward goals   PT Frequency 5x/wk   Recommendation   Recommendation Short-term skilled PT   Equipment Recommended Walker  (RW)   PT - OK to Discharge Yes  (when medically cleared if to STR)       Eleni Bishop, PT, DPT    Time of PT treatment session: [phone]

## 2018-11-17 NOTE — NURSING NOTE
Patient discharged to [facility]  Son transported as per his request   All discharge instructions reviewed with Haylie at [facility]  Hahn in place and draining as per order

## 2018-11-17 NOTE — DISCHARGE SUMMARY
Atrial fibrillation [facility])   Assessment & Plan     Rate controlled cardiology consult appreciated  No anticoagulation due to high fall risk vs cva  Family aware  No coumadin or plavix with gi bleed      Urinary retention   Assessment & Plan     Urology consult appreciated will maintain wesley for discharge  Son aware of need for wesley care and does not wish any additional follow up of urinary retention due to age [id number] is aware of L5 compression fracture and possible spinal etiology      TIA (transient ischemic attack)   Assessment & Plan     Off anticoagulation due to GI bleed-family aware of risk of further cva/tia in light of new onset afib      * GI bleed   Assessment & Plan     Case d/w GI-as not starting anticoagulation-will not do endoscopy-conservative rx recommended; case d/w son-does not want invasive procedure and aware of high risk of stroke without any anicoagulation now with gi bleed-will proceed without anticoagulation               Discharging Physician / Practitioner: Derrick Hirsch MD  PCP: Deann Kumar DO  Admission Date:         Admission Orders      Ordered         11/12/18 2010   Inpatient Admission (expected length of stay for this patient is greater than two midnights)  Once          11/12/18 2013   Inpatient Admission  Once               Discharge Date: 11/17/18          Resolved Problems  Date Reviewed: 11/16/2018     None             Consultations During Hospital Stay:  · GI and cardiology, urology     Procedures Performed:      · Wesley catheter     Significant Findings / Test Results:      · GI bleed likely UGI with stable hemoglobin  · Atrial fibrillation-no anticoagulation per family with GI bleed risks and benefits discussed     Incidental Findings:   · Urinary retention     Test Results Pending at Discharge (will require follow up):   ·       Outpatient Tests Requested:  ·         PE  Heart regular  Lungs cta  Ext no edema  GI no abdominal pain    Complications:       Reason for Admission:      Hospital Course:      Link Roque is a 80 y o  female patient who originally presented to the hospital on 11/12/2018 due to black stools      Please see above list of diagnoses and related plan for additional information       Condition at Discharge: stable      Discharge Day Visit / Exam:      * Please refer to separate progress note for these details *     Discussion with Family: son     Discharge instructions/Information to patient and family:   See after visit summary for information provided to patient and family        Provisions for Follow-Up Care:  See after visit summary for information related to follow-up care and any pertinent home health orders        Disposition:      Home with VNA Services (Reminder: Complete face to face encounter)     For Discharges to [facility] SNF:   · Not Applicable to this Patient - Not Applicable to this Patient     Planned Readmission:      Discharge Statement:  I spent 40 minutes discharging the patient  This time was spent on the day of discharge  I had direct contact with the patient on the day of discharge  Greater than 50% of the total time was spent examining patient, answering all patient questions, arranging and discussing plan of care with patient as well as directly providing post-discharge instructions    Additional time then spent on discharge activities      Discharge Medications:  See after visit summary for reconciled discharge medications provided to patient and family        ** Please Note: This note has been constructed using a voice recognition system **

## 2018-11-17 NOTE — SOCIAL WORK
CM met with pt at bedside  IMM reviewed and signed by pt  Copy to pt and copy to MR for scanning  Pt to be discharged to [address] [place] [address] today with son transporting at 15:00

## 2018-11-17 NOTE — PLAN OF CARE
Problem: PHYSICAL THERAPY ADULT  Goal: Performs mobility at highest level of function for planned discharge setting  See evaluation for individualized goals  Treatment/Interventions: Functional transfer training, LE strengthening/ROM, Elevations, Therapeutic exercise, Endurance training, Patient/family training, Equipment eval/education, Bed mobility, Gait training, Spoke to nursing, Spoke to case management, Spoke to MD  Equipment Recommended:  (continue with RW)       See flowsheet documentation for full assessment, interventions and recommendations  Outcome: Progressing  Prognosis: Good  Problem List: Decreased strength, Decreased endurance, Impaired balance, Decreased mobility, Decreased safety awareness, Impaired hearing, Pain  Assessment: Pt seen for PT treatment session this date with interventions consisting of gait training w/ emphasis on improving pt's ability to ambulate level surfaces x 5'+8' with min A provided by therapist with RW, Therapeutic exercise consisting of: AROM 10 reps B LE in sitting position and therapeutic activity consisting of training: supine<>sit transfers, sit<>stand transfers, static standing tolerance for 2 minutes w/ B UE support and vc and tactile cues for static standing posture faciliation  Pt agreeable to PT treatment session upon arrival, pt found supine in bed w/ HOB elevated, in no apparent distress, A&O x 2 and responsive  In comparison to previous session, pt with improvements in decreased A for all phases of mobility, improved gait tolerance with use of RW, no overt LOB observed, continued seated ther ex in pain free range to tolerance  Post session: pt returned back to recliner, chair alarm engaged, all needs in reach and RN notified of session findings/recommendations  Continue to recommend STR at time of d/c in order to maximize pt's functional independence and safety w/ mobility   Pt continues to be functioning below baseline level, and remains limited 2* factors listed above  PT will continue to see pt while here in order to address the deficits listed above and provide interventions consistent w/ POC in effort to achieve STGs  Barriers to Discharge: Inaccessible home environment, Decreased caregiver support     Recommendation: Short-term skilled PT     PT - OK to Discharge: Yes (when medically cleared if to STR)    See flowsheet documentation for full assessment

## 2018-11-17 NOTE — PLAN OF CARE
Problem: DISCHARGE PLANNING  Goal: Discharge to home or other facility with appropriate resources  INTERVENTIONS:  - Identify barriers to discharge w/patient and caregiver  - Arrange for needed discharge resources and transportation as appropriate  - Identify discharge learning needs (meds, wound care, etc )  - Arrange for interpretive services to assist at discharge as needed  - Refer to Case Management Department for coordinating discharge planning if the patient needs post-hospital services based on physician/advanced practitioner order or complex needs related to functional status, cognitive ability, or social support system   Outcome: Completed Date Met: 11/17/18  CM met with pt at bedside  IMM reviewed and signed by pt  Copy to pt and copy to MR for scanning  Pt to be discharged to [facility] today with son transporting at 15:00

## 2018-11-20 ENCOUNTER — PATIENT OUTREACH (OUTPATIENT)
Dept: FAMILY MEDICINE CLINIC | Facility: CLINIC | Age: 83
End: 2018-11-20

## 2018-11-20 ENCOUNTER — TELEPHONE (OUTPATIENT)
Dept: UROLOGY | Facility: MEDICAL CENTER | Age: 83
End: 2018-11-20

## 2018-11-20 NOTE — TELEPHONE ENCOUNTER
New patient - post ER visit  Reason for appointment/Complaint/Diagnosis : Needs catheter removed per skilled nursing facility    Insurance: Medicare     History of Cancer? no                       If yes, what kind? N/A    Previous urologist?     No                  Records requested/where? In EPIC    Outside testing/where? N/A    Location Preference for office visit?  [place]

## 2018-11-20 NOTE — PROGRESS NOTES
Patient referred by [facility]  Patient discharged and admitted to [facility] f/u once discharged  Did attempt to contact RP to introduce to care coordination  Voicemail left

## 2018-11-20 NOTE — TELEPHONE ENCOUNTER
As per Vanna's documentation:    Repeat void trial at home with home health nurses in 5 days  However, in cases of advanced age, would make attempts to refrain from Re catheterization unless patient has accompanying recurrent UTI, renal impairment or discomfort  Indwelling urinary catheterization is associated with medical frailty and poor quality of life in the very elderly  Instructions for VNA to follow inserted in after visit summary        If unable to arrange home health void trial, then f/u 2-3 weeks RN void trial

## 2018-11-20 NOTE — TELEPHONE ENCOUNTER
Patient was consulted in the hospital by Anuel Carter on 11/16/18 for urinary retention  When should patient but set up for outpatient void trial and what would her follow up be if she passes and if she fails

## 2018-11-21 NOTE — TELEPHONE ENCOUNTER
Called and scheduled patient for PVR on nurse scheduled on 12/14/18 at 2:30am and faxed over orders for wesley removal for 12/14 at 8:00am

## 2018-12-11 ENCOUNTER — TELEPHONE (OUTPATIENT)
Dept: FAMILY MEDICINE CLINIC | Facility: CLINIC | Age: 83
End: 2018-12-11

## 2018-12-11 NOTE — TELEPHONE ENCOUNTER
pc from [facility] pt pasted away last night  Will bring the Death cert today for Dr to sign if is OK w DR López signed Death  Alaina Soriano